# Patient Record
Sex: FEMALE | Race: WHITE | Employment: FULL TIME | URBAN - METROPOLITAN AREA
[De-identification: names, ages, dates, MRNs, and addresses within clinical notes are randomized per-mention and may not be internally consistent; named-entity substitution may affect disease eponyms.]

---

## 2019-01-02 ENCOUNTER — HOSPITAL ENCOUNTER (OUTPATIENT)
Dept: MRI IMAGING | Age: 49
Discharge: HOME | End: 2019-01-04
Payer: COMMERCIAL

## 2019-01-02 DIAGNOSIS — M50.10 CERVICAL DISC DISORDER WITH RADICULOPATHY: ICD-10-CM

## 2019-01-02 PROCEDURE — 72141 MRI NECK SPINE W/O DYE: CPT

## 2021-05-17 ENCOUNTER — HOSPITAL ENCOUNTER (OUTPATIENT)
Age: 51
Discharge: HOME | End: 2021-05-19

## 2021-05-17 PROCEDURE — 88360 TUMOR IMMUNOHISTOCHEM/MANUAL: CPT

## 2021-05-17 PROCEDURE — 88305 TISSUE EXAM BY PATHOLOGIST: CPT

## 2021-05-24 DIAGNOSIS — C50.911 INVASIVE DUCTAL CARCINOMA OF RIGHT BREAST (HCC): Primary | ICD-10-CM

## 2021-05-28 ENCOUNTER — TELEPHONE (OUTPATIENT)
Dept: SURGERY | Age: 51
End: 2021-05-28

## 2021-05-28 ENCOUNTER — TELEPHONE (OUTPATIENT)
Dept: BREAST CENTER | Age: 51
End: 2021-05-28

## 2021-05-28 NOTE — TELEPHONE ENCOUNTER
MA received a call from pt, pt has a conflicting oncology appointment on 6/4/21 and wishes to move appointment with Dr. Dianna Mccabe, pt can be reached at [phone]. MA routing to [employer] for advisement, imaging prior to appt.   Electronically signed by Juan Molina on 5/28/21 at 11:37 AM EDT

## 2021-05-28 NOTE — TELEPHONE ENCOUNTER
Patient called wishing to change the time of her appointment. She is seeing Dr Fuad Navarro at 1000. Patient will arrive here at 0730 for CXR followed by appointment at 0800. Patient states she will bring her genetic testing results (negative) to be scanned into chart. Patient states this is her second bout of breast cancer.

## 2021-06-04 ENCOUNTER — TELEPHONE (OUTPATIENT)
Dept: BREAST CENTER | Age: 51
End: 2021-06-04

## 2021-06-04 ENCOUNTER — HOSPITAL ENCOUNTER (OUTPATIENT)
Dept: GENERAL RADIOLOGY | Age: 51
Discharge: HOME | End: 2021-06-06
Payer: COMMERCIAL

## 2021-06-04 ENCOUNTER — TELEPHONE (OUTPATIENT)
Dept: CASE MANAGEMENT | Age: 51
End: 2021-06-04

## 2021-06-04 ENCOUNTER — OFFICE VISIT (OUTPATIENT)
Dept: BREAST CENTER | Age: 51
End: 2021-06-04
Payer: COMMERCIAL

## 2021-06-04 VITALS
TEMPERATURE: 99.5 F | DIASTOLIC BLOOD PRESSURE: 74 MMHG | HEIGHT: 58 IN | SYSTOLIC BLOOD PRESSURE: 116 MMHG | RESPIRATION RATE: 12 BRPM | HEART RATE: 90 BPM | OXYGEN SATURATION: 99 % | BODY MASS INDEX: 31.49 KG/M2 | WEIGHT: 150 LBS

## 2021-06-04 DIAGNOSIS — C50.911 INVASIVE DUCTAL CARCINOMA OF RIGHT BREAST (HCC): ICD-10-CM

## 2021-06-04 DIAGNOSIS — C50.911 RECURRENT BREAST CANCER, RIGHT (HCC): ICD-10-CM

## 2021-06-04 DIAGNOSIS — C50.111 MALIGNANT NEOPLASM OF CENTRAL PORTION OF RIGHT BREAST IN FEMALE, ESTROGEN RECEPTOR NEGATIVE (HCC): Primary | ICD-10-CM

## 2021-06-04 DIAGNOSIS — Z17.1 MALIGNANT NEOPLASM OF CENTRAL PORTION OF RIGHT BREAST IN FEMALE, ESTROGEN RECEPTOR NEGATIVE (HCC): Primary | ICD-10-CM

## 2021-06-04 LAB
ALBUMIN SERPL-MCNC: 4.8 G/DL (ref 3.5–5.2)
ALP BLD-CCNC: 99 U/L (ref 35–104)
ALT SERPL-CCNC: 35 U/L (ref 0–32)
ANION GAP SERPL CALCULATED.3IONS-SCNC: 15 MMOL/L (ref 7–16)
AST SERPL-CCNC: 32 U/L (ref 0–31)
BILIRUB SERPL-MCNC: <0.2 MG/DL (ref 0–1.2)
BUN BLDV-MCNC: 10 MG/DL (ref 6–20)
CALCIUM SERPL-MCNC: 9.9 MG/DL (ref 8.6–10.2)
CHLORIDE BLD-SCNC: 101 MMOL/L (ref 98–107)
CO2: 23 MMOL/L (ref 22–29)
CREAT SERPL-MCNC: 0.6 MG/DL (ref 0.5–1)
GFR AFRICAN AMERICAN: >60
GFR NON-AFRICAN AMERICAN: >60 ML/MIN/1.73
GLUCOSE BLD-MCNC: 96 MG/DL (ref 74–99)
HCT VFR BLD CALC: 43.5 % (ref 34–48)
HEMOGLOBIN: 15.1 G/DL (ref 11.5–15.5)
MCH RBC QN AUTO: 34.3 PG (ref 26–35)
MCHC RBC AUTO-ENTMCNC: 34.7 % (ref 32–34.5)
MCV RBC AUTO: 98.9 FL (ref 80–99.9)
PDW BLD-RTO: 12 FL (ref 11.5–15)
PLATELET # BLD: 211 E9/L (ref 130–450)
PMV BLD AUTO: 9.1 FL (ref 7–12)
POTASSIUM SERPL-SCNC: 4 MMOL/L (ref 3.5–5)
RBC # BLD: 4.4 E12/L (ref 3.5–5.5)
SODIUM BLD-SCNC: 139 MMOL/L (ref 132–146)
TOTAL PROTEIN: 7.8 G/DL (ref 6.4–8.3)
WBC # BLD: 7.7 E9/L (ref 4.5–11.5)

## 2021-06-04 PROCEDURE — 71046 X-RAY EXAM CHEST 2 VIEWS: CPT

## 2021-06-04 PROCEDURE — 99245 OFF/OP CONSLTJ NEW/EST HI 55: CPT | Performed by: SURGERY

## 2021-06-04 PROCEDURE — 99203 OFFICE O/P NEW LOW 30 MIN: CPT | Performed by: SURGERY

## 2021-06-04 ASSESSMENT — ENCOUNTER SYMPTOMS
GASTROINTESTINAL NEGATIVE: 1
VOMITING: 0
ABDOMINAL PAIN: 0
BLOOD IN STOOL: 0
BACK PAIN: 0
DIARRHEA: 0
ANAL BLEEDING: 0
COUGH: 0
RESPIRATORY NEGATIVE: 1
ALLERGIC/IMMUNOLOGIC NEGATIVE: 1
CONSTIPATION: 0
NAUSEA: 0
EYES NEGATIVE: 1
SHORTNESS OF BREATH: 0
ABDOMINAL DISTENTION: 0

## 2021-06-04 NOTE — TELEPHONE ENCOUNTER
Met with patient regarding her recent breast cancer diagnosis at surgical consultation appointment with Dr. Cierra Cardenas. Instructed on her breast biopsy pathology findings including cancer type and hormone receptor status. Hard copy given. Instructed on next steps including breast surgery options as per Dr. Mikayla Little recommendations. Provided with extensive literature including \"Be A Survivor: Your guide to breast cancer treatment\". ,chapter 4 reviewed, Your Guide to Your Breast Cancer Pathology Report, What does lymphedema feel like, local counseling agencies and local support groups. Today patient received copies of their pathology report as well as a list of local medical oncology providers. Provided patient with my contact information and encouraged to call me with questions or concerns. Patient verbalizes understanding and appreciative of nurse navigator visit.

## 2021-06-04 NOTE — TELEPHONE ENCOUNTER
Upon educating the patient, she meets criteria for testing of BRCA related mutations implicated in breast and ovarian cancer. This is by virtue of her having a diagnosis of breast cancer combined with either one of the following criteria as described by NCCN guidelines:  Personal history of breast cancer at the age of 30. Now has breast cancer age 52 on the same side. Mom had lung cancer age 62          Pre-Test Education and Risk Assessment During the patient's first visit, the patient has completed the \"Family History Questionnaire\" along with personal information pertinent to assessing risk factors. This information is used to complete the genetic assessment. Informed Consent Procedures Education along with the information guide \"Hereditary Breast and Ovarian Cancer Syndrome, A Patient's Guide to risk assessment\" is provided to the patient with additional resources listed with the information guide. Informed consent is obtained for all genetic testing, and the limitations and benefits of testing are discussed. The specific type of test to be completed, the cost of the tests, possible test results, and the implications of these results are reviewed with the individual. Written consent is obtained prior to testing. Testing  Confidentiality Standards Privacy is maintained in accordance with institutional guidelines. No patient files are coded. Information obtained is kept in a secure medical record. Any information regarding genetic testing cannot be released without the written consent of the individual.   Testing Genetic testing is coordinated and sent to in-house and outside institutions that are CAP/CLIA approved. Available Testing  Cancer/Syndrome Gene  Breast & Ovarian Cancer BRCA1, BRCA2       Blood specimen obtained with today's visit. Educational brochure given to patient to take home.     After considering the risks, benefits, and limitations, the patient chose to pursue and provided informed consent for the following testing:   Integrated BRACAnalysis with 5by Hereditary Cancer Update Test.    Genetic assessment and education in collaboration with Renata Cano MD FACS

## 2021-06-04 NOTE — PROGRESS NOTES
[facility]  HISTORY & PHYSICAL  ATTENDING NOTE    Patient's Name/Date of Birth: Marjan Aver 1970    Date: 2021     Chief Complaint   Patient presents with   [facility] Consultation     NEW BREAST CANCER:  Right breast - Invasive ductal carcinoma ER(-) IA(-) Her2(+) -- imaging/biopsy [facility] - Referring Dr Olga Alcantar right sided, AGE 30, BRCA NEGATIVE- WILL BRING- SEEING  [address] [place] [address] AT 1000    Immunizations     Pt has recieved both COVID vaccines        Goshen Meaghan Rae presents for evaluation of a mammographic abnormality. PCP: Benjy Joseph MD. Gynecologist: none. Referred by:  Dr. John Alston    The mammogram was performed at [facility] on May 11, 2021. The patient has noted a change in BSE since presentation. Patient denies nipple discharge. Patient admits to a personal history of breast cancer. Breast cancer risk factors include previous breast CA and gender. Ashkenazi Protestant Ancestry: No.    Felt a dimple about a month ago. Celio Smith has a history of right breast cancer at age 30. She had genetic testing at that time, but it appears they only tested for BRCA and no other genes. She had an ER/IA+ HER2- breast cancer at that time. Her Oncotype Dx was 2. She was treated with lumpectomy with SLNBx. Actually had an excisional biopsy first by Dr. Rosy Pelaez at [facility] which removed the tumor, then went to [facility] and Dr. Marcellina Goldmann did a reexcision with margins and scar removal and SLNBx (x2) and left breast biopsy. She was then on tamoxifen x 5 years. She had radiation. OBSTETRIC RELATED HISTORY:  Age of menarche was 17. Age of menopause was 42. Patient denies hormonal therapy. Patient is . Age of first live birth was 23. Patient did breast feed. Is patient interested in fertility information about fertility preservation?  No    CANCER SURVEILLANCE HISTORY:  Mammograms: Yes been a few years since last  Breast MRI's: Yes at time of breast cancer 2008  Breast Biopsies: Yes   Colonoscopy: No   GI Polyps: Not Applicable   EGD: Yes   Pelvic Exam: Yes at time of oophorectomy  Pap Smear: No   Dermatology: No   Lung screening: no  H/O DVT:  no  H/O Radiation:  yes - 2008-- 8 weeks, 40 visit        Estimated body mass index is 31.35 kg/m² as calculated from the following:    Height as of this encounter: 4' 10\" (1.473 m). Weight as of this encounter: 150 lb (68 kg). Bra Size: 36B    Because violence is so common, we ask all our patients: are you in a relationship or do you live with a person who threatens, hurts, or controls you:  no    Patient drinks little caffeinated beverages. Patient does smoke cigarettes. Patient does not use recreational drugs. Past Medical History:   Diagnosis Date    Cancer [facility]) 2008    right sided        Past Surgical History:   Procedure Laterality Date    BREAST LUMPECTOMY Right     CARPAL TUNNEL RELEASE Right     PARTIAL HYSTERECTOMY      TONSILLECTOMY AND ADENOIDECTOMY         No current outpatient medications on file. No current facility-administered medications for this visit.        Family History   Problem Relation Age of Onset   [place] Rowland Cancer Mother         lung     Ashkenazi Mosque Ancestry: No    Allergies   Allergen Reactions    Meperidine     Morphine     Sulfa Antibiotics Nausea And Vomiting and Other (See Comments)     fever       Social History     Socioeconomic History    Marital status:      Spouse name: Not on file    Number of children: Not on file    Years of education: Not on file    Highest education level: Not on file   Occupational History    Not on file   Tobacco Use    Smoking status: Current Every Day Smoker    Smokeless tobacco: Never Used   Vaping Use    Vaping Use: Never used   Substance and Sexual Activity    Alcohol use: Yes     Comment: occasionaly    Drug use: Never    Sexual activity: Not on file   Other Topics Concern    Not on file   Social History Narrative    Not on file     Social Determinants of Health     Financial Resource Strain:     Difficulty of Paying Living Expenses:    Food Insecurity:     Worried About Running Out of Food in the Last Year:     [address] in the Last Year:    Transportation Needs:     Lack of Transportation (Medical):  Lack of Transportation (Non-Medical):    Physical Activity:     Days of Exercise per Week:     Minutes of Exercise per Session:    Stress:     Feeling of Stress :    Social Connections:     Frequency of Communication with Friends and Family:     Frequency of Social Gatherings with Friends and Family:     Attends Lutheran Services:     Active Member of Clubs or Organizations:     Attends Club or Organization Meetings:     Marital Status:    Intimate Partner Violence:     Fear of Current or Ex-Partner:     Emotionally Abused:     Physically Abused:     Sexually Abused:        Occupation:     Review of Systems   Constitutional: Negative. Negative for activity change, appetite change and unexpected weight change. HENT: Negative. Eyes: Negative. Respiratory: Negative. Negative for cough and shortness of breath. Cardiovascular: Negative. Negative for chest pain and leg swelling. Gastrointestinal: Negative. Negative for abdominal distention, abdominal pain, anal bleeding, blood in stool, constipation, diarrhea, nausea and vomiting. Endocrine: Negative. Genitourinary: Negative. Musculoskeletal: Negative. Negative for arthralgias, back pain, gait problem, joint swelling and myalgias. Skin: Negative. Allergic/Immunologic: Negative. Neurological: Negative. Negative for dizziness, weakness and headaches. Hematological: Negative. Psychiatric/Behavioral: Negative. Negative for confusion, decreased concentration and sleep disturbance. ECOG PS:  0  Covid vaccination? Yes Pfizer  Flu Vaccination?  No    /74 (Site: Left Upper Arm, Position: Sitting, Cuff Size: Medium Adult)   Pulse 90   Temp 99.5 °F (37.5 °C) (Temporal)   Resp 12   Ht 4' 10\" (1.473 m)   Wt 150 lb (68 kg)   SpO2 99%   BMI 31.35 kg/m²   Physical Exam  Constitutional:       Appearance: Normal appearance. HENT:      Head: Normocephalic and atraumatic. Nose: Nose normal.      Mouth/Throat:      Mouth: Mucous membranes are moist.      Pharynx: Oropharynx is clear. Eyes:      Extraocular Movements: Extraocular movements intact. Pupils: Pupils are equal, round, and reactive to light. Cardiovascular:      Rate and Rhythm: Normal rate and regular rhythm. Pulses: Normal pulses. Heart sounds: Normal heart sounds. Pulmonary:      Effort: Pulmonary effort is normal.      Breath sounds: Normal breath sounds. Chest:      Breasts:         Right: No swelling, bleeding, inverted nipple, mass, nipple discharge, skin change or tenderness. Abdominal:      General: There is no distension. Palpations: Abdomen is soft. Tenderness: There is no abdominal tenderness. Musculoskeletal:         General: No tenderness or signs of injury. Cervical back: Normal range of motion and neck supple. Lymphadenopathy:      Upper Body:      Right upper body: No supraclavicular or axillary adenopathy. Left upper body: No supraclavicular or axillary adenopathy. Skin:     General: Skin is warm and dry. Neurological:      General: No focal deficit present. Mental Status: She is alert and oriented to person, place, and time. Psychiatric:         Mood and Affect: Mood normal.         Behavior: Behavior normal.         Thought Content: Thought content normal.         Judgment: Judgment normal.         MAMMOGRAM:        ULTRASOUND:      PATHOLOGY:    Diagnosis:   Right breast, \"retro-\", core biopsy: Invasive carcinoma, no special type   (ductal), see comment.      Comment:   Procedure- core biopsy   Specimen Laterality-right   Tumor Site-retro-   Histologic Type-invasive carcinoma, no special type (ductal)   Histologic Grade ([place] Histologic Score):    Glandular differentiation- score 3    Nuclear pleomorphism- score 3    Mitotic Rate- score 1                      Score-7    Overall Grade-2   DCIS-not identified   Lymphovascular invasion-not identified   Additional Note: The previous ipsilateral invasive and intraductal   carcinoma is noted ([id number]).  Intradepartmental consultation is   obtained. Breast Cancer Marker Studies:     Estrogen Receptors (ER):   -Negative (less than 1%): Internal control cells present and stain as expected: yes     Progesterone Receptors (CO):   -Negative (less than 1%): Internal control cells present and stain as expected: yes     Hormone receptor studies are performed by immunohistochemistry on   formalin-fixed, paraffin-embedded tissue (Roche Benchmark Immunostainer,   [place] anti-ER clone SP1, anti-CO clone [id number], polymer-based detection   chemistry). ER and CO are evaluated based on the percentage of cells   showing nuclear staining with >1% considered positive for each. Her-2/faustina (c-erb B-2) protein expression: Positive, 3+     ASSESSMENT/PLAN:  Right breast cancer--clinical prognostic Stage IA, ER/CO - HER2+  --MRI breast due to breast density and HER2+ cancer needing to assess size for NAC vs. Adjuvant therapy. --CT C/A/P, bone scan for recurrent breast cancer  --CBC, CMP, genetic testing  --Patient would like to have a bilateral mastectomy with SLNBx. I went over the r/b/a to procedure. I discussed bilateral pec blocks for the procedure. We discussed use of drains.   We discussed possibility of ALND given the prior SLNBx, however will definitely try SLNBx first.      I spent 85 minutes personally with the patient/family/staff/resident(s) in examination, chart and imaging review, discussing natural history and prognosis, differential diagnosis, risks and benefits of treatment and instructions of which more than 50% of the time was spent for counseling and coordinating care.       Guerline Urban MD, MSc, FACS  6/4/2021  8:22 AM

## 2021-06-10 ENCOUNTER — TELEPHONE (OUTPATIENT)
Dept: BREAST CENTER | Age: 51
End: 2021-06-10

## 2021-06-10 NOTE — TELEPHONE ENCOUNTER
LEANN Alaniz called and spoke to Sheree Cowart and scheduled PT for NM Whole Body and CT C/A/P on 06/23/2021 @ [facility]. NM injection @ 8am CT C/A/P @ 9am & 9:30am, and NM scan @ 11am PT verbalized she understood prep instructions, and NPO after midnight. PT verbalized that she understood appointment date/time, as well as to arrive 15 minutes prior to procedure. PT advised on where to park and enter the hospital at for procedure. MA instructed PT to call the office at [phone] with any questions, comments, or concerns about the procedure. MA faxed bilateral Breast MRI order to [facility] for them to contact patient and schedule. MA advised once they contact her for pt to call office to see if im able to have Dr Cierra Cardenas come to [facility] after MRI and perform skin bx on spot on back. Pt verbalized understanding.        Electronically signed by Cherry Cooper MA on 6/10/21 at 9:37 AM EDT

## 2021-06-10 NOTE — TELEPHONE ENCOUNTER
LEANN Donald contacted [facility] for prior authorization of outpatient imaging. No prior authorization needed for  CT C/A/P, Breast MRI, NM whole body [address]  in [place]. MA used automated system to check if authorization was required. Reference number [id number]. MA scanned authorization form in media tab.     Electronically signed by Rehana Kruse MA on 6/10/21 at 9:25 AM EDT

## 2021-06-15 ENCOUNTER — HOSPITAL ENCOUNTER (OUTPATIENT)
Dept: MRI IMAGING | Age: 51
Discharge: HOME | End: 2021-06-17
Payer: COMMERCIAL

## 2021-06-15 DIAGNOSIS — Z17.1 MALIGNANT NEOPLASM OF CENTRAL PORTION OF RIGHT BREAST IN FEMALE, ESTROGEN RECEPTOR NEGATIVE (HCC): ICD-10-CM

## 2021-06-15 DIAGNOSIS — C50.111 MALIGNANT NEOPLASM OF CENTRAL PORTION OF RIGHT BREAST IN FEMALE, ESTROGEN RECEPTOR NEGATIVE (HCC): ICD-10-CM

## 2021-06-15 DIAGNOSIS — C50.911 RECURRENT BREAST CANCER, RIGHT (HCC): ICD-10-CM

## 2021-06-15 PROCEDURE — 6360000004 HC RX CONTRAST MEDICATION: Performed by: RADIOLOGY

## 2021-06-15 PROCEDURE — A9585 GADOBUTROL INJECTION: HCPCS | Performed by: RADIOLOGY

## 2021-06-15 PROCEDURE — 77049 MRI BREAST C-+ W/CAD BI: CPT

## 2021-06-15 RX ADMIN — GADOBUTROL 7 ML: 604.72 INJECTION INTRAVENOUS at 08:52

## 2021-06-21 ENCOUNTER — TELEPHONE (OUTPATIENT)
Dept: BREAST CENTER | Age: 51
End: 2021-06-21

## 2021-06-21 DIAGNOSIS — Z17.1 MALIGNANT NEOPLASM OF CENTRAL PORTION OF RIGHT BREAST IN FEMALE, ESTROGEN RECEPTOR NEGATIVE (HCC): Primary | ICD-10-CM

## 2021-06-21 DIAGNOSIS — C50.911 RECURRENT BREAST CANCER, RIGHT (HCC): ICD-10-CM

## 2021-06-21 DIAGNOSIS — C50.111 MALIGNANT NEOPLASM OF CENTRAL PORTION OF RIGHT BREAST IN FEMALE, ESTROGEN RECEPTOR NEGATIVE (HCC): Primary | ICD-10-CM

## 2021-06-21 NOTE — TELEPHONE ENCOUNTER
Referral placed. Will call patient after her CT scans on 6/23.   I will call her later this week with all her results

## 2021-06-21 NOTE — TELEPHONE ENCOUNTER
Patient notified that referral has been placed to plastic surgery. She states she also has an appointment with Dr. Natasha Joe, medical oncology, 6/25/21 (this Friday).

## 2021-06-21 NOTE — TELEPHONE ENCOUNTER
Patient asking to be referred to Dr. Ankush Varner to discuss reconstruction options before making a final decision for surgery. Will check with Dr. Banda Courser on placing a referral.    I spoke with Jonna Page in reference to her [facility] genetic analysis. The results are negative. No clinically significant mutation identified. I explained the meaning of the results and informed her that I will notify Dr. Jonathan Acuña and a copy will go to Medical and Radiation Oncologist when and if appropriate. I will mail her copy along with the [facility] Understanding your results booklet.

## 2021-06-23 ENCOUNTER — HOSPITAL ENCOUNTER (OUTPATIENT)
Dept: CT IMAGING | Age: 51
Discharge: HOME | End: 2021-06-25
Payer: COMMERCIAL

## 2021-06-23 ENCOUNTER — HOSPITAL ENCOUNTER (OUTPATIENT)
Dept: NUCLEAR MEDICINE | Age: 51
Discharge: HOME | End: 2021-06-25
Payer: COMMERCIAL

## 2021-06-23 ENCOUNTER — TELEPHONE (OUTPATIENT)
Dept: SURGERY | Age: 51
End: 2021-06-23

## 2021-06-23 DIAGNOSIS — Z17.1 MALIGNANT NEOPLASM OF CENTRAL PORTION OF RIGHT BREAST IN FEMALE, ESTROGEN RECEPTOR NEGATIVE (HCC): ICD-10-CM

## 2021-06-23 DIAGNOSIS — C50.911 RECURRENT BREAST CANCER, RIGHT (HCC): ICD-10-CM

## 2021-06-23 DIAGNOSIS — C50.111 MALIGNANT NEOPLASM OF CENTRAL PORTION OF RIGHT BREAST IN FEMALE, ESTROGEN RECEPTOR NEGATIVE (HCC): ICD-10-CM

## 2021-06-23 PROCEDURE — A9503 TC99M MEDRONATE: HCPCS | Performed by: RADIOLOGY

## 2021-06-23 PROCEDURE — 78306 BONE IMAGING WHOLE BODY: CPT | Performed by: RADIOLOGY

## 2021-06-23 PROCEDURE — 74177 CT ABD & PELVIS W/CONTRAST: CPT

## 2021-06-23 PROCEDURE — 71260 CT THORAX DX C+: CPT

## 2021-06-23 PROCEDURE — 78306 BONE IMAGING WHOLE BODY: CPT

## 2021-06-23 PROCEDURE — 3430000000 HC RX DIAGNOSTIC RADIOPHARMACEUTICAL: Performed by: RADIOLOGY

## 2021-06-23 PROCEDURE — 6360000004 HC RX CONTRAST MEDICATION: Performed by: RADIOLOGY

## 2021-06-23 RX ORDER — SODIUM CHLORIDE 0.9 % (FLUSH) 0.9 %
10 SYRINGE (ML) INJECTION
Status: ACTIVE | OUTPATIENT
Start: 2021-06-23 | End: 2021-06-23

## 2021-06-23 RX ORDER — TC 99M MEDRONATE 20 MG/10ML
25 INJECTION, POWDER, LYOPHILIZED, FOR SOLUTION INTRAVENOUS
Status: COMPLETED | OUTPATIENT
Start: 2021-06-23 | End: 2021-06-23

## 2021-06-23 RX ADMIN — TC 99M MEDRONATE 26.2 MILLICURIE: 20 INJECTION, POWDER, LYOPHILIZED, FOR SOLUTION INTRAVENOUS at 08:23

## 2021-06-23 RX ADMIN — IOHEXOL 50 ML: 240 INJECTION, SOLUTION INTRATHECAL; INTRAVASCULAR; INTRAVENOUS; ORAL at 09:25

## 2021-06-23 NOTE — TELEPHONE ENCOUNTER
I called patient and went over her MRI, CT scans and bone scan. She has an appointment with Dr. Mckenna Gutierrez next week. She thinks she still wants to go for lab which wants to get all the information possible I explained her that is perfectly fine she should get all the information she can and she is going to several online groups to discuss what is the best option for her. I reviewed my note and saw that she is a current everyday smoker she says she smokes a little bit. I informed her that she needs to quit smoking now she will have to be smoke-free and tobacco free for at least 3 weeks prior to any surgery or even possibly scheduling surgery and she will be tested to make sure she is smoke-free. She says she will quit now she is quit before and she can do this. I advised her to call my office after she has made her final decision on reconstruction or not.

## 2021-06-28 NOTE — TELEPHONE ENCOUNTER
MA checked patient chart to see when was scheduled with  and patient canceled consult appt.      Electronically signed by Sincere Cedeno MA on 6/28/21 at 8:03 AM EDT

## 2021-07-01 ENCOUNTER — PREP FOR PROCEDURE (OUTPATIENT)
Dept: SURGERY | Age: 51
End: 2021-07-01

## 2021-07-01 ENCOUNTER — TELEPHONE (OUTPATIENT)
Dept: BREAST CENTER | Age: 51
End: 2021-07-01

## 2021-07-01 ENCOUNTER — TELEPHONE (OUTPATIENT)
Dept: SURGERY | Age: 51
End: 2021-07-01

## 2021-07-01 DIAGNOSIS — C50.911 INVASIVE DUCTAL CARCINOMA OF RIGHT BREAST (HCC): Primary | ICD-10-CM

## 2021-07-01 DIAGNOSIS — C50.911 RECURRENT BREAST CANCER, RIGHT (HCC): Primary | ICD-10-CM

## 2021-07-01 RX ORDER — SODIUM CHLORIDE 9 MG/ML
INJECTION, SOLUTION INTRAVENOUS CONTINUOUS
Status: CANCELLED | OUTPATIENT
Start: 2021-07-01

## 2021-07-01 RX ORDER — SODIUM CHLORIDE 0.9 % (FLUSH) 0.9 %
10 SYRINGE (ML) INJECTION EVERY 12 HOURS SCHEDULED
Status: CANCELLED | OUTPATIENT
Start: 2021-07-01

## 2021-07-01 RX ORDER — SODIUM CHLORIDE 9 MG/ML
25 INJECTION, SOLUTION INTRAVENOUS PRN
Status: CANCELLED | OUTPATIENT
Start: 2021-07-01

## 2021-07-01 RX ORDER — SODIUM CHLORIDE 0.9 % (FLUSH) 0.9 %
10 SYRINGE (ML) INJECTION PRN
Status: CANCELLED | OUTPATIENT
Start: 2021-07-01

## 2021-07-01 NOTE — TELEPHONE ENCOUNTER
MA Hoadn Mon called and spoke to [facility] and scheduled PT for right simple mastectomy and left prohylactic mastectomy, left side medi-port placement and back lesion bx on 07/6/2021 @ 1pm with Dr. Alexandra Motley. PT is not taking ASA/blood thinner products. PT has received both COVID 19 vaccines. PT verbalized she understood prep instructions, and NPO after midnight. PT verbalized that she understood appointment date/time, as well as to arrive 1.5 hours prior to procedure. PT advised PAT will call to go over all medication and advise on where to park and enter the hospital at for procedure. PT has been told to make sure they have a ride to and from procedure as they are not allowed to drive after procedure. MA instructed PT to call the office at [phone] with any questions, comments, or concerns about the procedure. MA scheduled post op appointment for 07/30/2021 @ 9am in [facility] with .        Electronically signed by Ridge Alcocer MA on 7/1/21 at 2:44 PM EDT

## 2021-07-01 NOTE — H&P (VIEW-ONLY)
[facility]  HISTORY & PHYSICAL  ATTENDING NOTE     Patient's Name/Date of Birth: Peggy Alcantara / 1970     Date: 2021           Chief Complaint   Patient presents with    Consultation       NEW BREAST CANCER:  Right breast - Invasive ductal carcinoma ER(-) CO(-) Her2(+) -- imaging/biopsy [facility] - Referring Dr Ivy Farooq right sided, AGE 30, BRCA NEGATIVE- WILL BRING- SEEING DR LINDQUIST AT 1000    Immunizations       Pt has recieved both COVID vaccines         Isaiah Roberto Carlos Rae presents for evaluation of a mammographic abnormality.     PCP: Xochilt Cazares MD. Gynecologist: none.     Referred by:  Dr. Kleber Ryan     The mammogram was performed at [facility] on May 11, 2021. The patient has noted a change in BSE since presentation. Patient denies nipple discharge. Patient admits to a personal history of breast cancer. Breast cancer risk factors include previous breast CA and gender. Ashkenazi Moravian Ancestry: No.     Felt a dimple about a month ago. Solange Urrutia has a history of right breast cancer at age 30. She had genetic testing at that time, but it appears they only tested for BRCA and no other genes. She had an ER/CO+ HER2- breast cancer at that time. Her Oncotype Dx was 2. She was treated with lumpectomy with SLNBx. Actually had an excisional biopsy first by Dr. Cristiane Bansal at [facility] which removed the tumor, then went to [facility] and Dr. Yulissa Mcdonald did a reexcision with margins and scar removal and SLNBx (x2) and left breast biopsy. She was then on tamoxifen x 5 years. She had radiation.       OBSTETRIC RELATED HISTORY:  Age of menarche was 17. Age of menopause was 42. Patient denies hormonal therapy. Patient is . Age of first live birth was 23. Patient did breast feed. Is patient interested in fertility information about fertility preservation?  No     CANCER SURVEILLANCE HISTORY:  Mammograms: Yes been a few years since last  Breast MRI's: Yes at time of breast cancer 2008  Breast Biopsies: Yes   Colonoscopy: No   GI Polyps: Not Applicable   EGD: Yes   Pelvic Exam: Yes at time of oophorectomy  Pap Smear: No   Dermatology: No   Lung screening: no  H/O DVT:  no  H/O Radiation:  yes - 2008-- 8 weeks, 40 visit           Estimated body mass index is 31.35 kg/m² as calculated from the following:    Height as of this encounter: 4' 10\" (1.473 m). Weight as of this encounter: 150 lb (68 kg). Bra Size: 36B     Because violence is so common, we ask all our patients: are you in a relationship or do you live with a person who threatens, hurts, or controls you:  no     Patient drinks little caffeinated beverages. Patient does smoke cigarettes.  Patient does not use recreational drugs.        Past Medical History        Past Medical History:   Diagnosis Date    Cancer ([facility] [address] 2008     right sided             Past Surgical History         Past Surgical History:   Procedure Laterality Date    BREAST LUMPECTOMY Right      CARPAL TUNNEL RELEASE Right      PARTIAL HYSTERECTOMY        TONSILLECTOMY AND ADENOIDECTOMY                Current Facility-Administered Medications   No current outpatient medications on file.      No current facility-administered medications for this visit.            Family History         Family History   Problem Relation Age of Onset    Cancer Mother           lung         Ashkenazi Taoist Ancestry: No           Allergies   Allergen Reactions    Meperidine      Morphine      Sulfa Antibiotics Nausea And Vomiting and Other (See Comments)       fever         Social History               Socioeconomic History    Marital status:        Spouse name: Not on file    Number of children: Not on file    Years of education: Not on file    Highest education level: Not on file   Occupational History    Not on file   Tobacco Use    Smoking status: Current Every Day Smoker    Smokeless tobacco: Never Used   Vaping Use    Vaping Use: Never used   Substance and Sexual Activity    Alcohol use: Yes       Comment: occasionaly    Drug use: Never    Sexual activity: Not on file   Other Topics Concern    Not on file   Social History Narrative    Not on file      Social Determinants of Health          Financial Resource Strain:     Difficulty of Paying Living Expenses:    Food Insecurity:     Worried About Running Out of Food in the Last Year:     [address] in the Last Year:    Transportation Needs:     Lack of Transportation (Medical):  Lack of Transportation (Non-Medical):    Physical Activity:     Days of Exercise per Week:     Minutes of Exercise per Session:    Stress:     Feeling of Stress :    Social Connections:     Frequency of Communication with Friends and Family:     Frequency of Social Gatherings with Friends and Family:     Attends Hoahaoism Services:     Active Member of Clubs or Organizations:     Attends Club or Organization Meetings:     Marital Status:    Intimate Partner Violence:     Fear of Current or Ex-Partner:     Emotionally Abused:     Physically Abused:     Sexually Abused:             Occupation:      Review of Systems   Constitutional: Negative. Negative for activity change, appetite change and unexpected weight change. HENT: Negative. Eyes: Negative. Respiratory: Negative. Negative for cough and shortness of breath. Cardiovascular: Negative. Negative for chest pain and leg swelling. Gastrointestinal: Negative. Negative for abdominal distention, abdominal pain, anal bleeding, blood in stool, constipation, diarrhea, nausea and vomiting. Endocrine: Negative. Genitourinary: Negative. Musculoskeletal: Negative. Negative for arthralgias, back pain, gait problem, joint swelling and myalgias. Skin: Negative. Allergic/Immunologic: Negative. Neurological: Negative. Negative for dizziness, weakness and headaches. Hematological: Negative. Psychiatric/Behavioral: Negative. Negative for confusion, decreased concentration and sleep disturbance.         ECOG PS:  0  Covid vaccination? Yes Pfizer  Flu Vaccination? No     /74 (Site: Left Upper Arm, Position: Sitting, Cuff Size: Medium Adult)   Pulse 90   Temp 99.5 °F (37.5 °C) (Temporal)   Resp 12   Ht 4' 10\" (1.473 m)   Wt 150 lb (68 kg)   SpO2 99%   BMI 31.35 kg/m²   Physical Exam  Constitutional:       Appearance: Normal appearance. HENT:      Head: Normocephalic and atraumatic. Nose: Nose normal.      Mouth/Throat:      Mouth: Mucous membranes are moist.      Pharynx: Oropharynx is clear. Eyes:      Extraocular Movements: Extraocular movements intact. Pupils: Pupils are equal, round, and reactive to light. Cardiovascular:      Rate and Rhythm: Normal rate and regular rhythm. Pulses: Normal pulses. Heart sounds: Normal heart sounds. Pulmonary:      Effort: Pulmonary effort is normal.      Breath sounds: Normal breath sounds. Chest:      Breasts:         Right: No swelling, bleeding, inverted nipple, mass, nipple discharge, skin change or tenderness. Abdominal:      General: There is no distension. Palpations: Abdomen is soft. Tenderness: There is no abdominal tenderness. Musculoskeletal:         General: No tenderness or signs of injury. Cervical back: Normal range of motion and neck supple. Lymphadenopathy:      Upper Body:      Right upper body: No supraclavicular or axillary adenopathy. Left upper body: No supraclavicular or axillary adenopathy. Skin:     General: Skin is warm and dry. Neurological:      General: No focal deficit present. Mental Status: She is alert and oriented to person, place, and time. Psychiatric:         Mood and Affect: Mood normal.         Behavior: Behavior normal.         Thought Content:  Thought content normal.         Judgment: Judgment normal.          MAMMOGRAM:          ULTRASOUND:       PATHOLOGY:    Diagnosis:   Right breast, \"retro-\", core biopsy: Invasive carcinoma, no special type   (ductal), see comment. Comment:   Procedure- core biopsy   Specimen Laterality-right   Tumor Site-retro-   Histologic Type-invasive carcinoma, no special type (ductal)   Histologic Grade (Beatriz Histologic Score):    Glandular differentiation- score 3    Nuclear pleomorphism- score 3    Mitotic Rate- score 1                      Score-7    Overall Grade-2   DCIS-not identified   Lymphovascular invasion-not identified   Additional Note: The previous ipsilateral invasive and intraductal   carcinoma is noted ([id number]).  Intradepartmental consultation is   obtained. Breast Cancer Marker Studies:     Estrogen Receptors (ER):   -Negative (less than 1%): Internal control cells present and stain as expected: yes     Progesterone Receptors (VA):   -Negative (less than 1%): Internal control cells present and stain as expected: yes     Hormone receptor studies are performed by immunohistochemistry on   formalin-fixed, paraffin-embedded tissue (Roche Benchmark Immunostainer,   [place] anti-ER clone SP1, anti-VA clone [id number], polymer-based detection   chemistry). ER and VA are evaluated based on the percentage of cells   showing nuclear staining with >1% considered positive for each. Her-2/faustina (c-erb B-2) protein expression: Positive, 3+      ASSESSMENT/PLAN:  Right breast cancer--clinical prognostic Stage IA, ER/VA - HER2+  --MRI breast due to breast density and HER2+ cancer needing to assess size for NAC vs. Adjuvant therapy. --reviewed, size of lesion is 1.6cm  --CT C/A/P, bone scan for recurrent breast cancer--negative  --CBC, CMP, genetic testing--reviewed  --Patient would like to have a bilateral mastectomy with SLNBx. I went over the r/b/a to procedure. I discussed bilateral pec blocks for the procedure. We discussed use of drains.   We discussed possibility of ALND given the prior SLNBx, however will definitely try SLNBx first.       I spent 85 minutes personally with the patient/family/staff/resident(s) in examination, chart and imaging review, discussing natural history and prognosis, differential diagnosis, risks and benefits of treatment and instructions of which more than 50% of the time was spent for counseling and coordinating care.  Ellie Peoples MD, MSc, FACS  6/4/2021  8:22 AM

## 2021-07-01 NOTE — H&P
[facility]  HISTORY & PHYSICAL  ATTENDING NOTE     Patient's Name/Date of Birth: Nitesh Wolf / 1970     Date: 2021           Chief Complaint   Patient presents with    Consultation       NEW BREAST CANCER:  Right breast - Invasive ductal carcinoma ER(-) MA(-) Her2(+) -- imaging/biopsy [facility] - Referring Dr Chapin Rudolph right sided, AGE 30, BRCA NEGATIVE- WILL BRING- SEEING DR LINDQUIST AT 1000    Immunizations       Pt has recieved both COVID vaccines         Ignacio Rae presents for evaluation of a mammographic abnormality.     PCP: Miesha Campbell MD. Gynecologist: none.     Referred by:  Dr. Slick Barr     The mammogram was performed at [facility] on May 11, 2021. The patient has noted a change in BSE since presentation. Patient denies nipple discharge. Patient admits to a personal history of breast cancer. Breast cancer risk factors include previous breast CA and gender. Ashkenazi Methodist Ancestry: No.     Felt a dimple about a month ago. Yusef Garcia has a history of right breast cancer at age 30. She had genetic testing at that time, but it appears they only tested for BRCA and no other genes. She had an ER/MA+ HER2- breast cancer at that time. Her Oncotype Dx was 2. She was treated with lumpectomy with SLNBx. Actually had an excisional biopsy first by Dr. Motley Chin[place] at [facility] which removed the tumor, then went to [facility] and Dr. Sharad Pham did a reexcision with margins and scar removal and SLNBx (x2) and left breast biopsy. She was then on tamoxifen x 5 years. She had radiation.       OBSTETRIC RELATED HISTORY:  Age of menarche was 17. Age of menopause was 42. Patient denies hormonal therapy. Patient is . Age of first live birth was 23. Patient did breast feed. Is patient interested in fertility information about fertility preservation?  No     CANCER SURVEILLANCE HISTORY:  Mammograms: Yes been a few years since last  Breast MRI's: Yes at time of breast cancer 2008  Breast Biopsies: Yes   Colonoscopy: No   GI Polyps: Not Applicable   EGD: Yes   Pelvic Exam: Yes at time of oophorectomy  Pap Smear: No   Dermatology: No   Lung screening: no  H/O DVT:  no  H/O Radiation:  yes - 2008-- 8 weeks, 40 visit           Estimated body mass index is 31.35 kg/m² as calculated from the following:    Height as of this encounter: 4' 10\" (1.473 m). Weight as of this encounter: 150 lb (68 kg). Bra Size: 36B     Because violence is so common, we ask all our patients: are you in a relationship or do you live with a person who threatens, hurts, or controls you:  no     Patient drinks little caffeinated beverages. Patient does smoke cigarettes.  Patient does not use recreational drugs.        Past Medical History        Past Medical History:   Diagnosis Date    Cancer ([facility] [address] 2008     right sided             Past Surgical History         Past Surgical History:   Procedure Laterality Date    BREAST LUMPECTOMY Right      CARPAL TUNNEL RELEASE Right      PARTIAL HYSTERECTOMY        TONSILLECTOMY AND ADENOIDECTOMY                Current Facility-Administered Medications   No current outpatient medications on file.      No current facility-administered medications for this visit.            Family History         Family History   Problem Relation Age of Onset    Cancer Mother           lung         Ashkenazi Mandaen Ancestry: No           Allergies   Allergen Reactions    Meperidine      Morphine      Sulfa Antibiotics Nausea And Vomiting and Other (See Comments)       fever         Social History               Socioeconomic History    Marital status:        Spouse name: Not on file    Number of children: Not on file    Years of education: Not on file    Highest education level: Not on file   Occupational History    Not on file   Tobacco Use    Smoking status: Current Every Day Smoker    Smokeless tobacco: Never Used   Vaping Use    Vaping Use: Never used   Substance and Sexual Activity    Alcohol use: Yes       Comment: occasionaly    Drug use: Never    Sexual activity: Not on file   Other Topics Concern    Not on file   Social History Narrative    Not on file      Social Determinants of Health          Financial Resource Strain:     Difficulty of Paying Living Expenses:    Food Insecurity:     Worried About Running Out of Food in the Last Year:     [address] in the Last Year:    Transportation Needs:     Lack of Transportation (Medical):  Lack of Transportation (Non-Medical):    Physical Activity:     Days of Exercise per Week:     Minutes of Exercise per Session:    Stress:     Feeling of Stress :    Social Connections:     Frequency of Communication with Friends and Family:     Frequency of Social Gatherings with Friends and Family:     Attends Restoration Services:     Active Member of Clubs or Organizations:     Attends Club or Organization Meetings:     Marital Status:    Intimate Partner Violence:     Fear of Current or Ex-Partner:     Emotionally Abused:     Physically Abused:     Sexually Abused:             Occupation:      Review of Systems   Constitutional: Negative. Negative for activity change, appetite change and unexpected weight change. HENT: Negative. Eyes: Negative. Respiratory: Negative. Negative for cough and shortness of breath. Cardiovascular: Negative. Negative for chest pain and leg swelling. Gastrointestinal: Negative. Negative for abdominal distention, abdominal pain, anal bleeding, blood in stool, constipation, diarrhea, nausea and vomiting. Endocrine: Negative. Genitourinary: Negative. Musculoskeletal: Negative. Negative for arthralgias, back pain, gait problem, joint swelling and myalgias. Skin: Negative. Allergic/Immunologic: Negative. Neurological: Negative. Negative for dizziness, weakness and headaches. Hematological: Negative.          MAMMOGRAM:          ULTRASOUND:       PATHOLOGY:    Diagnosis:   Right breast, \"retro-\", core biopsy: Invasive carcinoma, no special type   (ductal), see comment. Comment:   Procedure- core biopsy   Specimen Laterality-right   Tumor Site-retro-   Histologic Type-invasive carcinoma, no special type (ductal)   Histologic Grade (Beatriz Histologic Score):    Glandular differentiation- score 3    Nuclear pleomorphism- score 3    Mitotic Rate- score 1                      Score-7    Overall Grade-2   DCIS-not identified   Lymphovascular invasion-not identified   Additional Note: The previous ipsilateral invasive and intraductal   carcinoma is noted ([id number]).  Intradepartmental consultation is   obtained. Breast Cancer Marker Studies:     Estrogen Receptors (ER):   -Negative (less than 1%): Internal control cells present and stain as expected: yes     Progesterone Receptors (NE):   -Negative (less than 1%): Internal control cells present and stain as expected: yes     Hormone receptor studies are performed by immunohistochemistry on   formalin-fixed, paraffin-embedded tissue (Roche Benchmark Immunostainer,   [place] anti-ER clone SP1, anti-NE clone 1E2, polymer-based detection   chemistry). ER and NE are evaluated based on the percentage of cells   showing nuclear staining with >1% considered positive for each. Her-2/faustina (c-erb B-2) protein expression: Positive, 3+      ASSESSMENT/PLAN:  Right breast cancer--clinical prognostic Stage IA, ER/NE - HER2+  --MRI breast due to breast density and HER2+ cancer needing to assess size for NAC vs. Adjuvant therapy. --reviewed, size of lesion is 1.6cm  --CT C/A/P, bone scan for recurrent breast cancer--negative  --CBC, CMP, genetic testing--reviewed  --Patient would like to have a bilateral mastectomy with SLNBx. I went over the r/b/a to procedure. I discussed bilateral pec blocks for the procedure. We discussed use of drains.   We discussed possibility of ALND given the prior SLNBx, however will definitely try SLNBx first.       I spent 85 minutes personally with the patient/family/staff/resident(s) in examination, chart and imaging review, discussing natural history and prognosis, differential diagnosis, risks and benefits of treatment and instructions of which more than 50% of the time was spent for counseling and coordinating care.  Rossana Kenyon MD, MSc, FACS  6/4/2021  8:22 AM

## 2021-07-01 NOTE — TELEPHONE ENCOUNTER
[address] [facility],[address] contacted [facility] for prior authorization of outpatient procedure. No prior authorization needed for CPT codes 31139 or 26367 with Dr. Ashanti Davies at [address] [place] [address] in [place]. MA used automated system to verify, authorization Specialist. Reference number [id number]. MA scanned authorization form in media tab.     Electronically signed by Gaurav Casey MA on 7/1/21 at 3:20 PM EDT

## 2021-07-02 NOTE — PROGRESS NOTES
[facility] PRE-ADMISSION TESTING GENERAL INSTRUCTIONS- [facility]-phone number:[phone]    GENERAL INSTRUCTIONS  [x] Antibacterial Soap shower Night before and/or AM of Surgery  [] Harley wipe instruction sheet and wipes given. [x] Nothing by mouth after midnight, including gum, candy, mints, or water. [x] You may brush your teeth, gargle, but do NOT swallow water. []Hibiclens shower  the night before and the morning of surgery. Do not use             Hibiclens on your face or head. [x]No smoking, chewing tobacco, illegal drugs, or alcohol within 24 hours of your surgery. [x] Jewelry, valuables or body piercing's should not be brought to the hospital. All body and/or tongue piercing's must be removed prior to arriving to hospital.  ALL hair pins must be removed. [x] Do not wear makeup, lotions, powders, deodorant. Nail polish as directed by the nurse. [x] Arrange transportation with a responsible adult  to and from the hospital. If you do not have a responsible adult  to transport you, you will need to make arrangements with a medical transportation company (i.e. [employer]. A Uber/taxi/bus is not appropriate unless you are accompanied by a responsible adult ). Arrange for someone to be with you for the remainder of the day and for 24 hours after your procedure due to having had anesthesia. Who will be your  for transportation?______husband__________   Who will be staying with you for 24 hrs after your procedure?__________husband_______  [x] Bring insurance card and photo ID.  [] Transfusion Bracelet: Please bring with you to hospital, day of surgery  [] Bring urine specimen day of surgery. Any small container is acceptable. [] Use inhalers the morning of surgery and bring with you to hospital.  [] Bring copy of living will or healthcare power of  papers to be placed in your electronic record.   [] CPAP/BI-PAP: Please bring your machine if you are to spend the night in the hospital.     PARKING INSTRUCTIONS:   [x] Arrival Time:____1030_________  · [x] Parking lot '\"I\"  is located on [facility] (the corner of [facility] and [facility]). To enter, press the button and the gate will lift. A free token will be provided to exit the lot. One car per patient is allowed to park in this lot. All other cars are to park on [address] [place] [address] Street either in the parking garage or the handicap lot. [] To reach the [facility] lobby from [address] [place] [address], upon entering the hospital, take elevator B to the 3rd floor. EDUCATION INSTRUCTIONS:      [] Knee or hip replacement booklet & exercise pamphlets given. [] Vineet[address] placed in chart. [] Pre-admission Testing educational folder given  [] Incentive Spirometry,coughing & deep breathing exercises reviewed. []Medication information sheet(s)   []Fluoroscopy-Xray used in surgery reviewed with patient. Educational pamphlet placed in chart. []Pain: Post-op pain is normal and to be expected. You will be asked to rate your pain from 0-10(a zero is not acceptable-education is needed). Your post-op pain goal is:  [] Ask your nurse for your pain medication. [] Joint camp offered. [] Joint replacement booklets given. [] Other:___________________________    MEDICATION INSTRUCTIONS:   []Bring a complete list of your medications, please write the last time you took the medicine, give this list to the nurse.   [] Take the following medications the morning of surgery with 1-2 ounces of water:   [] Stop herbal supplements and vitamins 5 days before your surgery. [] DO NOT take any diabetic medicine the morning of surgery. Follow instructions for insulin the day before surgery. [] If you are diabetic and your blood sugar is low or you feel symptomatic, you may drink 1-2 ounces of apple juice or take a glucose tablet.   The morning of your procedure, you may call the pre-op area if you have concerns about your blood sugar [phone]. [] Use your inhalers the morning of surgery. Bring your emergency inhaler with you day of surgery. [] Follow physician instructions regarding any blood thinners you may be taking. WHAT TO EXPECT:  [x] The day of surgery you will be greeted and checked in by the [employer].  In addition, you will be registered in the [facility] by a Patient Access Representative. Please bring your photo ID and insurance card. A nurse will greet you in accordance to the time you are needed in the pre-op area to prepare you for surgery. Please do not be discouraged if you are not greeted in the order you arrive as there are many variables that are involved in patient preparation. Your patience is greatly appreciated as you wait for your nurse. Please bring in items such as: books, magazines, newspapers, electronics, or any other items  to occupy your time in the waiting area. []  Delays may occur with surgery and staff will make a sincere effort to keep you informed of delays. If any delays occur with your procedure, we apologize ahead of time for your inconvenience as we recognize the value of your time.

## 2021-07-02 NOTE — PROGRESS NOTES
Spoke to patient states Dr Ayesha Amaya office told her to arrive for surgery at [phone]. States she is having a blue dye test. Spoke with both Dr Ayesha wilkins and JESS toro to notify then blue dye tet was not scheduled as yet.

## 2021-07-06 ENCOUNTER — APPOINTMENT (OUTPATIENT)
Dept: GENERAL RADIOLOGY | Age: 51
End: 2021-07-06
Attending: SURGERY
Payer: COMMERCIAL

## 2021-07-06 ENCOUNTER — HOSPITAL ENCOUNTER (OUTPATIENT)
Age: 51
Setting detail: OBSERVATION
Discharge: HOME HEALTH | End: 2021-07-07
Attending: SURGERY | Admitting: SURGERY
Payer: COMMERCIAL

## 2021-07-06 ENCOUNTER — HOSPITAL ENCOUNTER (OUTPATIENT)
Dept: NUCLEAR MEDICINE | Age: 51
Discharge: HOME | End: 2021-07-08
Payer: COMMERCIAL

## 2021-07-06 ENCOUNTER — ANESTHESIA (OUTPATIENT)
Dept: OPERATING ROOM | Age: 51
End: 2021-07-06
Payer: COMMERCIAL

## 2021-07-06 ENCOUNTER — ANESTHESIA EVENT (OUTPATIENT)
Dept: OPERATING ROOM | Age: 51
End: 2021-07-06
Payer: COMMERCIAL

## 2021-07-06 VITALS — OXYGEN SATURATION: 84 % | TEMPERATURE: 95.4 F | DIASTOLIC BLOOD PRESSURE: 89 MMHG | SYSTOLIC BLOOD PRESSURE: 126 MMHG

## 2021-07-06 DIAGNOSIS — Z01.818 PRE-OP TESTING: Primary | ICD-10-CM

## 2021-07-06 DIAGNOSIS — Z90.13 S/P MASTECTOMY, BILATERAL: ICD-10-CM

## 2021-07-06 DIAGNOSIS — C50.911 INVASIVE DUCTAL CARCINOMA OF RIGHT BREAST (HCC): ICD-10-CM

## 2021-07-06 LAB
HCT VFR BLD CALC: 45 % (ref 34–48)
HEMOGLOBIN: 15.4 G/DL (ref 11.5–15.5)
MCH RBC QN AUTO: 34.1 PG (ref 26–35)
MCHC RBC AUTO-ENTMCNC: 34.2 % (ref 32–34.5)
MCV RBC AUTO: 99.8 FL (ref 80–99.9)
PDW BLD-RTO: 12.1 FL (ref 11.5–15)
PLATELET # BLD: 228 E9/L (ref 130–450)
PMV BLD AUTO: 9.1 FL (ref 7–12)
RBC # BLD: 4.51 E12/L (ref 3.5–5.5)
WBC # BLD: 8.8 E9/L (ref 4.5–11.5)

## 2021-07-06 PROCEDURE — 36415 COLL VENOUS BLD VENIPUNCTURE: CPT

## 2021-07-06 PROCEDURE — 6360000002 HC RX W HCPCS: Performed by: SURGERY

## 2021-07-06 PROCEDURE — 2500000003 HC RX 250 WO HCPCS: Performed by: ANESTHESIOLOGY

## 2021-07-06 PROCEDURE — 96372 THER/PROPH/DIAG INJ SC/IM: CPT

## 2021-07-06 PROCEDURE — 19307 MAST MOD RAD: CPT | Performed by: SURGERY

## 2021-07-06 PROCEDURE — 2720000010 HC SURG SUPPLY STERILE: Performed by: SURGERY

## 2021-07-06 PROCEDURE — 3700000000 HC ANESTHESIA ATTENDED CARE: Performed by: SURGERY

## 2021-07-06 PROCEDURE — 2700000000 HC OXYGEN THERAPY PER DAY

## 2021-07-06 PROCEDURE — 2709999900 HC NON-CHARGEABLE SUPPLY: Performed by: SURGERY

## 2021-07-06 PROCEDURE — 38792 RA TRACER ID OF SENTINL NODE: CPT

## 2021-07-06 PROCEDURE — 76942 ECHO GUIDE FOR BIOPSY: CPT | Performed by: ANESTHESIOLOGY

## 2021-07-06 PROCEDURE — C1788 PORT, INDWELLING, IMP: HCPCS | Performed by: SURGERY

## 2021-07-06 PROCEDURE — 38525 BIOPSY/REMOVAL LYMPH NODES: CPT | Performed by: SURGERY

## 2021-07-06 PROCEDURE — 38900 IO MAP OF SENT LYMPH NODE: CPT | Performed by: SURGERY

## 2021-07-06 PROCEDURE — 2580000003 HC RX 258: Performed by: SURGERY

## 2021-07-06 PROCEDURE — C1729 CATH, DRAINAGE: HCPCS | Performed by: SURGERY

## 2021-07-06 PROCEDURE — 77001 FLUOROGUIDE FOR VEIN DEVICE: CPT | Performed by: SURGERY

## 2021-07-06 PROCEDURE — 71045 X-RAY EXAM CHEST 1 VIEW: CPT

## 2021-07-06 PROCEDURE — 7100000001 HC PACU RECOVERY - ADDTL 15 MIN: Performed by: SURGERY

## 2021-07-06 PROCEDURE — 85027 COMPLETE CBC AUTOMATED: CPT

## 2021-07-06 PROCEDURE — 2500000003 HC RX 250 WO HCPCS: Performed by: SURGERY

## 2021-07-06 PROCEDURE — 3600000003 HC SURGERY LEVEL 3 BASE: Performed by: SURGERY

## 2021-07-06 PROCEDURE — A9520 TC99 TILMANOCEPT DIAG 0.5MCI: HCPCS | Performed by: RADIOLOGY

## 2021-07-06 PROCEDURE — 88307 TISSUE EXAM BY PATHOLOGIST: CPT

## 2021-07-06 PROCEDURE — 6370000000 HC RX 637 (ALT 250 FOR IP): Performed by: STUDENT IN AN ORGANIZED HEALTH CARE EDUCATION/TRAINING PROGRAM

## 2021-07-06 PROCEDURE — 3700000001 HC ADD 15 MINUTES (ANESTHESIA): Performed by: SURGERY

## 2021-07-06 PROCEDURE — 6370000000 HC RX 637 (ALT 250 FOR IP)

## 2021-07-06 PROCEDURE — 88305 TISSUE EXAM BY PATHOLOGIST: CPT

## 2021-07-06 PROCEDURE — 2580000003 HC RX 258: Performed by: STUDENT IN AN ORGANIZED HEALTH CARE EDUCATION/TRAINING PROGRAM

## 2021-07-06 PROCEDURE — 36561 INSERT TUNNELED CV CATH: CPT | Performed by: SURGERY

## 2021-07-06 PROCEDURE — 3600000013 HC SURGERY LEVEL 3 ADDTL 15MIN: Performed by: SURGERY

## 2021-07-06 PROCEDURE — 2780000010 HC IMPLANT OTHER: Performed by: SURGERY

## 2021-07-06 PROCEDURE — 6360000002 HC RX W HCPCS: Performed by: NURSE ANESTHETIST, CERTIFIED REGISTERED

## 2021-07-06 PROCEDURE — 3430000000 HC RX DIAGNOSTIC RADIOPHARMACEUTICAL: Performed by: RADIOLOGY

## 2021-07-06 PROCEDURE — 6360000002 HC RX W HCPCS: Performed by: ANESTHESIOLOGY

## 2021-07-06 PROCEDURE — 2500000003 HC RX 250 WO HCPCS

## 2021-07-06 PROCEDURE — 19303 MAST SIMPLE COMPLETE: CPT | Performed by: SURGERY

## 2021-07-06 PROCEDURE — 6360000002 HC RX W HCPCS

## 2021-07-06 PROCEDURE — 7100000000 HC PACU RECOVERY - FIRST 15 MIN: Performed by: SURGERY

## 2021-07-06 PROCEDURE — 2580000003 HC RX 258: Performed by: NURSE ANESTHETIST, CERTIFIED REGISTERED

## 2021-07-06 PROCEDURE — G0378 HOSPITAL OBSERVATION PER HR: HCPCS

## 2021-07-06 PROCEDURE — 3209999900 FLUORO FOR SURGICAL PROCEDURES

## 2021-07-06 DEVICE — PORT INFUS 8FR PWR INJ CT FOR VASC ACCS CATH: Type: IMPLANTABLE DEVICE | Site: CHEST  WALL | Status: FUNCTIONAL

## 2021-07-06 RX ORDER — ONDANSETRON 2 MG/ML
4 INJECTION INTRAMUSCULAR; INTRAVENOUS EVERY 6 HOURS PRN
Status: DISCONTINUED | OUTPATIENT
Start: 2021-07-06 | End: 2021-07-07 | Stop reason: HOSPADM

## 2021-07-06 RX ORDER — ACETAMINOPHEN 500 MG
TABLET ORAL
Status: DISCONTINUED
Start: 2021-07-06 | End: 2021-07-06

## 2021-07-06 RX ORDER — SODIUM CHLORIDE 0.9 % (FLUSH) 0.9 %
10 SYRINGE (ML) INJECTION EVERY 12 HOURS SCHEDULED
Status: DISCONTINUED | OUTPATIENT
Start: 2021-07-06 | End: 2021-07-07 | Stop reason: HOSPADM

## 2021-07-06 RX ORDER — HEPARIN SODIUM (PORCINE) LOCK FLUSH IV SOLN 100 UNIT/ML 100 UNIT/ML
SOLUTION INTRAVENOUS PRN
Status: DISCONTINUED | OUTPATIENT
Start: 2021-07-06 | End: 2021-07-06 | Stop reason: HOSPADM

## 2021-07-06 RX ORDER — SODIUM CHLORIDE 0.9 % (FLUSH) 0.9 %
10 SYRINGE (ML) INJECTION EVERY 12 HOURS SCHEDULED
Status: DISCONTINUED | OUTPATIENT
Start: 2021-07-06 | End: 2021-07-06 | Stop reason: HOSPADM

## 2021-07-06 RX ORDER — SODIUM CHLORIDE, SODIUM LACTATE, POTASSIUM CHLORIDE, CALCIUM CHLORIDE 600; 310; 30; 20 MG/100ML; MG/100ML; MG/100ML; MG/100ML
INJECTION, SOLUTION INTRAVENOUS CONTINUOUS PRN
Status: DISCONTINUED | OUTPATIENT
Start: 2021-07-06 | End: 2021-07-06 | Stop reason: SDUPTHER

## 2021-07-06 RX ORDER — DEXAMETHASONE SODIUM PHOSPHATE 10 MG/ML
INJECTION INTRAMUSCULAR; INTRAVENOUS PRN
Status: DISCONTINUED | OUTPATIENT
Start: 2021-07-06 | End: 2021-07-06 | Stop reason: SDUPTHER

## 2021-07-06 RX ORDER — METHYLENE BLUE 10 MG/ML
INJECTION INTRAVENOUS PRN
Status: DISCONTINUED | OUTPATIENT
Start: 2021-07-06 | End: 2021-07-06 | Stop reason: HOSPADM

## 2021-07-06 RX ORDER — LIDOCAINE HYDROCHLORIDE 20 MG/ML
INJECTION, SOLUTION INTRAVENOUS PRN
Status: DISCONTINUED | OUTPATIENT
Start: 2021-07-06 | End: 2021-07-06 | Stop reason: SDUPTHER

## 2021-07-06 RX ORDER — PROPOFOL 10 MG/ML
INJECTION, EMULSION INTRAVENOUS PRN
Status: DISCONTINUED | OUTPATIENT
Start: 2021-07-06 | End: 2021-07-06 | Stop reason: SDUPTHER

## 2021-07-06 RX ORDER — MIDAZOLAM HYDROCHLORIDE 1 MG/ML
INJECTION INTRAMUSCULAR; INTRAVENOUS PRN
Status: DISCONTINUED | OUTPATIENT
Start: 2021-07-06 | End: 2021-07-06 | Stop reason: SDUPTHER

## 2021-07-06 RX ORDER — HEPARIN SODIUM 1000 [USP'U]/ML
INJECTION, SOLUTION INTRAVENOUS; SUBCUTANEOUS PRN
Status: DISCONTINUED | OUTPATIENT
Start: 2021-07-06 | End: 2021-07-06 | Stop reason: HOSPADM

## 2021-07-06 RX ORDER — ONDANSETRON 2 MG/ML
INJECTION INTRAMUSCULAR; INTRAVENOUS PRN
Status: DISCONTINUED | OUTPATIENT
Start: 2021-07-06 | End: 2021-07-06 | Stop reason: SDUPTHER

## 2021-07-06 RX ORDER — BUPIVACAINE HYDROCHLORIDE AND EPINEPHRINE 2.5; 5 MG/ML; UG/ML
INJECTION, SOLUTION EPIDURAL; INFILTRATION; INTRACAUDAL; PERINEURAL PRN
Status: DISCONTINUED | OUTPATIENT
Start: 2021-07-06 | End: 2021-07-06 | Stop reason: HOSPADM

## 2021-07-06 RX ORDER — OXYCODONE HYDROCHLORIDE 10 MG/1
10 TABLET ORAL EVERY 4 HOURS PRN
Status: DISCONTINUED | OUTPATIENT
Start: 2021-07-06 | End: 2021-07-07 | Stop reason: HOSPADM

## 2021-07-06 RX ORDER — SODIUM CHLORIDE 0.9 % (FLUSH) 0.9 %
10 SYRINGE (ML) INJECTION PRN
Status: DISCONTINUED | OUTPATIENT
Start: 2021-07-06 | End: 2021-07-07 | Stop reason: HOSPADM

## 2021-07-06 RX ORDER — ACETAMINOPHEN 500 MG
1000 TABLET ORAL ONCE
Status: COMPLETED | OUTPATIENT
Start: 2021-07-06 | End: 2021-07-06

## 2021-07-06 RX ORDER — SODIUM CHLORIDE 9 MG/ML
25 INJECTION, SOLUTION INTRAVENOUS PRN
Status: DISCONTINUED | OUTPATIENT
Start: 2021-07-06 | End: 2021-07-07 | Stop reason: HOSPADM

## 2021-07-06 RX ORDER — LABETALOL HYDROCHLORIDE 5 MG/ML
INJECTION, SOLUTION INTRAVENOUS PRN
Status: DISCONTINUED | OUTPATIENT
Start: 2021-07-06 | End: 2021-07-06 | Stop reason: SDUPTHER

## 2021-07-06 RX ORDER — FENTANYL CITRATE 50 UG/ML
INJECTION, SOLUTION INTRAMUSCULAR; INTRAVENOUS PRN
Status: DISCONTINUED | OUTPATIENT
Start: 2021-07-06 | End: 2021-07-06 | Stop reason: SDUPTHER

## 2021-07-06 RX ORDER — ROCURONIUM BROMIDE 10 MG/ML
INJECTION, SOLUTION INTRAVENOUS PRN
Status: DISCONTINUED | OUTPATIENT
Start: 2021-07-06 | End: 2021-07-06 | Stop reason: SDUPTHER

## 2021-07-06 RX ORDER — SODIUM CHLORIDE 9 MG/ML
25 INJECTION, SOLUTION INTRAVENOUS PRN
Status: DISCONTINUED | OUTPATIENT
Start: 2021-07-06 | End: 2021-07-06 | Stop reason: HOSPADM

## 2021-07-06 RX ORDER — ACETAMINOPHEN 500 MG
TABLET ORAL
Status: COMPLETED
Start: 2021-07-06 | End: 2021-07-06

## 2021-07-06 RX ORDER — ROPIVACAINE HYDROCHLORIDE 5 MG/ML
INJECTION, SOLUTION EPIDURAL; INFILTRATION; PERINEURAL
Status: COMPLETED | OUTPATIENT
Start: 2021-07-06 | End: 2021-07-06

## 2021-07-06 RX ORDER — ACETAMINOPHEN 500 MG
1000 TABLET ORAL EVERY 6 HOURS
Status: DISCONTINUED | OUTPATIENT
Start: 2021-07-07 | End: 2021-07-07 | Stop reason: HOSPADM

## 2021-07-06 RX ORDER — ESMOLOL HYDROCHLORIDE 10 MG/ML
INJECTION INTRAVENOUS PRN
Status: DISCONTINUED | OUTPATIENT
Start: 2021-07-06 | End: 2021-07-06 | Stop reason: SDUPTHER

## 2021-07-06 RX ORDER — OXYCODONE HYDROCHLORIDE 5 MG/1
5 TABLET ORAL EVERY 4 HOURS PRN
Status: DISCONTINUED | OUTPATIENT
Start: 2021-07-06 | End: 2021-07-07 | Stop reason: HOSPADM

## 2021-07-06 RX ORDER — SODIUM CHLORIDE 0.9 % (FLUSH) 0.9 %
10 SYRINGE (ML) INJECTION PRN
Status: DISCONTINUED | OUTPATIENT
Start: 2021-07-06 | End: 2021-07-06 | Stop reason: HOSPADM

## 2021-07-06 RX ORDER — OXYCODONE HYDROCHLORIDE 5 MG/1
5 TABLET ORAL EVERY 6 HOURS PRN
Qty: 28 TABLET | Refills: 0 | Status: SHIPPED | OUTPATIENT
Start: 2021-07-06 | End: 2021-07-07 | Stop reason: HOSPADM

## 2021-07-06 RX ORDER — SODIUM CHLORIDE 9 MG/ML
INJECTION, SOLUTION INTRAVENOUS CONTINUOUS
Status: DISCONTINUED | OUTPATIENT
Start: 2021-07-06 | End: 2021-07-06

## 2021-07-06 RX ADMIN — ROPIVACAINE HYDROCHLORIDE 30 ML: 5 INJECTION EPIDURAL; INFILTRATION; PERINEURAL at 14:27

## 2021-07-06 RX ADMIN — FENTANYL CITRATE 50 MCG: 50 INJECTION, SOLUTION INTRAMUSCULAR; INTRAVENOUS at 14:42

## 2021-07-06 RX ADMIN — SODIUM CHLORIDE: 9 INJECTION, SOLUTION INTRAVENOUS at 11:12

## 2021-07-06 RX ADMIN — ONDANSETRON HYDROCHLORIDE 4 MG: 2 INJECTION, SOLUTION INTRAMUSCULAR; INTRAVENOUS at 17:54

## 2021-07-06 RX ADMIN — ROCURONIUM BROMIDE 50 MG: 10 INJECTION, SOLUTION INTRAVENOUS at 13:54

## 2021-07-06 RX ADMIN — SODIUM CHLORIDE, POTASSIUM CHLORIDE, SODIUM LACTATE AND CALCIUM CHLORIDE: 600; 310; 30; 20 INJECTION, SOLUTION INTRAVENOUS at 16:16

## 2021-07-06 RX ADMIN — ROCURONIUM BROMIDE 20 MG: 10 INJECTION, SOLUTION INTRAVENOUS at 14:53

## 2021-07-06 RX ADMIN — Medication 0.6 MG: at 18:25

## 2021-07-06 RX ADMIN — Medication 3 MG: at 18:25

## 2021-07-06 RX ADMIN — MIDAZOLAM 2 MG: 1 INJECTION INTRAMUSCULAR; INTRAVENOUS at 13:47

## 2021-07-06 RX ADMIN — SODIUM CHLORIDE: 9 INJECTION, SOLUTION INTRAVENOUS at 14:20

## 2021-07-06 RX ADMIN — Medication 2000 MG: at 14:05

## 2021-07-06 RX ADMIN — ESMOLOL HYDROCHLORIDE 30 MG: 10 INJECTION, SOLUTION INTRAVENOUS at 14:32

## 2021-07-06 RX ADMIN — FENTANYL CITRATE 50 MCG: 50 INJECTION, SOLUTION INTRAMUSCULAR; INTRAVENOUS at 17:21

## 2021-07-06 RX ADMIN — ENOXAPARIN SODIUM 40 MG: 40 INJECTION SUBCUTANEOUS at 11:13

## 2021-07-06 RX ADMIN — Medication 1000 MG: at 12:39

## 2021-07-06 RX ADMIN — ROCURONIUM BROMIDE 10 MG: 10 INJECTION, SOLUTION INTRAVENOUS at 16:32

## 2021-07-06 RX ADMIN — OXYCODONE 5 MG: 5 TABLET ORAL at 22:36

## 2021-07-06 RX ADMIN — Medication 2000 MG: at 17:54

## 2021-07-06 RX ADMIN — MIDAZOLAM 1 MG: 1 INJECTION INTRAMUSCULAR; INTRAVENOUS at 13:52

## 2021-07-06 RX ADMIN — ROCURONIUM BROMIDE 20 MG: 10 INJECTION, SOLUTION INTRAVENOUS at 15:46

## 2021-07-06 RX ADMIN — TILMANOCEPT 0.5 MILLICURIE: KIT at 11:21

## 2021-07-06 RX ADMIN — FENTANYL CITRATE 100 MCG: 50 INJECTION, SOLUTION INTRAMUSCULAR; INTRAVENOUS at 13:54

## 2021-07-06 RX ADMIN — LIDOCAINE HYDROCHLORIDE 100 MG: 20 INJECTION, SOLUTION INTRAVENOUS at 13:54

## 2021-07-06 RX ADMIN — FENTANYL CITRATE 50 MCG: 50 INJECTION, SOLUTION INTRAMUSCULAR; INTRAVENOUS at 16:32

## 2021-07-06 RX ADMIN — LABETALOL HYDROCHLORIDE 5 MG: 5 INJECTION INTRAVENOUS at 15:30

## 2021-07-06 RX ADMIN — DEXAMETHASONE SODIUM PHOSPHATE 10 MG: 10 INJECTION INTRAMUSCULAR; INTRAVENOUS at 14:05

## 2021-07-06 RX ADMIN — SODIUM CHLORIDE, PRESERVATIVE FREE 10 ML: 5 INJECTION INTRAVENOUS at 22:29

## 2021-07-06 RX ADMIN — FENTANYL CITRATE 100 MCG: 50 INJECTION, SOLUTION INTRAMUSCULAR; INTRAVENOUS at 18:36

## 2021-07-06 RX ADMIN — PROPOFOL 160 MG: 10 INJECTION, EMULSION INTRAVENOUS at 13:54

## 2021-07-06 RX ADMIN — ACETAMINOPHEN 1000 MG: 500 TABLET ORAL at 12:39

## 2021-07-06 ASSESSMENT — PULMONARY FUNCTION TESTS
PIF_VALUE: 2
PIF_VALUE: 27
PIF_VALUE: 23
PIF_VALUE: 24
PIF_VALUE: 23
PIF_VALUE: 23
PIF_VALUE: 25
PIF_VALUE: 24
PIF_VALUE: 23
PIF_VALUE: 24
PIF_VALUE: 22
PIF_VALUE: 23
PIF_VALUE: 0
PIF_VALUE: 24
PIF_VALUE: 25
PIF_VALUE: 24
PIF_VALUE: 23
PIF_VALUE: 27
PIF_VALUE: 17
PIF_VALUE: 23
PIF_VALUE: 25
PIF_VALUE: 23
PIF_VALUE: 25
PIF_VALUE: 23
PIF_VALUE: 25
PIF_VALUE: 23
PIF_VALUE: 23
PIF_VALUE: 25
PIF_VALUE: 23
PIF_VALUE: 26
PIF_VALUE: 23
PIF_VALUE: 22
PIF_VALUE: 23
PIF_VALUE: 23
PIF_VALUE: 25
PIF_VALUE: 23
PIF_VALUE: 25
PIF_VALUE: 27
PIF_VALUE: 24
PIF_VALUE: 24
PIF_VALUE: 22
PIF_VALUE: 23
PIF_VALUE: 24
PIF_VALUE: 25
PIF_VALUE: 24
PIF_VALUE: 2
PIF_VALUE: 23
PIF_VALUE: 23
PIF_VALUE: 2
PIF_VALUE: 24
PIF_VALUE: 25
PIF_VALUE: 21
PIF_VALUE: 23
PIF_VALUE: 25
PIF_VALUE: 22
PIF_VALUE: 23
PIF_VALUE: 22
PIF_VALUE: 0
PIF_VALUE: 22
PIF_VALUE: 23
PIF_VALUE: 25
PIF_VALUE: 0
PIF_VALUE: 25
PIF_VALUE: 23
PIF_VALUE: 24
PIF_VALUE: 25
PIF_VALUE: 26
PIF_VALUE: 23
PIF_VALUE: 26
PIF_VALUE: 23
PIF_VALUE: 25
PIF_VALUE: 25
PIF_VALUE: 23
PIF_VALUE: 25
PIF_VALUE: 23
PIF_VALUE: 26
PIF_VALUE: 23
PIF_VALUE: 23
PIF_VALUE: 29
PIF_VALUE: 1
PIF_VALUE: 25
PIF_VALUE: 23
PIF_VALUE: 22
PIF_VALUE: 26
PIF_VALUE: 25
PIF_VALUE: 25
PIF_VALUE: 23
PIF_VALUE: 23
PIF_VALUE: 24
PIF_VALUE: 25
PIF_VALUE: 25
PIF_VALUE: 23
PIF_VALUE: 23
PIF_VALUE: 24
PIF_VALUE: 22
PIF_VALUE: 25
PIF_VALUE: 1
PIF_VALUE: 19
PIF_VALUE: 24
PIF_VALUE: 23
PIF_VALUE: 23
PIF_VALUE: 24
PIF_VALUE: 25
PIF_VALUE: 26
PIF_VALUE: 23
PIF_VALUE: 23
PIF_VALUE: 25
PIF_VALUE: 25
PIF_VALUE: 23
PIF_VALUE: 30
PIF_VALUE: 25
PIF_VALUE: 25
PIF_VALUE: 31
PIF_VALUE: 26
PIF_VALUE: 23
PIF_VALUE: 25
PIF_VALUE: 23
PIF_VALUE: 23
PIF_VALUE: 27
PIF_VALUE: 26
PIF_VALUE: 23
PIF_VALUE: 2
PIF_VALUE: 24
PIF_VALUE: 23
PIF_VALUE: 33
PIF_VALUE: 23
PIF_VALUE: 25
PIF_VALUE: 25
PIF_VALUE: 22
PIF_VALUE: 25
PIF_VALUE: 23
PIF_VALUE: 22
PIF_VALUE: 25
PIF_VALUE: 24
PIF_VALUE: 16
PIF_VALUE: 23
PIF_VALUE: 22
PIF_VALUE: 22
PIF_VALUE: 24
PIF_VALUE: 25
PIF_VALUE: 25
PIF_VALUE: 22
PIF_VALUE: 30
PIF_VALUE: 23
PIF_VALUE: 24
PIF_VALUE: 25
PIF_VALUE: 23
PIF_VALUE: 24
PIF_VALUE: 23
PIF_VALUE: 25
PIF_VALUE: 22
PIF_VALUE: 25
PIF_VALUE: 25
PIF_VALUE: 23
PIF_VALUE: 22
PIF_VALUE: 25
PIF_VALUE: 23
PIF_VALUE: 23
PIF_VALUE: 26
PIF_VALUE: 23
PIF_VALUE: 26
PIF_VALUE: 23
PIF_VALUE: 24
PIF_VALUE: 23
PIF_VALUE: 25
PIF_VALUE: 23
PIF_VALUE: 26
PIF_VALUE: 23
PIF_VALUE: 22
PIF_VALUE: 23
PIF_VALUE: 26
PIF_VALUE: 23
PIF_VALUE: 24
PIF_VALUE: 24
PIF_VALUE: 23
PIF_VALUE: 23
PIF_VALUE: 24
PIF_VALUE: 23
PIF_VALUE: 23
PIF_VALUE: 24
PIF_VALUE: 23
PIF_VALUE: 13
PIF_VALUE: 18
PIF_VALUE: 23
PIF_VALUE: 26
PIF_VALUE: 24
PIF_VALUE: 23
PIF_VALUE: 26
PIF_VALUE: 24
PIF_VALUE: 23
PIF_VALUE: 24
PIF_VALUE: 1
PIF_VALUE: 23
PIF_VALUE: 26
PIF_VALUE: 23
PIF_VALUE: 25
PIF_VALUE: 16
PIF_VALUE: 23
PIF_VALUE: 2
PIF_VALUE: 26
PIF_VALUE: 22
PIF_VALUE: 23
PIF_VALUE: 24
PIF_VALUE: 23
PIF_VALUE: 23
PIF_VALUE: 22
PIF_VALUE: 23
PIF_VALUE: 25
PIF_VALUE: 23
PIF_VALUE: 24
PIF_VALUE: 3
PIF_VALUE: 23
PIF_VALUE: 23
PIF_VALUE: 24
PIF_VALUE: 28
PIF_VALUE: 1
PIF_VALUE: 24
PIF_VALUE: 23
PIF_VALUE: 23
PIF_VALUE: 1
PIF_VALUE: 25
PIF_VALUE: 23
PIF_VALUE: 25
PIF_VALUE: 22
PIF_VALUE: 22
PIF_VALUE: 26
PIF_VALUE: 25
PIF_VALUE: 23
PIF_VALUE: 22
PIF_VALUE: 23
PIF_VALUE: 21
PIF_VALUE: 26
PIF_VALUE: 20
PIF_VALUE: 23
PIF_VALUE: 26
PIF_VALUE: 24
PIF_VALUE: 24
PIF_VALUE: 8
PIF_VALUE: 23
PIF_VALUE: 23
PIF_VALUE: 25
PIF_VALUE: 26
PIF_VALUE: 25
PIF_VALUE: 23
PIF_VALUE: 23
PIF_VALUE: 2
PIF_VALUE: 24
PIF_VALUE: 22
PIF_VALUE: 23
PIF_VALUE: 22
PIF_VALUE: 26
PIF_VALUE: 21
PIF_VALUE: 25
PIF_VALUE: 1
PIF_VALUE: 23
PIF_VALUE: 25
PIF_VALUE: 23
PIF_VALUE: 22
PIF_VALUE: 26
PIF_VALUE: 23
PIF_VALUE: 26
PIF_VALUE: 24
PIF_VALUE: 23
PIF_VALUE: 0
PIF_VALUE: 26
PIF_VALUE: 25
PIF_VALUE: 24
PIF_VALUE: 25

## 2021-07-06 ASSESSMENT — PAIN DESCRIPTION - LOCATION
LOCATION: BREAST

## 2021-07-06 ASSESSMENT — PAIN DESCRIPTION - PAIN TYPE
TYPE: SURGICAL PAIN

## 2021-07-06 ASSESSMENT — PAIN DESCRIPTION - DESCRIPTORS
DESCRIPTORS: SHARP
DESCRIPTORS: SHARP;STABBING;DISCOMFORT

## 2021-07-06 ASSESSMENT — PAIN DESCRIPTION - ORIENTATION
ORIENTATION: RIGHT
ORIENTATION: RIGHT

## 2021-07-06 ASSESSMENT — LIFESTYLE VARIABLES: SMOKING_STATUS: 1

## 2021-07-06 ASSESSMENT — PAIN SCALES - GENERAL
PAINLEVEL_OUTOF10: 0
PAINLEVEL_OUTOF10: 0
PAINLEVEL_OUTOF10: 4
PAINLEVEL_OUTOF10: 0
PAINLEVEL_OUTOF10: 3
PAINLEVEL_OUTOF10: 5
PAINLEVEL_OUTOF10: 0

## 2021-07-06 ASSESSMENT — PAIN - FUNCTIONAL ASSESSMENT: PAIN_FUNCTIONAL_ASSESSMENT: 0-10

## 2021-07-06 NOTE — INTERVAL H&P NOTE
Update History & Physical    The patient's History and Physical of June 4, 2021 was reviewed with the patient and I examined the patient. There was no change. The surgical site was confirmed by the patient and me. Plan: The risks, benefits, expected outcome, and alternative to the recommended procedure have been discussed with the patient. Patient understands and wants to proceed with the procedure.      Electronically signed by Yolanda Hartmann MD on 7/6/2021 at 1:00 PM

## 2021-07-06 NOTE — ANESTHESIA PROCEDURE NOTES
Peripheral Block    Patient location during procedure: OR  Staffing  Performed: anesthesiologist and other anesthesia staff   Anesthesiologist: Brenda Sierra MD  Other anesthesia staff: Guy Milligan RN  Preanesthetic Checklist  Completed: patient identified, IV checked, site marked, risks and benefits discussed, surgical consent, monitors and equipment checked, pre-op evaluation, timeout performed, anesthesia consent given, oxygen available and patient being monitored  Peripheral Block  Patient position: supine  Prep: DuraPrep  Patient monitoring: cardiac monitor, continuous pulse ox, continuous capnometry, frequent blood pressure checks and IV access  Block type: PECS I and PECS II  Laterality: bilateral  Injection technique: single-shot  Guidance: ultrasound guided  Local infiltration: ropivacaine  Provider prep: mask and sterile gloves  Local infiltration: ropivacaine  Needle  Needle type: short-bevel   Needle gauge: 22 G  Needle length: 8 cm  Needle localization: ultrasound guidance  Assessment  Injection assessment: negative aspiration for heme  Slow fractionated injection: yes  Hemodynamics: stable  Additional Notes  Mixture of Ropivacaine 0,5% 15 mL with 10 mL PF saline and Decadron 4 mg injected in each side, 10mL mixture in in PEC I and 15 mL mixture in PEC II.  Medications Administered  Ropivacaine (NAROPIN) injection 0.5%, 30 mL  Reason for block: post-op pain management and at surgeon's request

## 2021-07-06 NOTE — PROGRESS NOTES
Patient admitted to PACU.  All appropriate monitors applied, siderails up, bed locked, and in low position

## 2021-07-06 NOTE — OP NOTE
[facility]  OPERATIVE REPORT    Mell Rae  1970      DATE OF PROCEDURE: 7/6/2021     SURGEON: Rhonda Aguilar MD, MSc, FACS     ASSISTANT: STEPHANIE Trejo, PhD, MD, PGY-IV; I Yola Wheatley, M3     PREOPERATIVE DIAGNOSIS:  Recurrent right breast cancer; ER/OK-, HER2+; Stage IA. POSTOPERATIVE DIAGNOSIS: Same    OPERATION: right modified radical mastectomy; left prophylactic mastectomy; injection methylene blue dye; insertion of left subclavian mediport 8F Bard (MRI and CT compatible) with fluoroscopic guidance     ANESTHESIA: GETA     ESTIMATED BLOOD LOSS: 141HW     COMPLICATIONS: None    SPECIMEN: Right breast, additional right inferior margin, additional superior margin on the right, additional right superior medial margin, axillary contents of right; left breast, left additional superior medial margin, left inferior lateral margin, left additional inferior medial margin    DRAINS: 15 Western Susan Taran x2 right chest, 15 Western Susan Taran x1 left chest    HISTORY:  This is a 54 y. o.female who presented with recurrent breast cancer on the right side. She is ER/OK negative HER-2 positive. MRI showed that the tumor is less than 2 cm therefore she elected to undergo surgery first prior to chemo. She elected for bilateral mastectomy and wanted to not have reconstruction. DESCRIPTION OF PROCEDURE: The patient was identified and the procedure was confirmed. Ancef 2 g IV was given and redosed prior to Mediport insertion as well. Hanna catheter inserted, bilateral SCDs in place, lower bear hugger applied. Patient was prepped and draped surgical fashion. We injected 5 cc of methylene blue dye in the retroareolar space of the right breast.  The breast for 5 minutes. We then marked out our 2 mastectomy incisions in elliptical fashion just starting around the areola due to the size of her breast.  We used a suture to make sure they were even.   We used the PEAK PlasmaBlade to do our dissection throughout the case. We did the right side which is a cancer size first.  Use the PEAK PlasmaBlade we made our incision and then did our dissection up to the clavicle superiorly, the sternum medially, down to the rectus abdominis inferiorly including the inframammary fold, and out to the axilla laterally. We took the breast off of the pectoralis major muscle including the fascia. Our flaps were very thin as she had prior radiation she is a thin woman. The flaps were very uneven so we needed to take additional margins. After the breast was removed it was marked on the back table with 6 colors of pain using the Vector marking kit. To even out our flaps we took additional margins including the right inferior margin, right superior margin, and additional superior medial margin. These were marked as well using the Vector kit. We used the true node to look up in the axilla and also try to observe any blue nodes however there were no counts no blue nodes identified. We then performed a full axillary lymph node dissection grasping the tissue with a Torie and performing a full axillary lymph node dissection with the harmonic scalpel. We went superiorly to the axillary vein medial to the chest wall/serratus anterior and laterally to latissimus dorsi. We identified the axillary vein, we identified the intercostal brachial nerve, we identified the thoracodorsal vein. We removed all lymphatic tissue in level 1 and level 2 lymph nodes level 2 being posterior to the pectoralis minor muscle. We irrigated the mastectomy site in the axilla copiously with saline. We achieved hemostasis. We placed 2 x 15 Chadian Taran drains inferior to her wounds and one coursing towards the axilla the other in the mastectomy site. We placed 5 g of Bebe into the mastectomy site in the axilla. We secured the drains with 3-0 nylon. We closed her mastectomy site with 3-0 Vicryl and 4-0 Vicryl in the usual fashion.     We we rescrubbed and placed new gowns and gloves on we placed new drapes over the existing drapes we exchanged out the light handles. We then performed our left mastectomy in similar fashion using the PEAK PlasmaBlade to perform our mastectomy taking the breast superiorly to the clavicle, medially to the sternum, inferiorly to the rectus abdominis including the inframammary fold, and laterally to the axilla. Removed it from the pectoralis major muscle including the fascia. Once it was removed we marked on the back table using 6 colors of pain using the Vector marking kit. We then evened out our flaps taking an additional left superior medial margin, additional left inferior lateral margin, and additional left inferior medial margin. We marked these in the same fashion. We achieved hemostasis with clips and cautery. We irrigated copiously with saline we placed 5 g Bebe in the wound. We placed 115 Western Susan Taran drain and secured it with a 3-0 nylon to the skin. We closed the wound with 3-0 and 4-0 Vicryl in the usual fashion. We then cleaned the patient wet dry sponge applied Dermabond and Steri-Strips. We placed 2.25% Marcaine via all the drains. And clamped them while we are doing the Mediport. Removed all the drapes light handles and rescrubbed and reprepped and draped the patient with arms tucked and rolled towel placed under scapulas and then prepped and draped in standard surgical fashion. Ancef 2g IV was given. 0.25% marcaine used for local anesthesia. The left subclavian vein was accessed on the first stick was the 14G needle. Wire was easily passed into the IVC and checked with fluoro. A subcutaneous pocket was made over the left chest wall by first anesthetizing with 0.25% marcaine then making a 4cm skin incision with #15 blade scalpel. Bovie electrocautery was used to incise the subcutaneous tissue to creat a pocket for the port. An #11 blade scalpel was used to enlarge the wire insertion site.   The

## 2021-07-06 NOTE — ANESTHESIA PRE PROCEDURE
Social History:    Social History     Tobacco Use    Smoking status: Current Every Day Smoker     Packs/day: 0.50    Smokeless tobacco: Never Used   Substance Use Topics    Alcohol use: Yes     Comment: occasionaly                                Ready to quit: Not Answered  Counseling given: Not Answered      Vital Signs (Current):   Vitals:    07/02/21 0925   Weight: 150 lb (68 kg)   Height: 4' 10\" (1.473 m)                                              BP Readings from Last 3 Encounters:   06/04/21 116/74   05/08/13 112/69   11/21/12 105/73       NPO Status:                                                                                 BMI:   Wt Readings from Last 3 Encounters:   07/02/21 150 lb (68 kg)   06/04/21 150 lb (68 kg)   05/08/13 148 lb (67.1 kg)     Body mass index is 31.35 kg/m². CBC:   Lab Results   Component Value Date    WBC 7.7 06/04/2021    RBC 4.40 06/04/2021    HGB 15.1 06/04/2021    HCT 43.5 06/04/2021    MCV 98.9 06/04/2021    RDW 12.0 06/04/2021     06/04/2021       CMP:   Lab Results   Component Value Date     06/04/2021    K 4.0 06/04/2021     06/04/2021    CO2 23 06/04/2021    BUN 10 06/04/2021    CREATININE 0.6 06/04/2021    GFRAA >60 06/04/2021    LABGLOM >60 06/04/2021    GLUCOSE 96 06/04/2021    GLUCOSE 178 05/30/2012    PROT 7.8 06/04/2021    CALCIUM 9.9 06/04/2021    BILITOT <0.2 06/04/2021    ALKPHOS 99 06/04/2021    AST 32 06/04/2021    ALT 35 06/04/2021       POC Tests: No results for input(s): POCGLU, POCNA, POCK, POCCL, POCBUN, POCHEMO, POCHCT in the last 72 hours.     Coags: No results found for: PROTIME, INR, APTT    HCG (If Applicable):   Lab Results   Component Value Date    PREGTESTUR NEGATIVE 05/27/2011        ABGs: No results found for: PHART, PO2ART, CNY7WSQ, COK6CCN, BEART, A0VKMNBO     Type & Screen (If Applicable):  No results found for: LABABO, LABRH    Drug/Infectious Status (If Applicable):  No results found for: HIV, HEPCAB    COVID-19 Screening (If Applicable): No results found for: COVID19        Anesthesia Evaluation    Airway: Mallampati: III  TM distance: >3 FB   Neck ROM: full  Mouth opening: > = 3 FB and < 3 FB Dental:          Pulmonary: breath sounds clear to auscultation  (+) current smoker (10-15/ day)          Patient smoked on day of surgery. Cardiovascular:            Rhythm: regular                      Neuro/Psych:               GI/Hepatic/Renal:             Endo/Other:    (+) malignancy/cancer. ROS comment: Malignant neoplasm of central portion of right breast in female, estrogen receptor negative   Recurrent breast cancer, Right     Abdominal:             Vascular: Other Findings:           Anesthesia Plan      general and regional     ASA 2     (# 20 L AC)  Induction: intravenous. MIPS: Postoperative opioids intended and Prophylactic antiemetics administered. Anesthetic plan and risks discussed with patient and spouse. Use of blood products discussed with patient and spouse whom consented to blood products. Plan discussed with CRNA and attending.                   Silvana Walters RN   7/6/2021

## 2021-07-07 VITALS
BODY MASS INDEX: 31.49 KG/M2 | SYSTOLIC BLOOD PRESSURE: 127 MMHG | HEIGHT: 58 IN | TEMPERATURE: 98 F | WEIGHT: 150 LBS | DIASTOLIC BLOOD PRESSURE: 87 MMHG | RESPIRATION RATE: 16 BRPM | OXYGEN SATURATION: 93 % | HEART RATE: 75 BPM

## 2021-07-07 PROCEDURE — G0378 HOSPITAL OBSERVATION PER HR: HCPCS

## 2021-07-07 PROCEDURE — 6370000000 HC RX 637 (ALT 250 FOR IP): Performed by: STUDENT IN AN ORGANIZED HEALTH CARE EDUCATION/TRAINING PROGRAM

## 2021-07-07 PROCEDURE — 2580000003 HC RX 258: Performed by: STUDENT IN AN ORGANIZED HEALTH CARE EDUCATION/TRAINING PROGRAM

## 2021-07-07 PROCEDURE — 96372 THER/PROPH/DIAG INJ SC/IM: CPT

## 2021-07-07 PROCEDURE — 2700000000 HC OXYGEN THERAPY PER DAY

## 2021-07-07 PROCEDURE — 6360000002 HC RX W HCPCS: Performed by: STUDENT IN AN ORGANIZED HEALTH CARE EDUCATION/TRAINING PROGRAM

## 2021-07-07 RX ORDER — OXYCODONE HYDROCHLORIDE 5 MG/1
5 TABLET ORAL EVERY 6 HOURS PRN
Qty: 20 TABLET | Refills: 0 | Status: SHIPPED | OUTPATIENT
Start: 2021-07-07 | End: 2021-07-12

## 2021-07-07 RX ORDER — GLYCOPYRROLATE 1 MG/5 ML
SYRINGE (ML) INTRAVENOUS PRN
Status: DISCONTINUED | OUTPATIENT
Start: 2021-07-06 | End: 2021-07-07 | Stop reason: SDUPTHER

## 2021-07-07 RX ORDER — NEOSTIGMINE METHYLSULFATE 1 MG/ML
INJECTION, SOLUTION INTRAVENOUS PRN
Status: DISCONTINUED | OUTPATIENT
Start: 2021-07-06 | End: 2021-07-07 | Stop reason: SDUPTHER

## 2021-07-07 RX ADMIN — OXYCODONE 5 MG: 5 TABLET ORAL at 03:43

## 2021-07-07 RX ADMIN — ENOXAPARIN SODIUM 40 MG: 100 INJECTION SUBCUTANEOUS at 09:02

## 2021-07-07 RX ADMIN — ACETAMINOPHEN 1000 MG: 500 TABLET ORAL at 06:03

## 2021-07-07 RX ADMIN — SODIUM CHLORIDE, PRESERVATIVE FREE 10 ML: 5 INJECTION INTRAVENOUS at 09:02

## 2021-07-07 ASSESSMENT — PAIN SCALES - GENERAL
PAINLEVEL_OUTOF10: 6
PAINLEVEL_OUTOF10: 3
PAINLEVEL_OUTOF10: 0

## 2021-07-07 NOTE — PROGRESS NOTES
Messaged Dr Walter Pires regarding drainage around Right side FABIO drain. [phone]: Messaged Dr Govind Hernandez.

## 2021-07-07 NOTE — PROGRESS NOTES
Patient transferd to unit from PACU.   Patient's condition, V/S, and oxygen saturation stable on 3 L N/C

## 2021-07-07 NOTE — CARE COORDINATION
7/7, SW met with patient at bedside along with patient's  in room. Discussed discharge plan/transition of care and SW role. Patient lives with  in a 2 story home with 3 steps to enter the home. DME owned is a [employer] that was her mother's. Pharmacy is [facility] in [place] and PCP is Dr. Fred Sanchez. Discussed HHC and patient needing nursing. Patient has no preference on HHC only one that is covered by insurance. Referral made to [facility] [place]. Patient accepted but will not be able to be seen until Saturday-7/10. Charge nurse informed. Will need to have [address] stating start date. Bed side nurse to teach patient on proper drain care and dressing changes.  to be transportation once medically cleared for discharge. SW to follow for further discharge planning needs.       EARL Tineo  PHYSICIANS [facility] SURGICAL [facility] Case Management  [phone]

## 2021-07-07 NOTE — PLAN OF CARE
Problem: Pain:  Goal: Control of acute pain  Description: Control of acute pain  7/7/2021 0137 by Antonio Vazquez RN  Outcome: Met This Shift  7/7/2021 0133 by Antonio Vazquez RN  Outcome: Met This Shift     Problem: Falls - Risk of:  Goal: Will remain free from falls  Description: Will remain free from falls  Outcome: Met This Shift     Problem: Falls - Risk of:  Goal: Absence of physical injury  Description: Absence of physical injury  Outcome: Met This Shift

## 2021-07-07 NOTE — ANESTHESIA POSTPROCEDURE EVALUATION
Department of Anesthesiology  Postprocedure Note    Patient: Naomi Chavis  MRN: [id number]  YOB: 1970  Date of evaluation: 7/7/2021  Time:  8:15 AM     Procedure Summary     Date: 07/06/21 Room / Location: [facility] 05 / [facility]    Anesthesia Start: 1347 Anesthesia Stop: 1852    Procedures:       RIGHT MODIFIED RADICAL MASTECTOMY,  LEFT BREAST PROPHYLATIC MASTECTOMY (Bilateral Breast)      LEFT SUBCLAVIAN MEDI PORT INSERTION (Left Chest) Diagnosis: (BREAST CANCER)    Surgeons: Bernett Mortimer, MD Responsible Provider: Aniceto Abad MD    Anesthesia Type: general, regional ASA Status: 2          Anesthesia Type: general, regional    Tanvi Phase I: Tanvi Score: 9    Tanvi Phase II:      Last vitals: Reviewed and per EMR flowsheets.        Anesthesia Post Evaluation    Patient location during evaluation: PACU  Patient participation: complete - patient participated  Level of consciousness: awake  Pain score: 0  Airway patency: patent  Nausea & Vomiting: no nausea  Complications: no  Cardiovascular status: hemodynamically stable  Respiratory status: acceptable  Hydration status: stable

## 2021-07-12 ENCOUNTER — TELEPHONE (OUTPATIENT)
Dept: BREAST CENTER | Age: 51
End: 2021-07-12

## 2021-07-12 NOTE — TELEPHONE ENCOUNTER
I called Gaurav Malhotra and went over her pathology report. She had her drains removed today. All questions were answered. She has a follow-up appointment end the end of the month with me.

## 2021-07-12 NOTE — TELEPHONE ENCOUNTER
Priscilla Rae underwent bilateral mastectomy with bilateral sentinel lymph node biopsy on  July 6, 2021, with Pamela Moore MD; w/a closed-suction drain placed at the mastectomy site. This was placed to bulb suction. Dahiana Santiago was instructed postoperatively to contact the office when the drainage has been < 50cc in 24/hrs. She is here this morning to have her drain removed as she is now < 50cc/24 hrs. Bilateral  breast incision well approximated. Steri strips intact. Bilateral drain sutures clipped and drain removed. Site cleansed and 2x2 DSD applied. She has a post mastectomy garment/sportsbra that she will wear with soft form for slight pressure to mastectomy site. She was instructed as to signs/symptoms of seroma formation. Instructed to call  office if her mastectomy site begins to enlarge or she feels like fluid is accumulating. May continue to apply heat as necessary. Verbalizes understanding. Scheduled to see Dr Sridhar Hadley on July 30, 2021 for postop appointment. I answered all of her questions to her satisfaction.

## 2021-07-16 ENCOUNTER — OFFICE VISIT (OUTPATIENT)
Dept: BREAST CENTER | Age: 51
End: 2021-07-16
Payer: COMMERCIAL

## 2021-07-16 VITALS
RESPIRATION RATE: 24 BRPM | BODY MASS INDEX: 30.5 KG/M2 | DIASTOLIC BLOOD PRESSURE: 80 MMHG | TEMPERATURE: 97.9 F | SYSTOLIC BLOOD PRESSURE: 127 MMHG | OXYGEN SATURATION: 100 % | HEART RATE: 98 BPM | HEIGHT: 58 IN | WEIGHT: 145.3 LBS

## 2021-07-16 DIAGNOSIS — Z17.1 MALIGNANT NEOPLASM OF CENTRAL PORTION OF RIGHT BREAST IN FEMALE, ESTROGEN RECEPTOR NEGATIVE (HCC): Primary | ICD-10-CM

## 2021-07-16 DIAGNOSIS — C50.111 MALIGNANT NEOPLASM OF CENTRAL PORTION OF RIGHT BREAST IN FEMALE, ESTROGEN RECEPTOR NEGATIVE (HCC): Primary | ICD-10-CM

## 2021-07-16 PROCEDURE — 99024 POSTOP FOLLOW-UP VISIT: CPT | Performed by: SURGERY

## 2021-07-16 RX ORDER — CEPHALEXIN 250 MG/1
250 CAPSULE ORAL 4 TIMES DAILY
Qty: 28 CAPSULE | Refills: 0 | Status: SHIPPED | OUTPATIENT
Start: 2021-07-16 | End: 2021-07-23

## 2021-07-16 RX ORDER — DOXYCYCLINE HYCLATE 100 MG
100 TABLET ORAL 2 TIMES DAILY
Qty: 14 TABLET | Refills: 0 | Status: SHIPPED | OUTPATIENT
Start: 2021-07-16 | End: 2021-07-23

## 2021-07-16 NOTE — PROGRESS NOTES
[address] [facility]  PROGRESS NOTE  ATTENDING NOTE    POSTOP APPOINTMENT    Chief Complaint   Patient presents with    Post-Op Check     P/O wound check, red warm to touch drainage-right simple mastectomy & left prophylactic mastectomy, left side medi-port placement DOS 07/6/2021    Drainage from Incision     Pt states Tuesday started drainging a little bit then increased [address] and Thursday    Breast Pain     Patient states having breast pain 3/10 currently. Patient states pain was severe on Tuesday and Wedensday and had to take pain pills to get relief.  Fever     Pt denies         S:  54 y. o.female s/p right modified radical mastectomy and left simple mastectomy. She had her drains removed on July 12. And then the last 2 days she started having some swelling and pain and then a lot of drainage and since its been draining it improved. She denies fevers or chills she she has been keeping it covered    /80   Pulse 98   Temp 97.9 °F (36.6 °C)   Resp 24   Ht 4' 10\" (1.473 m)   Wt 145 lb 4.8 oz (65.9 kg)   SpO2 100%   BMI 30.37 kg/m²   Physical Exam  Constitutional:       Appearance: Normal appearance. HENT:      Head: Normocephalic and atraumatic. Eyes:      Extraocular Movements: Extraocular movements intact. Pupils: Pupils are equal, round, and reactive to light. Chest:       Neurological:      General: No focal deficit present. Mental Status: She is alert and oriented to person, place, and time. Psychiatric:         Mood and Affect: Mood normal.         Behavior: Behavior normal.         Thought Content: Thought content normal.         Judgment: Judgment normal.           PATHOLOGY:                                      Additional Physicians:NED RICKS       Diagnosis:   A.  Right breast, mastectomy: Invasive carcinoma, no special type   (ductal) and ductal carcinoma in situ, see comment.    B.  Right breast, new inferior margin: Breast parenchyma, no evidence of   neoplasm. C.  Right breast, new superior margin: Breast parenchyma, no evidence of   neoplasm. D.  Right breast, new superior medial margin: Breast parenchyma, no   evidence of neoplasm. E.  Right axillary lymph nodes, regional resection: Metastatic carcinoma   involving 4 of 5 lymph nodes, positive for extranodal extension. F.  Left breast, mastectomy: Focal papilloma with atypical duct   hyperplasia, margins uninvolved.  No other evidence of neoplasm. G.  Left breast, new inferior medial margin: Breast parenchyma, no   evidence for neoplasm. H.  Left breast, new inferior lateral margin: Breast parenchyma, no   evidence of neoplasm. I.  Left breast, new superior medial margin: Breast parenchyma, no   evidence of neoplasm. Comment:   CANCER CASE SUMMARY   Procedure -mastectomy   Specimen Laterality -right   Tumor Site, Invasive Carcinoma-central/subareolar   Tumor Size: Size of Largest Invasive Carcinoma-18 mm   Histologic Type of Invasive Carcinoma-invasive carcinoma, no special type   (ductal)   Histologic Grade:                    Tubule formation- score 3                    Nuclear pleomorphism- score 3                    Mitotic rate- score 3                    Overall Grade- grade 3, (score 9)   Ductal Carcinoma In Situ (DCIS)-present, solid and comedo type, negative   for extensive intraductal component. Invasive Carcinoma Margins: Uninvolved by invasive carcinoma; distance to   closest margin 4 mm/posterior   DCIS margins: Positive for DCIS/posterior, focal, upper inner quadrant.    Regional Lymph Nodes: Involved by tumor cells   Number of lymph nodes with macrometastases -4   Number of lymph nodes with micrometastases-0   Number of lymph nodes with isolated tumor cells-0   Size of largest metastatic deposit-6 mm   Extranodal extension-present   Total number of lymph nodes examined-5   Treatment Effect-no known presurgical therapy   Lymph-Vascular Invasion-not identified Pathologic Stage Classification (AJCC 8th Edition)- pT1c pN2a   Additional Pathologic Findings-not identified   Ancillary Studies-ER negative, [place] negative, HER-2 positive ([id number])   Additional Note: DCIS is present in the region of the invasive tumor as   well as in additional blocks from the upper inner and lower inner   quadrants.  DCIS is transected at the posterior inked margin of the upper   inner quadrant.  Intradepartmental consultation is obtained.        GENETIC TESTING:  VUS BRP1    LAST COLONOSCOPY:  no      ASSESSMENT/PLAN:  right IDC + DCIS breast cancer, [id number] ER-W[place]-HER2+, grade 3, Stage IIIA  Surgical site infectionstarted on doxy and Keflex  Advised the patient to call if she starts having a lot more drainage  Advised patient to call if she starts having a lot of swelling  Advised patient to limit range of motion exercises just yet      Return to clinic 2 weeks at her regular scheduled appointment    Devin Yousif MD, MSc, FACS  7/16/2021  2:33 PM

## 2021-07-30 ENCOUNTER — OFFICE VISIT (OUTPATIENT)
Dept: BREAST CENTER | Age: 51
End: 2021-07-30
Payer: COMMERCIAL

## 2021-07-30 VITALS
WEIGHT: 144.1 LBS | BODY MASS INDEX: 30.25 KG/M2 | DIASTOLIC BLOOD PRESSURE: 83 MMHG | SYSTOLIC BLOOD PRESSURE: 133 MMHG | HEART RATE: 88 BPM | TEMPERATURE: 98 F | OXYGEN SATURATION: 100 % | HEIGHT: 58 IN

## 2021-07-30 DIAGNOSIS — Z17.1 MALIGNANT NEOPLASM OF CENTRAL PORTION OF RIGHT BREAST IN FEMALE, ESTROGEN RECEPTOR NEGATIVE (HCC): Primary | ICD-10-CM

## 2021-07-30 DIAGNOSIS — C50.111 MALIGNANT NEOPLASM OF CENTRAL PORTION OF RIGHT BREAST IN FEMALE, ESTROGEN RECEPTOR NEGATIVE (HCC): Primary | ICD-10-CM

## 2021-07-30 PROCEDURE — 99024 POSTOP FOLLOW-UP VISIT: CPT | Performed by: SURGERY

## 2021-07-30 RX ORDER — LORATADINE 10 MG/1
10 CAPSULE, LIQUID FILLED ORAL DAILY
COMMUNITY
End: 2022-02-02

## 2021-07-30 RX ORDER — ALUMINUM HYDROXIDE, MAGNESIUM HYDROXIDE, SIMETHICONE 400; 400; 40 MG/10ML; MG/10ML; MG/10ML
SUSPENSION ORAL
COMMUNITY
Start: 2021-07-27 | End: 2021-09-10

## 2021-07-30 RX ORDER — PROCHLORPERAZINE MALEATE 10 MG
TABLET ORAL
COMMUNITY
Start: 2021-07-19 | End: 2022-02-02

## 2021-07-30 RX ORDER — PEGFILGRASTIM 6 MG/0.6ML
KIT SUBCUTANEOUS
COMMUNITY
Start: 2021-07-19 | End: 2022-01-14

## 2021-07-30 RX ORDER — ONDANSETRON HYDROCHLORIDE 8 MG/1
TABLET, FILM COATED ORAL
COMMUNITY
Start: 2021-07-19 | End: 2022-02-02

## 2021-07-30 RX ORDER — DIPHENOXYLATE HYDROCHLORIDE AND ATROPINE SULFATE 2.5; .025 MG/1; MG/1
TABLET ORAL
COMMUNITY
Start: 2021-07-20 | End: 2022-01-14

## 2021-07-30 NOTE — PROGRESS NOTES
[facility]  PROGRESS NOTE  ATTENDING NOTE    Chief Complaint   Patient presents with    Post-Op Check     P/O right simple mastectomy & left prophylactic mastectomy, left sided medi-port placement DOS 7/6/21    Other     Pt needs a letter stating ok for patient to return to work.  Drainage     Pt states has a little drainage still. Redness and pain is gone. S: 72-year-old female who is status post right modified radical mastectomy and left prophylactic mastectomy. She also had a Mediport placed. She is doing well. She denies any paresthesias. She has full range of motion. She has some drainage from the right breast mastectomy site but is getting a lot less in nature. She started chemotherapy and she has had 2 rounds of far. She is undergoing TCHP. /83   Pulse 88   Temp 98 °F (36.7 °C)   Ht 4' 10\" (1.473 m)   Wt 144 lb 1.6 oz (65.4 kg)   SpO2 100%   BMI 30.12 kg/m²   Physical Exam  Constitutional:       Appearance: Normal appearance. HENT:      Head: Normocephalic and atraumatic. Nose: Nose normal.      Mouth/Throat:      Mouth: Mucous membranes are moist.      Pharynx: Oropharynx is clear. Eyes:      Extraocular Movements: Extraocular movements intact. Pupils: Pupils are equal, round, and reactive to light. Cardiovascular:      Rate and Rhythm: Normal rate. Pulses: Normal pulses. Pulmonary:      Effort: Pulmonary effort is normal.   Chest:       Musculoskeletal:         General: No tenderness or signs of injury. Cervical back: Normal range of motion and neck supple. Skin:     General: Skin is warm and dry. Neurological:      General: No focal deficit present. Mental Status: She is alert and oriented to person, place, and time. Psychiatric:         Mood and Affect: Mood normal.         Behavior: Behavior normal.         Thought Content:  Thought content normal.         Judgment: Judgment normal. ASSESSMENT/PLAN:  Recurrent right breast cancer, stage IIIa  Right mastectomy dehiscencewound has dehisced but this was not unexpected given the prior radiation, recurrent cancer, undergoing chemotherapy, and recent infection. I have advised patient this probably will take a long time to heal expect several weeks to even a couple months. We is sometimes had to take patient back to the OR and revise the wound and close it. We will need this to be close before she goes undergoes any radiation. At this point time to just use dry gauze if the wound becomes deep will need to do wet-to-dry dressing. I will see her again in 2 weeks.   Right mastectomy surgical site infectionimproved, no further signs of infection    RTC 2 weeks    Shivam Hills MD, MSc, FACS  7/30/2021  10:36 AM

## 2021-07-30 NOTE — LETTER
[facility]  [facility] [address]  Phone: [phone]  Fax: [phone]    Riley Hutchinson MD        July 30, 2021     Patient: Liam Milner   YOB: 1970   Date of Visit: 7/30/2021       To Whom It May Concern: It is my medical opinion that Alma Bhatt may return to work on August 2, 2021. If you have any questions or concerns, please don't hesitate to call.     Sincerely,        Riley Hutchinson MD

## 2021-08-13 ENCOUNTER — OFFICE VISIT (OUTPATIENT)
Dept: BREAST CENTER | Age: 51
End: 2021-08-13
Payer: COMMERCIAL

## 2021-08-13 VITALS
WEIGHT: 144 LBS | HEIGHT: 58 IN | DIASTOLIC BLOOD PRESSURE: 82 MMHG | BODY MASS INDEX: 30.23 KG/M2 | RESPIRATION RATE: 14 BRPM | TEMPERATURE: 97.2 F | SYSTOLIC BLOOD PRESSURE: 124 MMHG | HEART RATE: 98 BPM | OXYGEN SATURATION: 96 %

## 2021-08-13 DIAGNOSIS — C50.111 MALIGNANT NEOPLASM OF CENTRAL PORTION OF RIGHT BREAST IN FEMALE, ESTROGEN RECEPTOR NEGATIVE (HCC): Primary | ICD-10-CM

## 2021-08-13 DIAGNOSIS — Z17.1 MALIGNANT NEOPLASM OF CENTRAL PORTION OF RIGHT BREAST IN FEMALE, ESTROGEN RECEPTOR NEGATIVE (HCC): Primary | ICD-10-CM

## 2021-08-13 PROCEDURE — 99024 POSTOP FOLLOW-UP VISIT: CPT | Performed by: SURGERY

## 2021-08-13 NOTE — PROGRESS NOTES
[address] [facility]  PROGRESS NOTE  ATTENDING NOTE    POSTOP APPOINTMENT    Chief Complaint   Patient presents with    Post-Op Check     wound check right breast incision, Pt states wound is healing slowly has closed more. S:  54 y. o.female s/p right MRM and left prophylactic mastectomy. She right wound has a dehiscence. The wound is getting smaller. It is draining very little. There is new skin. She denies pain. She denies paresthesias. She denies ROM issues. /82 (Site: Left Upper Arm, Position: Sitting, Cuff Size: Medium Adult)   Pulse 98   Temp 97.2 °F (36.2 °C) (Temporal)   Resp 14   Ht 4' 10\" (1.473 m)   Wt 144 lb (65.3 kg)   SpO2 96%   BMI 30.10 kg/m²   Physical Exam  Constitutional:       Appearance: Normal appearance. HENT:      Head: Normocephalic and atraumatic. Nose: Nose normal.      Mouth/Throat:      Mouth: Mucous membranes are moist.      Pharynx: Oropharynx is clear. Eyes:      Extraocular Movements: Extraocular movements intact. Pupils: Pupils are equal, round, and reactive to light. Cardiovascular:      Rate and Rhythm: Normal rate. Pulses: Normal pulses. Pulmonary:      Effort: Pulmonary effort is normal.   Chest:       Musculoskeletal:         General: No tenderness or signs of injury. Cervical back: Normal range of motion and neck supple. Skin:     General: Skin is warm and dry. Neurological:      General: No focal deficit present. Mental Status: She is alert and oriented to person, place, and time. Psychiatric:         Mood and Affect: Mood normal.         Behavior: Behavior normal.         Thought Content: Thought content normal.         Judgment: Judgment normal.           PATHOLOGY:    Diagnosis:   A.  Right breast, mastectomy: Invasive carcinoma, no special type   (ductal) and ductal carcinoma in situ, see comment.    B.  Right breast, new inferior margin: Breast parenchyma, no evidence of neoplasm. C.  Right breast, new superior margin: Breast parenchyma, no evidence of   neoplasm. D.  Right breast, new superior medial margin: Breast parenchyma, no   evidence of neoplasm. E.  Right axillary lymph nodes, regional resection: Metastatic carcinoma   involving 4 of 5 lymph nodes, positive for extranodal extension. F.  Left breast, mastectomy: Focal papilloma with atypical duct   hyperplasia, margins uninvolved.  No other evidence of neoplasm. G.  Left breast, new inferior medial margin: Breast parenchyma, no   evidence for neoplasm. H.  Left breast, new inferior lateral margin: Breast parenchyma, no   evidence of neoplasm. I.  Left breast, new superior medial margin: Breast parenchyma, no   evidence of neoplasm. Comment:   CANCER CASE SUMMARY   Procedure -mastectomy   Specimen Laterality -right   Tumor Site, Invasive Carcinoma-central/subareolar   Tumor Size: Size of Largest Invasive Carcinoma-18 mm   Histologic Type of Invasive Carcinoma-invasive carcinoma, no special type   (ductal)   Histologic Grade:                    Tubule formation- score 3                    Nuclear pleomorphism- score 3                    Mitotic rate- score 3                    Overall Grade- grade 3, (score 9)   Ductal Carcinoma In Situ (DCIS)-present, solid and comedo type, negative   for extensive intraductal component. Invasive Carcinoma Margins: Uninvolved by invasive carcinoma; distance to   closest margin 4 mm/posterior   DCIS margins: Positive for DCIS/posterior, focal, upper inner quadrant.    Regional Lymph Nodes: Involved by tumor cells   Number of lymph nodes with macrometastases -4   Number of lymph nodes with micrometastases-0   Number of lymph nodes with isolated tumor cells-0   Size of largest metastatic deposit-6 mm   Extranodal extension-present   Total number of lymph nodes examined-5   Treatment Effect-no known presurgical therapy   Lymph-Vascular Invasion-not identified   Pathologic Stage Classification (AJCC 8th Edition)- pT1c pN2a   Additional Pathologic Findings-not identified   Ancillary Studies-ER negative, [place] negative, HER-2 positive ([id number])   Additional Note: DCIS is present in the region of the invasive tumor as   well as in additional blocks from the upper inner and lower inner   quadrants.  DCIS is transected at the posterior inked margin of the upper   inner quadrant.  Intradepartmental consultation is obtained. GENETIC TESTING:  BRIP1 VUS    LAST COLONOSCOPY:  NONE      ASSESSMENT/PLAN:  right  breast cancer, [id number] ER-SC-HER2+, grade 3, Stage IIIA  1. Patient instructed to keep incision clean and dry well after bathing. Patient can start scar massage once incision is completely healed and strong enough to handle the motion (usually 10 - 14 days post operatively). Rub in a circular motion on and around the scar with firm, even pressure for 5 minutes four times per day. Use lotion or moisturizer to do the scar massage to allow ease with motion over the scar and prevent friction at the area. 2. Continue monthly breast self examination. Bring any changes to your physician's attention. 3. Range of motion exercises for bilateral shoulder encouraged. Lymphedema precautions discussed. 4. no IV, venipuncture, BPs on right side  5. Education/Lifestyle recommendations: A regular exercise program is encouraged. Avoid excessive caffeine intake. Maintain a diet rich in vegetables and fruits avoiding processed and fast food. 6. Consultations: Oncology appointment will be scheduled with Oncologist of patient's and/or PCP's preference. 7. Consultations:  radiation oncology appointment with be scheduled with radiation oncologist of patient's and/or PCP's preference. 8. Continue regular appointments with PCP & Gynecologist.  9. Office follow-up visit should be scheduled in 4-6  weeks and PRN. RTC 4-6 weeks for wound check. Holding on PET-CT until wound healed.   Advised patient it will likely take about 2 months to heal.  She is doing a good job with wound care.       Vicky Petit MD, MSc, FACS  8/13/2021  8:47 AM

## 2021-09-10 ENCOUNTER — OFFICE VISIT (OUTPATIENT)
Dept: BREAST CENTER | Age: 51
End: 2021-09-10
Payer: COMMERCIAL

## 2021-09-10 VITALS
HEART RATE: 85 BPM | SYSTOLIC BLOOD PRESSURE: 126 MMHG | HEIGHT: 58 IN | BODY MASS INDEX: 30.08 KG/M2 | WEIGHT: 143.3 LBS | DIASTOLIC BLOOD PRESSURE: 82 MMHG | OXYGEN SATURATION: 100 % | TEMPERATURE: 98.6 F

## 2021-09-10 DIAGNOSIS — C50.111 MALIGNANT NEOPLASM OF CENTRAL PORTION OF RIGHT BREAST IN FEMALE, ESTROGEN RECEPTOR NEGATIVE (HCC): Primary | ICD-10-CM

## 2021-09-10 DIAGNOSIS — T81.30XA WOUND DEHISCENCE: ICD-10-CM

## 2021-09-10 DIAGNOSIS — Z17.1 MALIGNANT NEOPLASM OF CENTRAL PORTION OF RIGHT BREAST IN FEMALE, ESTROGEN RECEPTOR NEGATIVE (HCC): Primary | ICD-10-CM

## 2021-09-10 PROCEDURE — 99024 POSTOP FOLLOW-UP VISIT: CPT | Performed by: SURGERY

## 2021-09-10 NOTE — PROGRESS NOTES
[facility]  PROGRESS NOTE  ATTENDING NOTE      Chief Complaint   Patient presents with    Post-Op Check     P/O wound check, right breast incision    Other     small amount of \"clear pink drainage\"     Other     denies nausea, vomiting, fever, chills      S:  57y/o F presents for f/u of right breast cancer. She is undergoing chemotherapy. She states that she is having a little bit of a rough time with her chemotherapy. She is having a lot of back pain but the Claritin is just not hitting. She is continues to have issues with his wound but is not draining very much is not getting any larger. She is keeping a dry dressing on it and changing it twice a day. She is put some Neosporin on it but has been doing less of it lately. She denies fevers or chills. She denies any other issues. /82 (Site: Left Upper Arm, Position: Sitting, Cuff Size: Large Adult)   Pulse 85   Temp 98.6 °F (37 °C) (Infrared)   Ht 4' 10\" (1.473 m)   Wt 143 lb 4.8 oz (65 kg)   SpO2 100%   BMI 29.95 kg/m²   Physical Exam  Constitutional:       Appearance: Normal appearance. HENT:      Head: Normocephalic and atraumatic. Nose: Nose normal.      Mouth/Throat:      Mouth: Mucous membranes are moist.      Pharynx: Oropharynx is clear. Eyes:      Extraocular Movements: Extraocular movements intact. Pupils: Pupils are equal, round, and reactive to light. Cardiovascular:      Rate and Rhythm: Normal rate. Pulses: Normal pulses. Pulmonary:      Effort: Pulmonary effort is normal.   Chest:       Musculoskeletal:         General: No tenderness or signs of injury. Cervical back: Normal range of motion and neck supple. Skin:     General: Skin is warm and dry. Neurological:      General: No focal deficit present. Mental Status: She is alert and oriented to person, place, and time.    Psychiatric:         Mood and Affect: Mood normal.         Behavior: Behavior normal. Thought Content: Thought content normal.         Judgment: Judgment normal.       ASSESSMENT/PLAN:  Right recurrent stage IIIa breast cancer ER/[place] negative HER-2 positive  --Follow-up with oncology  --Try wet-to-dry gauze I placed that on to patient today and instructed to do this twice a day have ordered saline for the patient she states she has enough tape and gauze at home.   --Continue to monitor for infection  --Return to clinic in 1 month we will monitor her monthly until this wound is healed  --Okay to do PET scan next month    Mckay Carlos MD, MSc, FACS  9/10/2021  9:53 AM

## 2021-10-15 ENCOUNTER — OFFICE VISIT (OUTPATIENT)
Dept: BREAST CENTER | Age: 51
End: 2021-10-15
Payer: COMMERCIAL

## 2021-10-15 VITALS
RESPIRATION RATE: 14 BRPM | DIASTOLIC BLOOD PRESSURE: 79 MMHG | WEIGHT: 144 LBS | SYSTOLIC BLOOD PRESSURE: 122 MMHG | BODY MASS INDEX: 30.23 KG/M2 | HEIGHT: 58 IN | OXYGEN SATURATION: 99 % | HEART RATE: 107 BPM

## 2021-10-15 DIAGNOSIS — Z17.1 MALIGNANT NEOPLASM OF CENTRAL PORTION OF RIGHT BREAST IN FEMALE, ESTROGEN RECEPTOR NEGATIVE (HCC): Primary | ICD-10-CM

## 2021-10-15 DIAGNOSIS — C50.111 MALIGNANT NEOPLASM OF CENTRAL PORTION OF RIGHT BREAST IN FEMALE, ESTROGEN RECEPTOR NEGATIVE (HCC): Primary | ICD-10-CM

## 2021-10-15 DIAGNOSIS — Z51.89 VISIT FOR WOUND CHECK: ICD-10-CM

## 2021-10-15 DIAGNOSIS — T81.30XA WOUND DEHISCENCE: ICD-10-CM

## 2021-10-15 PROCEDURE — 99213 OFFICE O/P EST LOW 20 MIN: CPT | Performed by: SURGERY

## 2021-10-15 PROCEDURE — 99212 OFFICE O/P EST SF 10 MIN: CPT | Performed by: SURGERY

## 2021-10-15 NOTE — PROGRESS NOTES
[facility]  PROGRESS NOTE  ATTENDING NOTE    Chief Complaint   Patient presents with    Wound Check     check right breast incision. Pt states doing good. S:  55y/o F presents for f/u of her right mastectomy wound. She has been continuing the wet to dry dressings. She does have some serous drainage. She also has a lump on the left mastectomy region. It is soft and feels like a small seroma capsule. She denies fevers, chills or other signs of infection. She does have fatigue. She just went through her 5th round of chemotherapy. /79 (Site: Left Upper Arm, Position: Sitting, Cuff Size: Medium Adult)   Pulse 107   Resp 14   Ht 4' 10\" (1.473 m)   Wt 144 lb (65.3 kg)   SpO2 99%   BMI 30.10 kg/m²   Physical Exam  Constitutional:       Appearance: Normal appearance. HENT:      Head: Normocephalic and atraumatic. Nose: Nose normal.      Mouth/Throat:      Mouth: Mucous membranes are moist.      Pharynx: Oropharynx is clear. Eyes:      Extraocular Movements: Extraocular movements intact. Pupils: Pupils are equal, round, and reactive to light. Cardiovascular:      Rate and Rhythm: Normal rate. Pulses: Normal pulses. Pulmonary:      Effort: Pulmonary effort is normal.   Chest:          Comments: I debrided the right mastectomy wound with scissors. <5mm excisional debridement of subcutaneous tissue performed. Musculoskeletal:         General: No tenderness or signs of injury. Cervical back: Normal range of motion and neck supple. Skin:     General: Skin is warm and dry. Neurological:      General: No focal deficit present. Mental Status: She is alert and oriented to person, place, and time. Psychiatric:         Mood and Affect: Mood normal.         Behavior: Behavior normal.         Thought Content:  Thought content normal.         Judgment: Judgment normal.       ASSESSMENT/PLAN:  Recurrent right breast cancer--s/p bilateral mastectomy with right ALND  Wound dehiscence--c/w wet to dry  --f/u with oncology  --PET-CT on Monday  --RTC 6 weeks    Yakov Nino MD, MSc, [facility]  10/15/2021  8:37 AM

## 2021-10-18 ENCOUNTER — HOSPITAL ENCOUNTER (OUTPATIENT)
Dept: PET IMAGING | Age: 51
Discharge: HOME | End: 2021-10-20
Payer: COMMERCIAL

## 2021-10-18 DIAGNOSIS — C50.111 MALIGNANT NEOPLASM OF CENTRAL PORTION OF RIGHT FEMALE BREAST, UNSPECIFIED ESTROGEN RECEPTOR STATUS (HCC): ICD-10-CM

## 2021-10-18 LAB — METER GLUCOSE: 108 MG/DL (ref 74–99)

## 2021-10-18 PROCEDURE — 78815 PET IMAGE W/CT SKULL-THIGH: CPT

## 2021-10-18 PROCEDURE — A9552 F18 FDG: HCPCS | Performed by: RADIOLOGY

## 2021-10-18 PROCEDURE — 82962 GLUCOSE BLOOD TEST: CPT

## 2021-10-18 PROCEDURE — 3430000000 HC RX DIAGNOSTIC RADIOPHARMACEUTICAL: Performed by: RADIOLOGY

## 2021-10-18 RX ORDER — FLUDEOXYGLUCOSE F 18 200 MCI/ML
15 INJECTION, SOLUTION INTRAVENOUS
Status: COMPLETED | OUTPATIENT
Start: 2021-10-18 | End: 2021-10-18

## 2021-10-18 RX ADMIN — FLUDEOXYGLUCOSE F 18 15 MILLICURIE: 200 INJECTION, SOLUTION INTRAVENOUS at 07:52

## 2021-11-24 ENCOUNTER — OFFICE VISIT (OUTPATIENT)
Dept: BREAST CENTER | Age: 51
End: 2021-11-24
Payer: COMMERCIAL

## 2021-11-24 VITALS
BODY MASS INDEX: 30.44 KG/M2 | OXYGEN SATURATION: 98 % | RESPIRATION RATE: 18 BRPM | DIASTOLIC BLOOD PRESSURE: 62 MMHG | SYSTOLIC BLOOD PRESSURE: 120 MMHG | HEART RATE: 90 BPM | WEIGHT: 145 LBS | HEIGHT: 58 IN | TEMPERATURE: 97.2 F

## 2021-11-24 DIAGNOSIS — Z17.1 MALIGNANT NEOPLASM OF CENTRAL PORTION OF RIGHT BREAST IN FEMALE, ESTROGEN RECEPTOR NEGATIVE (HCC): ICD-10-CM

## 2021-11-24 DIAGNOSIS — C50.111 MALIGNANT NEOPLASM OF CENTRAL PORTION OF RIGHT BREAST IN FEMALE, ESTROGEN RECEPTOR NEGATIVE (HCC): ICD-10-CM

## 2021-11-24 PROCEDURE — 99212 OFFICE O/P EST SF 10 MIN: CPT | Performed by: SURGERY

## 2021-11-24 PROCEDURE — 99213 OFFICE O/P EST LOW 20 MIN: CPT | Performed by: SURGERY

## 2021-11-24 NOTE — PROGRESS NOTES
[facility]  PROGRESS NOTE  ATTENDING NOTE    Chief Complaint   Patient presents with    Wound Check     wound check - Right breast     S:  55y/o F with recurrent right breast cancer. She underwent right MRM and left prophylactic mastectomy. She has had a wound dehiscence that has still not healed. She has completed chemo and is now on Herceptin. She continues to do wet to dry dressing. She thought she had a wound infection recently but she was put on antibiotics for a tooth infection and the breast infection improved as well. /62 (Site: Left Upper Arm)   Pulse 90   Temp 97.2 °F (36.2 °C)   Resp 18   Ht 4' 10\" (1.473 m)   Wt 145 lb (65.8 kg)   SpO2 98%   BMI 30.31 kg/m²   Gen:  NAD  Right breast:  Nonhealing wound, ~3.5cm in size. Sharp debridement of some subcutaneous tissue. No evidence of infection. Limited mobility in tissue for closure. Evidence of new skin forming, but slowly    ASSESSMENT/PLAN:  Stage IIIA recurrent right breast cancer--s/p right MRM and left prophylactic mastectomy. --f/u with oncology  --continue wet to dry  --discussed referral to wound care--will hold at this time  --RTC in January, hope to do an in office closure at that point now that she is off chemo.       Jeane Langston MD, MSc, FACS  11/24/2021  3:16 PM

## 2022-01-14 ENCOUNTER — OFFICE VISIT (OUTPATIENT)
Dept: BREAST CENTER | Age: 52
End: 2022-01-14
Payer: COMMERCIAL

## 2022-01-14 VITALS
SYSTOLIC BLOOD PRESSURE: 132 MMHG | TEMPERATURE: 97.3 F | OXYGEN SATURATION: 98 % | DIASTOLIC BLOOD PRESSURE: 68 MMHG | BODY MASS INDEX: 30.54 KG/M2 | HEIGHT: 58 IN | HEART RATE: 98 BPM | WEIGHT: 145.5 LBS

## 2022-01-14 DIAGNOSIS — Z51.89 VISIT FOR WOUND CHECK: ICD-10-CM

## 2022-01-14 DIAGNOSIS — Z17.1 MALIGNANT NEOPLASM OF CENTRAL PORTION OF RIGHT BREAST IN FEMALE, ESTROGEN RECEPTOR NEGATIVE (HCC): ICD-10-CM

## 2022-01-14 DIAGNOSIS — C50.111 MALIGNANT NEOPLASM OF CENTRAL PORTION OF RIGHT BREAST IN FEMALE, ESTROGEN RECEPTOR NEGATIVE (HCC): ICD-10-CM

## 2022-01-14 DIAGNOSIS — T81.30XA WOUND DEHISCENCE: Primary | ICD-10-CM

## 2022-01-14 PROCEDURE — 4004F PT TOBACCO SCREEN RCVD TLK: CPT | Performed by: SURGERY

## 2022-01-14 PROCEDURE — G8417 CALC BMI ABV UP PARAM F/U: HCPCS | Performed by: SURGERY

## 2022-01-14 PROCEDURE — 99214 OFFICE O/P EST MOD 30 MIN: CPT | Performed by: SURGERY

## 2022-01-14 PROCEDURE — 99213 OFFICE O/P EST LOW 20 MIN: CPT | Performed by: SURGERY

## 2022-01-14 PROCEDURE — G8427 DOCREV CUR MEDS BY ELIG CLIN: HCPCS | Performed by: SURGERY

## 2022-01-14 PROCEDURE — G8484 FLU IMMUNIZE NO ADMIN: HCPCS | Performed by: SURGERY

## 2022-01-14 ASSESSMENT — ENCOUNTER SYMPTOMS
EYES NEGATIVE: 1
ANAL BLEEDING: 0
NAUSEA: 0
RESPIRATORY NEGATIVE: 1
COUGH: 0
DIARRHEA: 0
SHORTNESS OF BREATH: 0
BLOOD IN STOOL: 0
GASTROINTESTINAL NEGATIVE: 1
VOMITING: 0
ALLERGIC/IMMUNOLOGIC NEGATIVE: 1
CONSTIPATION: 0
ABDOMINAL PAIN: 0
BACK PAIN: 0
ABDOMINAL DISTENTION: 0

## 2022-01-14 NOTE — PROGRESS NOTES
Pharynx: Oropharynx is clear. Eyes:      Extraocular Movements: Extraocular movements intact. Pupils: Pupils are equal, round, and reactive to light. Cardiovascular:      Rate and Rhythm: Normal rate and regular rhythm. Pulses: Normal pulses. Heart sounds: Normal heart sounds. Pulmonary:      Effort: Pulmonary effort is normal.      Breath sounds: Normal breath sounds. Chest:       Musculoskeletal:         General: No tenderness or signs of injury. Cervical back: Normal range of motion and neck supple. Skin:     General: Skin is warm and dry. Neurological:      General: No focal deficit present. Mental Status: She is alert and oriented to person, place, and time. Psychiatric:         Mood and Affect: Mood normal.         Behavior: Behavior normal.         Thought Content: Thought content normal.         Judgment: Judgment normal.         ASSESSMENT/PLAN:  Stage IIIa recurrent right breast cancer, grade 3, ER/[place] negative HER2 positive  Right mastectomy wound dehiscence  -- Plan for operative intervention with wound revision and closure with possible wound VAC. I went over the risk benefits possible alternatives to procedure. I discussed seeing wound care versus plastic surgery as well. I explained to her that sometimes even this surgery fails but we will need to leave her sutures in as long as possible to get wound healing and closure. She understands procedure and wishes to proceed. We will time the procedure in between her chemotherapy treatments in February.     Heather Hernandez MD, MSc, FACS  1/14/2022  4:13 PM

## 2022-01-19 ENCOUNTER — TELEPHONE (OUTPATIENT)
Dept: BREAST CENTER | Age: 52
End: 2022-01-19

## 2022-01-19 NOTE — TELEPHONE ENCOUNTER
LEANN Guerin Ala called and spoke to [facility] and scheduled PT for right chest wall closure with tissue rearrangement and possible wound vac placement on 02/08/2022 @ 9:30am with Dr. Rosalba Moore. PT is not taking ASA/blood thinner products. PT has received both COVID 19 vaccines. PT verbalized she understood prep instructions, and NPO after midnight. PT verbalized that she understood appointment date/time, as well as to arrive 2 hours prior to procedure. PT advised PAT will call to go over all medication and advise on where to park and enter the hospital at for procedure. PT has been told to make sure they have a ride to and from procedure as they are not allowed to drive after procedure. PT procedure letter was mailed to them with all instructions also on it. MA instructed PT to call the office at [phone] with any questions, comments, or concerns about the procedure. MA scheduled patient post-op appointment for 02/25/2022@ 8:45am in [facility] with .          Electronically signed by Jimmy Cheney MA on 1/19/22 at 9:06 AM EST

## 2022-02-02 NOTE — PROGRESS NOTES
[facility] PRE-ADMISSION TESTING GENERAL INSTRUCTIONS- [facility]-phone number:[phone]    GENERAL INSTRUCTIONS  [x] Antibacterial Soap shower Night before and/or AM of Surgery    [x] Nothing by mouth after midnight, including gum, candy, mints, or water. [x] You may brush your teeth, gargle, but do NOT swallow water. [x]No smoking, chewing tobacco, illegal drugs, marijuana or alcohol within 24 hours of your surgery. [x] Jewelry, valuables or body piercing's should not be brought to the hospital. All body and/or tongue piercing's must be removed prior to arriving to hospital.  ALL hair pins must be removed. [x] Do not wear makeup, lotions, powders, deodorant. Nail polish as directed by the nurse. [x] Arrange transportation with a responsible adult  to and from the hospital. If you do not have a responsible adult  to transport you, you will need to make arrangements with a medical transportation company (i.e. [employer]. A Uber/taxi/bus is not appropriate unless you are accompanied by a responsible adult ). Arrange for someone to be with you for the remainder of the day and for 24 hours after your procedure due to having had anesthesia. Who will be your  for transportation? Ramesh,spouse  Who will be staying with you for 24 hrs after your procedure? Ramesh,spouse  [x] [employer] card and photo ID. PARKING INSTRUCTIONS:   [x] Arrival Time: 7:30 am,    One person may accompany you. You and your  will need to wear a mask. · [x] Parking lot '\"I\"  is located on [facility] (the corner of [facility]). To enter, press the button and the gate will lift. A free token will be provided to exit the lot. One car per patient is allowed to park in this lot. All other cars are to park on [address] [place] [address] either in the parking garage or the handicap lot.       Walk up the front walk to the [facility], the door will be locked an employee will greet you and let you in. EDUCATION INSTRUCTIONS:         .    [x]Pain: Post-op pain is normal and to be expected. You will be asked to rate your pain from 0-10 (a zero is not acceptable-education is needed). Your post-op pain goal is:   [x] Ask your nurse for your pain medication. MEDICATION INSTRUCTIONS:  [x]Bring a complete list of your medications, please write the last time you took the medicine, give this list to the nurse.  [] Take the following medications the morning of surgery with 1-2 ounces of water:  None    [x] Stop herbal supplements, ibuprofen (Nsaids)  and vitamins 5 days before your surgery. [x] Follow physician instructions regarding any blood thinners you may be taking. WHAT TO EXPECT:  [x] The day of surgery you will be greeted and checked in by the [employer]. Please bring your photo ID and insurance card. A nurse will greet you in accordance to the time you are needed in the pre-op area to prepare you for surgery. Please do not be discouraged if you are not greeted in the order you arrive as there are many variables that are involved in patient preparation. Your patience is greatly appreciated as you wait for your nurse. Please bring in items such as: books, magazines, newspapers, electronics, or any other items  to occupy your time in the waiting area. [x]  Delays may occur with surgery and staff will make a sincere effort to keep you informed of delays. If any delays occur with your procedure, we apologize ahead of time for your inconvenience as we recognize the value of your time.

## 2022-02-07 ENCOUNTER — ANESTHESIA EVENT (OUTPATIENT)
Dept: OPERATING ROOM | Age: 52
End: 2022-02-07
Payer: COMMERCIAL

## 2022-02-07 ENCOUNTER — PREP FOR PROCEDURE (OUTPATIENT)
Dept: SURGERY | Age: 52
End: 2022-02-07

## 2022-02-07 RX ORDER — SODIUM CHLORIDE 9 MG/ML
INJECTION, SOLUTION INTRAVENOUS CONTINUOUS
Status: CANCELLED | OUTPATIENT
Start: 2022-02-07

## 2022-02-07 RX ORDER — SODIUM CHLORIDE 9 MG/ML
25 INJECTION, SOLUTION INTRAVENOUS PRN
Status: CANCELLED | OUTPATIENT
Start: 2022-02-07

## 2022-02-07 RX ORDER — SODIUM CHLORIDE 0.9 % (FLUSH) 0.9 %
10 SYRINGE (ML) INJECTION PRN
Status: CANCELLED | OUTPATIENT
Start: 2022-02-07

## 2022-02-07 RX ORDER — SODIUM CHLORIDE 0.9 % (FLUSH) 0.9 %
10 SYRINGE (ML) INJECTION EVERY 12 HOURS SCHEDULED
Status: CANCELLED | OUTPATIENT
Start: 2022-02-07

## 2022-02-07 NOTE — H&P (VIEW-ONLY)
[facility]  HISTORY & PHYSICAL  ATTENDING NOTE            Chief Complaint   Patient presents with    Breast wound       Wound check - states it looks the same - states a little drainage, denies color or odor, denies increased pain, redness or irritation    Other       denies nausea, vomiting, fever, chills       HPI: 47-year-old female status post bilateral mastectomy with right axillary lymph node dissection for recurrent right breast cancer. Patient is currently undergoing chemo. She is tolerating well. She continues to have this open wound to the right breast.  She is doing wet-to-dry dressing.     Past Medical History:   Diagnosis Date    Cancer [facility]) 2008    right breast     Past Surgical History:   Procedure Laterality Date    BREAST BIOPSY Bilateral 7/6/2021    RIGHT MODIFIED RADICAL MASTECTOMY,  LEFT BREAST PROPHYLATIC MASTECTOMY performed by Dre Loya MD at . [address] LUMPECTOMY Right     CARPAL TUNNEL RELEASE Right     PARTIAL HYSTERECTOMY      PORT SURGERY Left 7/6/2021    LEFT SUBCLAVIAN MEDI PORT INSERTION performed by Dre L[facility] MD at [facility]       Family History   Problem Relation Age of Onset    Cancer Mother         lung     Social History     Tobacco Use    Smoking status: Current Every Day Smoker     Packs/day: 0.50    Smokeless tobacco: Never Used    Tobacco comment: 1/2 pack or less    Vaping Use    Vaping Use: Never used   Substance Use Topics    Alcohol use: Yes     Comment: occasionaly    Drug use: Never     Allergies   Allergen Reactions    Meperidine     Morphine     Sulfa Antibiotics Nausea And Vomiting and Other (See Comments)     fever     Current Outpatient Medications   Medication Sig Dispense Refill    NONFORMULARY Inject into the muscle every 21 days Chemo cocktail:  Pertuzumab, Trastuzumab, Hyaluronidase  Goes to [facility] for the injection       No current facility-administered medications for this visit.          Review of Systems   Constitutional: Negative. Negative for activity change, appetite change and unexpected weight change. HENT: Negative. Eyes: Negative. Respiratory: Negative. Negative for cough and shortness of breath. Cardiovascular: Negative. Negative for chest pain and leg swelling. Gastrointestinal: Negative. Negative for abdominal distention, abdominal pain, anal bleeding, blood in stool, constipation, diarrhea, nausea and vomiting. Endocrine: Negative. Genitourinary: Negative. Musculoskeletal: Negative. Negative for arthralgias, back pain, gait problem, joint swelling and myalgias. Skin: Negative. Allergic/Immunologic: Negative. Neurological: Negative. Negative for dizziness, weakness and headaches. Hematological: Negative. Psychiatric/Behavioral: Negative. Negative for confusion, decreased concentration and sleep disturbance.      /68 (Site: Left Upper Arm, Position: Sitting, Cuff Size: Large Adult)   Pulse 98   Temp 97.3 °F (36.3 °C) (Infrared)   Ht 4' 10\" (1.473 m)   Wt 145 lb 8 oz (66 kg)   SpO2 98%   BMI 30.41 kg/m²   Physical Exam  Constitutional:       Appearance: Normal appearance. She is normal weight. HENT:      Head: Normocephalic and atraumatic. Nose: Nose normal.      Mouth/Throat:      Mouth: Mucous membranes are moist.      Pharynx: Oropharynx is clear. Eyes:      Extraocular Movements: Extraocular movements intact. Pupils: Pupils are equal, round, and reactive to light. Cardiovascular:      Rate and Rhythm: Normal rate and regular rhythm. Pulses: Normal pulses. Heart sounds: Normal heart sounds. Pulmonary:      Effort: Pulmonary effort is normal.      Breath sounds: Normal breath sounds. Chest:       Musculoskeletal:         General: No tenderness or signs of injury. Cervical back: Normal range of motion and neck supple.    Skin:     General: Skin is warm and dry. Neurological:      General: No focal deficit present. Mental Status: She is alert and oriented to person, place, and time. Psychiatric:         Mood and Affect: Mood normal.         Behavior: Behavior normal.         Thought Content: Thought content normal.         Judgment: Judgment normal.            ASSESSMENT/PLAN:  Stage IIIa recurrent right breast cancer, grade 3, ER/HI negative HER2 positive  Right mastectomy wound dehiscence  -- Plan for operative intervention with wound revision and closure with possible wound VAC. I went over the risk benefits possible alternatives to procedure. I discussed seeing wound care versus plastic surgery as well. I explained to her that sometimes even this surgery fails but we will need to leave her sutures in as long as possible to get wound healing and closure. She understands procedure and wishes to proceed.   We will time the procedure in between her chemotherapy treatments in February.  Cora Dinero MD, MSc, [facility]  1/14/2022  4:13 PM

## 2022-02-07 NOTE — H&P
[facility]  HISTORY & PHYSICAL  ATTENDING NOTE            Chief Complaint   Patient presents with    Breast wound       Wound check - states it looks the same - states a little drainage, denies color or odor, denies increased pain, redness or irritation    Other       denies nausea, vomiting, fever, chills       HPI: 80-year-old female status post bilateral mastectomy with right axillary lymph node dissection for recurrent right breast cancer. Patient is currently undergoing chemo. She is tolerating well. She continues to have this open wound to the right breast.  She is doing wet-to-dry dressing.     Past Medical History:   Diagnosis Date    Cancer [facility]) 2008    right breast     Past Surgical History:   Procedure Laterality Date    BREAST BIOPSY Bilateral 7/6/2021    RIGHT MODIFIED RADICAL MASTECTOMY,  LEFT BREAST PROPHYLATIC MASTECTOMY performed by Milagros Packer MD at . [facility] LUMPECTOMY Right     CARPAL TUNNEL RELEASE Right     PARTIAL HYSTERECTOMY      PORT SURGERY Left 7/6/2021    LEFT SUBCLAVIAN MEDI PORT INSERTION performed by Milagros Packer MD at [facility]       Family History   Problem Relation Age of Onset    Cancer Mother         lung     Social History     Tobacco Use    Smoking status: Current Every Day Smoker     Packs/day: 0.50    Smokeless tobacco: Never Used    Tobacco comment: 1/2 pack or less    Vaping Use    Vaping Use: Never used   Substance Use Topics    Alcohol use: Yes     Comment: occasionaly    Drug use: Never     Allergies   Allergen Reactions    Meperidine     Morphine     Sulfa Antibiotics Nausea And Vomiting and Other (See Comments)     fever     Current Outpatient Medications   Medication Sig Dispense Refill    NONFORMULARY Inject into the muscle every 21 days Chemo cocktail:  Pertuzumab, Trastuzumab, Hyaluronidase  Goes to [facility] for the injection       No current facility-administered medications for this visit.          Review of Systems   Constitutional: Negative. Negative for activity change, appetite change and unexpected weight change. HENT: Negative. Eyes: Negative. Respiratory: Negative. Negative for cough and shortness of breath. Cardiovascular: Negative. Negative for chest pain and leg swelling. Gastrointestinal: Negative. Negative for abdominal distention, abdominal pain, anal bleeding, blood in stool, constipation, diarrhea, nausea and vomiting. Endocrine: Negative. Genitourinary: Negative. Musculoskeletal: Negative. Negative for arthralgias, back pain, gait problem, joint swelling and myalgias. Skin: Negative. Allergic/Immunologic: Negative. Neurological: Negative. Negative for dizziness, weakness and headaches. Hematological: Negative. Psychiatric/Behavioral: Negative. Negative for confusion, decreased concentration and sleep disturbance.      /68 (Site: Left Upper Arm, Position: Sitting, Cuff Size: Large Adult)   Pulse 98   Temp 97.3 °F (36.3 °C) (Infrared)   Ht 4' 10\" (1.473 m)   Wt 145 lb 8 oz (66 kg)   SpO2 98%   BMI 30.41 kg/m²   Physical Exam  Constitutional:       Appearance: Normal appearance. She is normal weight. HENT:      Head: Normocephalic and atraumatic. Nose: Nose normal.      Mouth/Throat:      Mouth: Mucous membranes are moist.      Pharynx: Oropharynx is clear. Eyes:      Extraocular Movements: Extraocular movements intact. Pupils: Pupils are equal, round, and reactive to light. Cardiovascular:      Rate and Rhythm: Normal rate and regular rhythm. Pulses: Normal pulses. Heart sounds: Normal heart sounds. Pulmonary:      Effort: Pulmonary effort is normal.      Breath sounds: Normal breath sounds. Chest:       Musculoskeletal:         General: No tenderness or signs of injury. Cervical back: Normal range of motion and neck supple.    Skin:     General: Skin is warm and dry. Neurological:      General: No focal deficit present. Mental Status: She is alert and oriented to person, place, and time. Psychiatric:         Mood and Affect: Mood normal.         Behavior: Behavior normal.         Thought Content: Thought content normal.         Judgment: Judgment normal.            ASSESSMENT/PLAN:  Stage IIIa recurrent right breast cancer, grade 3, ER/TX negative HER2 positive  Right mastectomy wound dehiscence  -- Plan for operative intervention with wound revision and closure with possible wound VAC. I went over the risk benefits possible alternatives to procedure. I discussed seeing wound care versus plastic surgery as well. I explained to her that sometimes even this surgery fails but we will need to leave her sutures in as long as possible to get wound healing and closure. She understands procedure and wishes to proceed.   We will time the procedure in between her chemotherapy treatments in February.  Adria Osei MD, MSc, [facility]  1/14/2022  4:13 PM

## 2022-02-08 ENCOUNTER — HOSPITAL ENCOUNTER (OUTPATIENT)
Age: 52
Setting detail: OUTPATIENT SURGERY
Discharge: HOME | End: 2022-02-08
Attending: SURGERY | Admitting: SURGERY
Payer: COMMERCIAL

## 2022-02-08 ENCOUNTER — ANESTHESIA (OUTPATIENT)
Dept: OPERATING ROOM | Age: 52
End: 2022-02-08
Payer: COMMERCIAL

## 2022-02-08 VITALS — DIASTOLIC BLOOD PRESSURE: 71 MMHG | OXYGEN SATURATION: 89 % | SYSTOLIC BLOOD PRESSURE: 99 MMHG

## 2022-02-08 VITALS
WEIGHT: 145 LBS | DIASTOLIC BLOOD PRESSURE: 90 MMHG | TEMPERATURE: 97.5 F | OXYGEN SATURATION: 97 % | SYSTOLIC BLOOD PRESSURE: 142 MMHG | HEIGHT: 58 IN | BODY MASS INDEX: 30.44 KG/M2 | RESPIRATION RATE: 16 BRPM | HEART RATE: 78 BPM

## 2022-02-08 PROCEDURE — 97605 NEG PRS WND THER DME<=50SQCM: CPT | Performed by: SURGERY

## 2022-02-08 PROCEDURE — 2709999900 HC NON-CHARGEABLE SUPPLY: Performed by: SURGERY

## 2022-02-08 PROCEDURE — 3700000001 HC ADD 15 MINUTES (ANESTHESIA): Performed by: SURGERY

## 2022-02-08 PROCEDURE — 2580000003 HC RX 258

## 2022-02-08 PROCEDURE — 3700000000 HC ANESTHESIA ATTENDED CARE: Performed by: SURGERY

## 2022-02-08 PROCEDURE — 6360000002 HC RX W HCPCS: Performed by: SURGERY

## 2022-02-08 PROCEDURE — 2500000003 HC RX 250 WO HCPCS: Performed by: SURGERY

## 2022-02-08 PROCEDURE — 7100000011 HC PHASE II RECOVERY - ADDTL 15 MIN: Performed by: SURGERY

## 2022-02-08 PROCEDURE — 6360000002 HC RX W HCPCS

## 2022-02-08 PROCEDURE — 3600000012 HC SURGERY LEVEL 2 ADDTL 15MIN: Performed by: SURGERY

## 2022-02-08 PROCEDURE — 2720000010 HC SURG SUPPLY STERILE: Performed by: SURGERY

## 2022-02-08 PROCEDURE — 6370000000 HC RX 637 (ALT 250 FOR IP): Performed by: ANESTHESIOLOGY

## 2022-02-08 PROCEDURE — 2580000003 HC RX 258: Performed by: SURGERY

## 2022-02-08 PROCEDURE — 7100000010 HC PHASE II RECOVERY - FIRST 15 MIN: Performed by: SURGERY

## 2022-02-08 PROCEDURE — 3600000002 HC SURGERY LEVEL 2 BASE: Performed by: SURGERY

## 2022-02-08 PROCEDURE — 13101 CMPLX RPR TRUNK 2.6-7.5 CM: CPT | Performed by: SURGERY

## 2022-02-08 RX ORDER — ACETAMINOPHEN 500 MG
1000 TABLET ORAL
Status: COMPLETED | OUTPATIENT
Start: 2022-02-08 | End: 2022-02-08

## 2022-02-08 RX ORDER — SODIUM CHLORIDE 9 MG/ML
INJECTION, SOLUTION INTRAVENOUS CONTINUOUS PRN
Status: DISCONTINUED | OUTPATIENT
Start: 2022-02-08 | End: 2022-02-08 | Stop reason: SDUPTHER

## 2022-02-08 RX ORDER — FIBRINOGEN HUMAN AND THROMBIN HUMAN 1 ML
KIT TOPICAL PRN
Status: DISCONTINUED | OUTPATIENT
Start: 2022-02-08 | End: 2022-02-08 | Stop reason: ALTCHOICE

## 2022-02-08 RX ORDER — BUPIVACAINE HYDROCHLORIDE AND EPINEPHRINE 2.5; 5 MG/ML; UG/ML
INJECTION, SOLUTION EPIDURAL; INFILTRATION; INTRACAUDAL; PERINEURAL PRN
Status: DISCONTINUED | OUTPATIENT
Start: 2022-02-08 | End: 2022-02-08 | Stop reason: ALTCHOICE

## 2022-02-08 RX ORDER — PROMETHAZINE HYDROCHLORIDE 25 MG/ML
25 INJECTION, SOLUTION INTRAMUSCULAR; INTRAVENOUS PRN
Status: DISCONTINUED | OUTPATIENT
Start: 2022-02-08 | End: 2022-02-08 | Stop reason: HOSPADM

## 2022-02-08 RX ORDER — FENTANYL CITRATE 50 UG/ML
25 INJECTION, SOLUTION INTRAMUSCULAR; INTRAVENOUS EVERY 5 MIN PRN
Status: DISCONTINUED | OUTPATIENT
Start: 2022-02-08 | End: 2022-02-08 | Stop reason: HOSPADM

## 2022-02-08 RX ORDER — MEPERIDINE HYDROCHLORIDE 25 MG/ML
12.5 INJECTION INTRAMUSCULAR; INTRAVENOUS; SUBCUTANEOUS EVERY 5 MIN PRN
Status: DISCONTINUED | OUTPATIENT
Start: 2022-02-08 | End: 2022-02-08 | Stop reason: HOSPADM

## 2022-02-08 RX ORDER — SODIUM CHLORIDE 0.9 % (FLUSH) 0.9 %
10 SYRINGE (ML) INJECTION EVERY 12 HOURS SCHEDULED
Status: DISCONTINUED | OUTPATIENT
Start: 2022-02-08 | End: 2022-02-08 | Stop reason: HOSPADM

## 2022-02-08 RX ORDER — PROPOFOL 10 MG/ML
INJECTION, EMULSION INTRAVENOUS PRN
Status: DISCONTINUED | OUTPATIENT
Start: 2022-02-08 | End: 2022-02-08 | Stop reason: SDUPTHER

## 2022-02-08 RX ORDER — SODIUM CHLORIDE 9 MG/ML
INJECTION, SOLUTION INTRAVENOUS CONTINUOUS
Status: DISCONTINUED | OUTPATIENT
Start: 2022-02-08 | End: 2022-02-08 | Stop reason: HOSPADM

## 2022-02-08 RX ORDER — FENTANYL CITRATE 50 UG/ML
50 INJECTION, SOLUTION INTRAMUSCULAR; INTRAVENOUS EVERY 5 MIN PRN
Status: DISCONTINUED | OUTPATIENT
Start: 2022-02-08 | End: 2022-02-08 | Stop reason: HOSPADM

## 2022-02-08 RX ORDER — SODIUM CHLORIDE 0.9 % (FLUSH) 0.9 %
10 SYRINGE (ML) INJECTION PRN
Status: DISCONTINUED | OUTPATIENT
Start: 2022-02-08 | End: 2022-02-08 | Stop reason: HOSPADM

## 2022-02-08 RX ORDER — FENTANYL CITRATE 50 UG/ML
INJECTION, SOLUTION INTRAMUSCULAR; INTRAVENOUS PRN
Status: DISCONTINUED | OUTPATIENT
Start: 2022-02-08 | End: 2022-02-08 | Stop reason: SDUPTHER

## 2022-02-08 RX ORDER — SODIUM CHLORIDE 9 MG/ML
25 INJECTION, SOLUTION INTRAVENOUS PRN
Status: DISCONTINUED | OUTPATIENT
Start: 2022-02-08 | End: 2022-02-08 | Stop reason: HOSPADM

## 2022-02-08 RX ORDER — LABETALOL HYDROCHLORIDE 5 MG/ML
5 INJECTION, SOLUTION INTRAVENOUS EVERY 10 MIN PRN
Status: DISCONTINUED | OUTPATIENT
Start: 2022-02-08 | End: 2022-02-08 | Stop reason: HOSPADM

## 2022-02-08 RX ORDER — MIDAZOLAM HYDROCHLORIDE 1 MG/ML
INJECTION INTRAMUSCULAR; INTRAVENOUS PRN
Status: DISCONTINUED | OUTPATIENT
Start: 2022-02-08 | End: 2022-02-08 | Stop reason: SDUPTHER

## 2022-02-08 RX ADMIN — MIDAZOLAM 2 MG: 1 INJECTION INTRAMUSCULAR; INTRAVENOUS at 12:28

## 2022-02-08 RX ADMIN — FENTANYL CITRATE 50 MCG: 50 INJECTION, SOLUTION INTRAMUSCULAR; INTRAVENOUS at 12:28

## 2022-02-08 RX ADMIN — Medication 2000 MG: at 11:56

## 2022-02-08 RX ADMIN — SODIUM CHLORIDE: 9 INJECTION, SOLUTION INTRAVENOUS at 11:41

## 2022-02-08 RX ADMIN — PROPOFOL 125 MCG/KG/MIN: 10 INJECTION, EMULSION INTRAVENOUS at 11:58

## 2022-02-08 RX ADMIN — FENTANYL CITRATE 50 MCG: 50 INJECTION, SOLUTION INTRAMUSCULAR; INTRAVENOUS at 12:20

## 2022-02-08 RX ADMIN — FENTANYL CITRATE 50 MCG: 50 INJECTION, SOLUTION INTRAMUSCULAR; INTRAVENOUS at 11:57

## 2022-02-08 RX ADMIN — SODIUM CHLORIDE: 9 INJECTION, SOLUTION INTRAVENOUS at 07:51

## 2022-02-08 RX ADMIN — FENTANYL CITRATE 50 MCG: 50 INJECTION, SOLUTION INTRAMUSCULAR; INTRAVENOUS at 12:02

## 2022-02-08 RX ADMIN — MIDAZOLAM 2 MG: 1 INJECTION INTRAMUSCULAR; INTRAVENOUS at 11:49

## 2022-02-08 RX ADMIN — PROPOFOL 100 MG: 10 INJECTION, EMULSION INTRAVENOUS at 11:57

## 2022-02-08 RX ADMIN — ACETAMINOPHEN 1000 MG: 500 TABLET ORAL at 13:37

## 2022-02-08 RX ADMIN — FENTANYL CITRATE 50 MCG: 50 INJECTION, SOLUTION INTRAMUSCULAR; INTRAVENOUS at 12:10

## 2022-02-08 ASSESSMENT — PULMONARY FUNCTION TESTS
PIF_VALUE: 1
PIF_VALUE: 1
PIF_VALUE: 0
PIF_VALUE: 1
PIF_VALUE: 1
PIF_VALUE: 0
PIF_VALUE: 1
PIF_VALUE: 1
PIF_VALUE: 0
PIF_VALUE: 1
PIF_VALUE: 0
PIF_VALUE: 1
PIF_VALUE: 0
PIF_VALUE: 1
PIF_VALUE: 1
PIF_VALUE: 0
PIF_VALUE: 1
PIF_VALUE: 1
PIF_VALUE: 0
PIF_VALUE: 1
PIF_VALUE: 0
PIF_VALUE: 1
PIF_VALUE: 0
PIF_VALUE: 1
PIF_VALUE: 0
PIF_VALUE: 1
PIF_VALUE: 0
PIF_VALUE: 0
PIF_VALUE: 1
PIF_VALUE: 1
PIF_VALUE: 0
PIF_VALUE: 1
PIF_VALUE: 1

## 2022-02-08 ASSESSMENT — PAIN DESCRIPTION - ORIENTATION
ORIENTATION: RIGHT
ORIENTATION: RIGHT

## 2022-02-08 ASSESSMENT — PAIN SCALES - GENERAL
PAINLEVEL_OUTOF10: 4
PAINLEVEL_OUTOF10: 4
PAINLEVEL_OUTOF10: 3

## 2022-02-08 ASSESSMENT — PAIN - FUNCTIONAL ASSESSMENT: PAIN_FUNCTIONAL_ASSESSMENT: 0-10

## 2022-02-08 ASSESSMENT — PAIN DESCRIPTION - LOCATION
LOCATION: CHEST
LOCATION: CHEST

## 2022-02-08 ASSESSMENT — PAIN DESCRIPTION - DESCRIPTORS
DESCRIPTORS: DISCOMFORT
DESCRIPTORS: DISCOMFORT;SORE

## 2022-02-08 ASSESSMENT — PAIN DESCRIPTION - FREQUENCY
FREQUENCY: INTERMITTENT
FREQUENCY: INTERMITTENT

## 2022-02-08 ASSESSMENT — LIFESTYLE VARIABLES: SMOKING_STATUS: 1

## 2022-02-08 NOTE — ANESTHESIA POSTPROCEDURE EVALUATION
Department of Anesthesiology  Postprocedure Note    Patient: Chloe Cheney  MRN: [id number]  YOB: 1970  Date of evaluation: 2/8/2022  Time:  2:41 PM     Procedure Summary     Date: 02/08/22 Room / Location: [facility] OR 04 / [facility]    Anesthesia Start: 1141 Anesthesia Stop: 2351    Procedure: RIGHT CHEST WALL COMPLEX WOUND CLOSURE AND WOUND VAC (Right Breast) Diagnosis: (WOUND DEHISCENCE)    Surgeons: Tamy Nation MD Responsible Provider: Umberto Lara MD    Anesthesia Type: general ASA Status: 3          Anesthesia Type: general    Tanvi Phase I: Tanvi Score: 10    Tanvi Phase II: Tanvi Score: 10    Last vitals: Reviewed and per EMR flowsheets.        Anesthesia Post Evaluation    Patient location during evaluation: PACU  Patient participation: complete - patient participated  Level of consciousness: awake  Pain score: 0  Airway patency: patent  Nausea & Vomiting: no nausea  Complications: no  Cardiovascular status: hemodynamically stable  Respiratory status: acceptable  Hydration status: stable

## 2022-02-08 NOTE — ANESTHESIA PRE PROCEDURE
Department of Anesthesiology  Preprocedure Note       Name:  Juanita Eagle   Age:  54 y.o.  :  1970                                          MRN:  [id number]         Date:  2022      Surgeon: Benedict Hays):  Bryanna Rodriguez MD    Procedure: Procedure(s):  RIGHT CHEST WALL WOUND CLOSURE WITH TISSUE REARRANGEMENT, POSSIBLE WOUND VAC    Medications prior to admission:   Prior to Admission medications    Medication Sig Start Date End Date Taking? Authorizing Provider   NONFORMULARY Inject into the muscle every 21 days Chemo cocktail:  Pertuzumab, Trastuzumab, Hyaluronidase  Goes to [facility] for the injection    Historical Provider, MD       Current medications:    No current facility-administered medications for this visit. No current outpatient medications on file. Facility-Administered Medications Ordered in Other Visits   Medication Dose Route Frequency Provider Last Rate Last Admin    0.9 % sodium chloride infusion   IntraVENous Continuous Bryanna Rodriguez  mL/hr at 22 0751 New Bag at 22 0751    0.9 % sodium chloride infusion  25 mL IntraVENous PRN Bryanna Rodriguez MD        ceFAZolin (ANCEF) 2000 mg in sterile water 20 mL IV syringe  2,000 mg IntraVENous On Call to [address], MD        sodium chloride flush 0.9 % injection 10 mL  10 mL IntraVENous 2 times per day Bryanna Rodriguez MD        sodium chloride flush 0.9 % injection 10 mL  10 mL IntraVENous PRN Bryanna Rodriguez MD           Allergies:     Allergies   Allergen Reactions    Meperidine     Morphine     Sulfa Antibiotics Nausea And Vomiting and Other (See Comments)     fever       Problem List:    Patient Active Problem List   Diagnosis Code    Malignant ne[facility] of central portion of right breast in female, [facility] C50.111, Z17.1    Recurrent [facility] cancer, right ([facility] C50.911    S/P mastectomy, bilateral Z90.13    Wound dehiscence T81.30XA       Past Medical History:        Diagnosis Date    Cancer [facility]) 2008    right breast       Past Surgical History:        Procedure Laterality Date    BREAST BIOPSY Bilateral 7/6/2021    RIGHT MODIFIED RADICAL MASTECTOMY,  LEFT BREAST PROPHYLATIC MASTECTOMY performed by Andres Garcia MD at . [address] LUMPECTOMY Right     CARPAL TUNNEL RELEASE Right     PARTIAL HYSTERECTOMY      PORT SURGERY Left 7/6/2021    LEFT SUBCLAVIAN MEDI PORT INSERTION performed by Andres Garcia MD at [place] [address]         Social History:    Social History     Tobacco Use    Smoking status: Current Every Day Smoker     Packs/day: 0.50    Smokeless tobacco: Never Used    Tobacco comment: 1/2 pack or less    Substance Use Topics    Alcohol use: Yes     Comment: occasionaly                                Ready to quit: Not Answered  Counseling given: Not Answered  Comment: 1/2 pack or less       Vital Signs (Current): There were no vitals filed for this visit.                                            BP Readings from Last 3 Encounters:   02/08/22 (!) 154/92   01/14/22 132/68   11/24/21 120/62       NPO Status:                                                                                 BMI:   Wt Readings from Last 3 Encounters:   02/08/22 145 lb (65.8 kg)   01/14/22 145 lb 8 oz (66 kg)   11/24/21 145 lb (65.8 kg)     There is no height or weight on file to calculate BMI.    CBC:   Lab Results   Component Value Date    WBC 8.8 07/06/2021    RBC 4.51 07/06/2021    HGB 15.4 07/06/2021    HCT 45.0 07/06/2021    MCV 99.8 07/06/2021    RDW 12.1 07/06/2021     07/06/2021       CMP:   Lab Results   Component Value Date     06/04/2021    K 4.0 06/04/2021     06/04/2021    CO2 23 06/04/2021    BUN 10 06/04/2021    CREATININE 0.6 06/04/2021    GFRAA >60 06/04/2021    LABGLOM >60 06/04/2021    GLUCOSE 96 06/04/2021    GLUCOSE 178 05/30/2012    PROT 7.8 06/04/2021    CALCIUM 9.9 06/04/2021 BILITOT <0.2 06/04/2021    ALKPHOS 99 06/04/2021    AST 32 06/04/2021    ALT 35 06/04/2021       POC Tests: No results for input(s): POCGLU, POCNA, POCK, POCCL, POCBUN, POCHEMO, POCHCT in the last 72 hours. Coags: No results found for: PROTIME, INR, APTT    HCG (If Applicable):   Lab Results   Component Value Date    PREGTESTUR NEGATIVE 05/27/2011        ABGs: No results found for: PHART, PO2ART, FTJ3ZVS, RLE2TEZ, BEART, R9HCNYYD     Type & Screen (If Applicable):  No results found for: LABABO, LABRH    Drug/Infectious Status (If Applicable):  No results found for: HIV, HEPCAB    COVID-19 Screening (If Applicable): No results found for: COVID19        Anesthesia Evaluation  Patient summary reviewed no history of anesthetic complications:   Airway: Mallampati: III  TM distance: >3 FB   Neck ROM: full  Mouth opening: > = 3 FB Dental:          Pulmonary: breath sounds clear to auscultation  (+) current smoker (10-15/ day)          Patient did not smoke on day of surgery. Cardiovascular:Negative CV ROS            Rhythm: regular  Rate: normal                    Neuro/Psych:   Negative Neuro/Psych ROS              GI/Hepatic/Renal: Neg GI/Hepatic/Renal ROS            Endo/Other:    (+) malignancy/cancer. ROS comment: WOUND DEHISCENCE    Malignant neoplasm of central portion of right breast in female, estrogen receptor negative   Recurrent breast cancer, Right     Abdominal:   (+) obese,           Vascular: negative vascular ROS. Other Findings:             Anesthesia Plan      MAC     ASA 3       Induction: intravenous. MIPS: Postoperative opioids intended and Prophylactic antiemetics administered. Anesthetic plan and risks discussed with patient. Plan discussed with CRNA.                 Merlyn Velasco MD   2/8/2022

## 2022-02-08 NOTE — OP NOTE
[facility]  OPERATIVE REPORT    Vincent Rae  1970      DATE OF PROCEDURE: 2/8/2022     SURGEON: Carol Bates MD, MSc, FACS     ASSISTANT: KARTHIKEYAN Kamara DO, PGY-I     PREOPERATIVE DIAGNOSIS: Right mastectomy wound dehiscence. POSTOPERATIVE DIAGNOSIS: Same    OPERATION: Complex closure of right mastectomy wound 5 cm x 10 cm, application of willa dressing     ANESTHESIA: LMAC     ESTIMATED BLOOD LOSS: 20 cc     COMPLICATIONS: None    SPECIMEN: None    DRAINS: Willa dressing    HISTORY:  This is a 54 y. o.female who had undergone a bilateral mastectomy the summer 2021. She had prior right breast lumpectomy with radiation. She had a wound dehiscence a couple weeks after her mastectomy. She has been doing wet-to-dry dressings are initially close down some but now it is opened up again and has enlarged. I went over the risks and benefits of wound closure including infection and breakdown again. I explained to her that if we leave the stitches in for a longer period of time we may be able to get this to close. She understood and wished to proceed. DESCRIPTION OF PROCEDURE: The patient was identified and the procedure was confirmed. Ancef 2 g IV was given, bilateral SCDs in place. Patient was prepped and draped standard surgical fashion. Using the peak plasma blade I undermined the wound along the chest wall and anterior to the pectoralis muscle 5 to 7 cm in all directions. We then irrigated copiously with normal saline. I scored the capsule and scar tissue on the posterior aspect of the wound with the PEAK PlasmaBlade. She had a little bit of bleeding from muscle. This was controlled with the [address] [place] [address] Drive and then thrombin spray was also used. We then closed the wound with 2-0 Vicryl and 2-0 nylon. The 2 nylons were placed in a vertical mattress fashion. Clean the patient went dry sponge. We then applied a willa dressing to aid with wound closure.     Needle, sponge, and instrument counts were reported as correct x2. The patient tolerated the procedure and was transferred to the recovery area in satisfactory condition. Her  was updated at the end the case.     Brisa Cardozo MD, MSc, FACS  2/8/2022  1:13 PM

## 2022-02-14 ENCOUNTER — TELEPHONE (OUTPATIENT)
Dept: BREAST CENTER | Age: 52
End: 2022-02-14

## 2022-02-14 ENCOUNTER — OFFICE VISIT (OUTPATIENT)
Dept: BREAST CENTER | Age: 52
End: 2022-02-14

## 2022-02-14 VITALS — HEIGHT: 59 IN | BODY MASS INDEX: 29.23 KG/M2 | WEIGHT: 145 LBS

## 2022-02-14 DIAGNOSIS — T81.31XD DEHISCENCE OF CLOSURE OF SKIN, SUBSEQUENT ENCOUNTER: Primary | ICD-10-CM

## 2022-02-14 DIAGNOSIS — Z09 POSTOP CHECK: Primary | ICD-10-CM

## 2022-02-14 PROCEDURE — 99024 POSTOP FOLLOW-UP VISIT: CPT | Performed by: SURGERY

## 2022-02-14 NOTE — TELEPHONE ENCOUNTER
----- Message from Iman Ortega MD sent at 2/14/2022  7:31 AM EST -----  Regarding: follow-up and Yulisa Davis had her FERNANDO dressing changed over the weekend. [facility] I need the wound vac forms to fill out. I will likely need to see her in the office tomorrow, but will have to let you know later a time as I have cases to add on. I will need to change her dressing tomorrow.

## 2022-02-14 NOTE — TELEPHONE ENCOUNTER
RN [employer] to check process time on wound vac. RN spoke to Nicola who states the order is still pending but she will send a message to the intake department in regards to contacting and giving an update.

## 2022-02-14 NOTE — TELEPHONE ENCOUNTER
RN printed [facility] wound vac form and filled out.  signed and RN faxed to number provided on form. RN received fax confirmation and scanned order in medi in chart.

## 2022-02-17 ENCOUNTER — TELEPHONE (OUTPATIENT)
Dept: BREAST CENTER | Age: 52
End: 2022-02-17

## 2022-02-17 NOTE — TELEPHONE ENCOUNTER
[facility] called asking about orders for the wound vac for  this patient. ( pressure, continuous vs intermittent, times a week to change and foam color)    Will message Dr. Thalia Hong to place orders in Epic.

## 2022-02-17 NOTE — TELEPHONE ENCOUNTER
Spoke with Daniela Hall, intake nurse for [facility] regarding the orders needed for wound vac dressing changed. Per Dr Todd Cordoba, wound needs changed twice weekly, continuous with 125 mm HG, place adaptic first then black sponge. Daniela Hall verified these orders.

## 2022-02-18 NOTE — TELEPHONE ENCOUNTER
RN contacted patient to see if received wound vac yet. Patient states that she received the wound vac yesterdat 2/17/2022 and called [facility]. [facility] is to call patient this am to set up a time to place wound vac. RN advised patient that if [facility] get her in to call the office and maybe we can arrange something so that the wound vac can be placed.

## 2022-02-25 ENCOUNTER — OFFICE VISIT (OUTPATIENT)
Dept: BREAST CENTER | Age: 52
End: 2022-02-25

## 2022-02-25 VITALS
OXYGEN SATURATION: 100 % | TEMPERATURE: 97.2 F | HEART RATE: 80 BPM | DIASTOLIC BLOOD PRESSURE: 100 MMHG | BODY MASS INDEX: 29.23 KG/M2 | WEIGHT: 145 LBS | SYSTOLIC BLOOD PRESSURE: 143 MMHG | HEIGHT: 59 IN | RESPIRATION RATE: 14 BRPM

## 2022-02-25 DIAGNOSIS — T81.30XA WOUND DEHISCENCE: Primary | ICD-10-CM

## 2022-02-25 PROCEDURE — 99024 POSTOP FOLLOW-UP VISIT: CPT | Performed by: SURGERY

## 2022-02-25 NOTE — PROGRESS NOTES
[facility]  PROGRESS NOTE  ATTENDING NOTE    Chief Complaint   Patient presents with    Post-Op Check     P/O right chest wall wound closure and tissue rearrangement DOS 02/8/2022. Patient states doing good. S: 44-year-old female with history of right breast cancer. She has prior radiation. She had a mastectomy wound dehiscence. We have been dealing with her wound since July. About 2 weeks ago we took her to the operating room for debridement and closure. She had a willa dressing on it worked well for the first week but she had a lot of drainage. We decided to place an incisional VAC on and she had home health care come out. They try to put the [facility] inside the wound which was closed. So she has a slight dehiscence medially. She has been changing it twice a week. The wound looks little macerated and wet. Some of the stitches are getting some overgrowth I removed 3 stitches laterally today. I change the VAC by removing the dressing. I removed 3 stitches laterally. I then made sure that there was no other stitches digging in the skin. I then placed a piece of Adaptic and then the black small sponge. Cut to size. We then applies the transparent dressing and the wound VAC. She had good suction and suction to -125 mmHg. Return to clinic 1 week for VAC change.     Michial Holstein, MD, MSc, FACS  2/25/2022  9:53 AM

## 2022-03-04 ENCOUNTER — OFFICE VISIT (OUTPATIENT)
Dept: BREAST CENTER | Age: 52
End: 2022-03-04

## 2022-03-04 VITALS
TEMPERATURE: 97.5 F | BODY MASS INDEX: 29.23 KG/M2 | OXYGEN SATURATION: 98 % | HEART RATE: 81 BPM | WEIGHT: 145 LBS | RESPIRATION RATE: 12 BRPM | HEIGHT: 59 IN | DIASTOLIC BLOOD PRESSURE: 86 MMHG | SYSTOLIC BLOOD PRESSURE: 134 MMHG

## 2022-03-04 DIAGNOSIS — T81.30XA WOUND DEHISCENCE: Primary | ICD-10-CM

## 2022-03-04 PROCEDURE — 99024 POSTOP FOLLOW-UP VISIT: CPT | Performed by: SURGERY

## 2022-03-04 NOTE — PROGRESS NOTES
[facility]  PROGRESS NOTE  ATTENDING NOTE    Chief Complaint   Patient presents with    Post-Op Check     2nd P/O right chest wall wound closure and tissue rearrangement DOS 02/8/2022, wound vac change     S: 57y/o F presents for f/u of right mastectomy wound dehiscence. She is disappointed as not healed and not healing faster. The amount of drainage she is having is greatly decreasing. Wound still appears macerated from being wet though. She is getting a little bit of overgrowth over the suture. The dehiscence looks a little larger than last time. Wound VAC removed. Wound assessed. Sutures moved so that they were removed as a way of skin overgrowth. Adaptic placed and then black sponge. And then transparent dressing placed in the usual fashion. Suction tubing applied and she had good seal.  There are no signs of infection. Next visit we will likely put the black sponge inside the opening without the Adaptic but not undermine it. We discussed other interventions in the near future such as hyperbarics or referral to plastic surgery for possible skin graft. I told her I had like to get her to 6 to 8 weeks to see how this progresses however at this point in time even they were having less drainage wound looks like it is a little bit open more than prior.     RTC 2 weeks    Sumaya Gaviria MD, MSc, FACS  3/4/2022  8:55 AM

## 2022-03-18 ENCOUNTER — OFFICE VISIT (OUTPATIENT)
Dept: BREAST CENTER | Age: 52
End: 2022-03-18

## 2022-03-18 ENCOUNTER — TELEPHONE (OUTPATIENT)
Dept: BREAST CENTER | Age: 52
End: 2022-03-18

## 2022-03-18 ENCOUNTER — TELEPHONE (OUTPATIENT)
Dept: SURGERY | Age: 52
End: 2022-03-18

## 2022-03-18 VITALS
HEIGHT: 58 IN | OXYGEN SATURATION: 97 % | SYSTOLIC BLOOD PRESSURE: 136 MMHG | RESPIRATION RATE: 18 BRPM | WEIGHT: 147 LBS | TEMPERATURE: 98.6 F | HEART RATE: 77 BPM | DIASTOLIC BLOOD PRESSURE: 70 MMHG | BODY MASS INDEX: 30.86 KG/M2

## 2022-03-18 DIAGNOSIS — T81.31XD WOUND DEHISCENCE, SURGICAL, SUBSEQUENT ENCOUNTER: Primary | ICD-10-CM

## 2022-03-18 PROCEDURE — 99024 POSTOP FOLLOW-UP VISIT: CPT | Performed by: SURGERY

## 2022-03-18 NOTE — TELEPHONE ENCOUNTER
MA received a call from pt requesting that an order for oil dressings be faxed to [facility] (her medical supplier) with the reference number [id number] so she is able to get those for her dressing changes. MA routing to Dr. Shannan Knapp for advisement.   Electronically signed by Florencio Longoria on 3/18/2022 at 3:53 PM

## 2022-03-18 NOTE — PROGRESS NOTES
[facility]  PROGRESS NOTE  ATTENDING NOTE    Chief Complaint   Patient presents with    Wound Check     S: 82-year-old female status post bilateral mastectomy who had a wound dehiscence. We took her back to the OR on February 8 to close her wound but she had another dehiscence medially. We have been doing a wound VAC because she had a lot of drainage initially. The drainage is greatly slowed down. She does have a small when you remove the wound VAC with no obvious infection. /70 (Site: Left Upper Arm)   Pulse 77   Temp 98.6 °F (37 °C)   Resp 18   Ht 4' 10\" (1.473 m)   Wt 147 lb (66.7 kg)   SpO2 97%   BMI 30.72 kg/m²   Gen:  NAD  Right chest: I removed the remaining stitches today she had a little bit of bleeding from the removal.  She does have feeling this area again. She is making new skin at the dehiscence site. She does have some yeast in the folds of the skin that I cleaned out with saline and cotton tip applicator. Wound was cleaned with saline and patted dry. We then placed a Adaptic down and the black sponge. We applied the transparent dressing in the usual fashion and then the suction to the wound VAC. She had good suction on the wound VAC. ASSESSMENT/PLAN:  Right mastectomy wound dehiscence now chronic wound  --I have gone over options including referral to wound care for possible hyperbarics. I have discussed referral to plastic surgery. I have discussed continuing this treatment. I discussed placing additional sutures in the area to see if we can get it to heal.  Or switching up to wet-to-dry. She does not wish to do sutures she is okay with being referred to wound care for hyperbarics. Return to clinic in 2 weeks as her schedule permits and will refer to hyperbarics.     Cindy Muñoz MD, MSc, FACS  3/18/2022  3:12 PM

## 2022-03-18 NOTE — TELEPHONE ENCOUNTER
Left message and call back number with Deondre Go in reference to referral placed today. Would like to verify that they received the referral and confirm if they call the patient to schedule an appointment.

## 2022-03-21 ENCOUNTER — TELEPHONE (OUTPATIENT)
Dept: BREAST CENTER | Age: 52
End: 2022-03-21

## 2022-03-21 NOTE — TELEPHONE ENCOUNTER
Notified by wound care that they will be given the patient a call today to get her scheduled. Patient will likely be scheduled next week.

## 2022-03-21 NOTE — TELEPHONE ENCOUNTER
RN contacted patient in regards to oil dressing request. [facility] doesn't provide that supply would have came through home care and since homecare isnt involved would have to find OTC location. Patient states she will call around and see where she can get them and if needs anything further from us will contact the office back. RN advise [facility] and [facility] are some locations that sometimes carry them. Patient verbalized understanding. RN routing message to  for information purposes.    Electronically signed by Shasha Pretty RN on 3/21/22 at 9:50 AM EDT

## 2022-03-30 ENCOUNTER — HOSPITAL ENCOUNTER (OUTPATIENT)
Dept: WOUND CARE | Age: 52
Discharge: HOME | End: 2022-03-30
Payer: COMMERCIAL

## 2022-03-30 ENCOUNTER — HOSPITAL ENCOUNTER (OUTPATIENT)
Dept: HYPERBARIC MEDICINE | Age: 52
Setting detail: THERAPIES SERIES
Discharge: HOME | End: 2022-03-30
Payer: COMMERCIAL

## 2022-03-30 VITALS
BODY MASS INDEX: 30.23 KG/M2 | RESPIRATION RATE: 18 BRPM | HEART RATE: 72 BPM | DIASTOLIC BLOOD PRESSURE: 82 MMHG | WEIGHT: 144 LBS | HEIGHT: 58 IN | SYSTOLIC BLOOD PRESSURE: 130 MMHG | TEMPERATURE: 96.1 F

## 2022-03-30 DIAGNOSIS — L59.8 RADIATION NECROSIS OF SKIN AND SUBCUTANEOUS: Chronic | ICD-10-CM
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
## 2022-03-30 DIAGNOSIS — T66.XXXS LATE EFFECT OF RADIATION: Chronic | ICD-10-CM

## 2022-03-30 DIAGNOSIS — L98.499 ULCER OF SKIN OF BREAST (HCC): Chronic | ICD-10-CM

## 2022-03-30 DIAGNOSIS — T81.30XA WOUND DEHISCENCE: Primary | ICD-10-CM

## 2022-03-30 PROCEDURE — 99203 OFFICE O/P NEW LOW 30 MIN: CPT

## 2022-03-30 PROCEDURE — 99203 OFFICE O/P NEW LOW 30 MIN: CPT | Performed by: SURGERY

## 2022-03-30 PROCEDURE — 99205 OFFICE O/P NEW HI 60 MIN: CPT | Performed by: SURGERY

## 2022-03-30 RX ORDER — BACITRACIN ZINC AND POLYMYXIN B SULFATE 500; 1000 [USP'U]/G; [USP'U]/G
OINTMENT TOPICAL ONCE
Status: CANCELLED | OUTPATIENT
Start: 2022-03-30 | End: 2022-03-30

## 2022-03-30 RX ORDER — LIDOCAINE HYDROCHLORIDE 20 MG/ML
JELLY TOPICAL ONCE
Status: CANCELLED | OUTPATIENT
Start: 2022-03-30 | End: 2022-03-30

## 2022-03-30 RX ORDER — GINSENG 100 MG
CAPSULE ORAL ONCE
Status: CANCELLED | OUTPATIENT
Start: 2022-03-30 | End: 2022-03-30

## 2022-03-30 RX ORDER — BETAMETHASONE DIPROPIONATE 0.05 %
OINTMENT (GRAM) TOPICAL ONCE
Status: CANCELLED | OUTPATIENT
Start: 2022-03-30 | End: 2022-03-30

## 2022-03-30 RX ORDER — LIDOCAINE 50 MG/G
OINTMENT TOPICAL ONCE
Status: CANCELLED | OUTPATIENT
Start: 2022-03-30 | End: 2022-03-30

## 2022-03-30 RX ORDER — BACITRACIN, NEOMYCIN, POLYMYXIN B 400; 3.5; 5 [USP'U]/G; MG/G; [USP'U]/G
OINTMENT TOPICAL ONCE
Status: CANCELLED | OUTPATIENT
Start: 2022-03-30 | End: 2022-03-30

## 2022-03-30 RX ORDER — LIDOCAINE 40 MG/G
CREAM TOPICAL ONCE
Status: CANCELLED | OUTPATIENT
Start: 2022-03-30 | End: 2022-03-30

## 2022-03-30 RX ORDER — GENTAMICIN SULFATE 1 MG/G
OINTMENT TOPICAL ONCE
Status: CANCELLED | OUTPATIENT
Start: 2022-03-30 | End: 2022-03-30

## 2022-03-30 RX ORDER — CLOBETASOL PROPIONATE 0.5 MG/G
OINTMENT TOPICAL ONCE
Status: CANCELLED | OUTPATIENT
Start: 2022-03-30 | End: 2022-03-30

## 2022-03-30 RX ORDER — LIDOCAINE HYDROCHLORIDE 40 MG/ML
SOLUTION TOPICAL ONCE
Status: COMPLETED | OUTPATIENT
Start: 2022-03-30 | End: 2022-03-30

## 2022-03-30 RX ORDER — LIDOCAINE HYDROCHLORIDE 40 MG/ML
SOLUTION TOPICAL ONCE
Status: CANCELLED | OUTPATIENT
Start: 2022-03-30 | End: 2022-03-30

## 2022-03-30 RX ADMIN — LIDOCAINE HYDROCHLORIDE: 40 SOLUTION TOPICAL at 13:25

## 2022-03-30 NOTE — PROGRESS NOTES
HBOT eval for the tx of late effect of radiation completed. Education and orientation materials were provided and explained to the patient with all questions answered. tcpO2 with O2 challenge completed. Paper copy of lab work and chest xray orders given to shannan thakur initiated.

## 2022-03-30 NOTE — PROGRESS NOTES
Procedure:  Transcutaneous Oxygen Studies (TcPO2)    ASSESSMENT:    Transcutaneous Oxygen Studies (TcPO2) were performed at the periwound margins on room air for 15 minutes. The results does not indicate local tissue hypoxia. The was a significant improvement in TCPO2 with oxygen challenge, patient breathing 100% oxygen by non-rebreather mask at 15 lpm for a period of 10 minutes. *See scanned copy of the Test Result Print-Out and a description of site locations in this patients Media Tab. 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 XXXS               PLAN:  1. Patient is a candidate for Hyperbaric Oxygen Therapy.       Jacklyn Green MD  4:07 PM    \

## 2022-03-30 NOTE — PROGRESS NOTES
[address]:     [facility] [address] [facility], [address] [place] [address]  p: [phone] f: [phone]     Ordering Center:     [address]  [address] [address]  [phone]  WOUND CARE Dept: [facility] [phone]    Patient Information:      Darlin Rae  [address]  [address] [address]   I have attempted without success to contact this patient by phone for Preadmission Testing phone assessment. Message left for pt to return call. : 1970  AGE: 54 y.o. GENDER: female   EPISODE DATE: 3/30/2022    Insurance:      PRIMARY INSURANCE:  Plan: BCBS OUT OF STATE  Coverage: BCBS  Effective Date: 2018  Group Number: [unfilled]  Subscriber Number: [id number] - (To BCBS)    Payor/Plan Subscr  Sex Relation Sub. Ins. ID Effective Group Num   1. [address] M 1970 Female Self [id number]                                   [address]   2.  [address] [facility] [address] [facility] 1968 Male Spouse [id number]                                   [address]       Patient Wound Information:      Problem List Items Addressed This Visit     None          WOUNDS REQUIRING DRESSING SUPPLIES:     Wound 22 Breast Right #1 (Active)   Wound Image   22 1323   Wound Length (cm) 0.5 cm 22 1323   Wound Width (cm) 1.3 cm 22 1323   Wound Depth (cm) 0.3 cm 22 1323   Wound Surface Area (cm^2) 0.65 cm^2 22 1323   Wound Volume (cm^3) 0.195 cm^3 22 1323   Undermining Starts ___ O'Clock 3 22 1323   Undermining Ends___ O'Clock 9 22 1323   Undermining Maxium Distance (cm) 0.5 @ 6 22 1323   Wound Assessment Slough;Mallard/red 22 1323   Drainage Amount Moderate 22 1323   Drainage Description Serosanguinous 22 1323   Odor Mild 22 1323   Ling-wound Assessment Intact 22 1323   Number of days: 0     Incision 07/06/21 Breast Right;Medial (Active)   Number of days: 266       Incision 02/08/22 Chest Right (Active)   Number of days: 50       Supplies Requested :      WOUND #: 1   PRIMARY DRESSING:  Alginate rope   Cover and Secure with: ABD pad     FREQUENCY OF DRESSING CHANGES:  Daily         ADDITIONAL ITEMS:  [] Gloves Small  [x] Gloves Medium [] Gloves Large [] Gloves XLarge  [] Tape 1\" [x] Tape 2\" [] Tape 3\"  [] Medipore Tape  [x] Saline  [] Skin Prep   [] Adhesive Remover   [] Cotton Tip Applicators   [] Other:    Patient Wound(s) Debrided: [] Yes if yes please add date    [x] No    Debribement Type: n/a    Is the patient currently on an antibiotic for their Wound(s): [] Yes if yes please add name and dose   [x] No    Patient currently being seen by Home Health: [] Yes   [x] No    Duration for needed supplies:  []15  []30  []60  [x]90 Days    Electronically signed by [place] Bonilla RN on 3/30/2022 at 1:49 PM     Provider Information:      [facility] NAME:Dr. Emilio Patino    NPI: [id number]

## 2022-03-30 NOTE — PROGRESS NOTES
Hyperbaric Oxygen Therapy   Patient Orientation      NAME: Axel HOYOS [facility]  MEDICAL RECORD NUMBER:  [id number]  AGE: 54 y.o. GENDER: female  : 1970  EPISODE DATE:  3/30/2022    HBO Orientation Completed with:  Patient         INTRODUCTION   Welcome to the [address] [facility],[address]. This guide will assist in answering any questions which you might have concerning your scheduled hyperbaric oxygen treatment and if appropriate, any wound care you might need. During your stay with us you will hear your treatment called a dive. This is just a nickname given to the procedure and does not refer to being in water. You will only be exposed to air pressure changes during your treatments. HYPERBARIC OXYGEN THERAPY  Hyperbaric oxygen treatment is a means of providing additional oxygen to your body tissues. By increasing the oxygen in the tissues, healing is enhanced. Two important effects on difficult wounds are first, to speed new microscopic blood vessel growth into the wound and second, to improve the ability of your white blood cells to kill germs. It is important to know that hyperbaric oxygen is an additional (adjunctive) therapy in addition to the current medical or surgical care you are receiving. It is also essential that you understand that this is not a cure-all but a part of your total medical care. THE STAFF  The [facility] staff consists of fully trained physicians, nursing staff, and chamber operators. There will always be a physician, as well as other staff members with you in the department while you are having your hyperbaric oxygen therapy treatment. Please feel free to ask for help from any of the staff members while you are here. THE CHAMBER  The hyperbaric chamber located at the [address] [place] [address] Road is a monoplace, seamless acrylic pressure chamber designed to treat one patient at a time. You will be lying down with your head slightly elevated for your treatments.   A communication system allows you to speak with the , family members or the physician. You will also be able to watch and listen to television during treatment. The monoplace chamber administers 100% oxygen at a physician prescribed pressure. Because you will be in an oxygen environment, a detailed list of safety precautions and guidelines will be strictly enforced for your safety. TREATMENT SCHEDULE  You will need to arrange for your own transportation. If there is difficulty, we will try to assist in making the necessary arrangements with an appropriate agency. Hyperbaric treatments are given daily, Monday through Friday. Each treatment will last almost two (2) hours. Be prepared to spend approximately three (3) hours at our facility. This allows time for preparing you for your treatment dive and the actual time in the chamber. It is essential that you try to make every scheduled treatment dive possible so that the effects of the hyperbaric oxygen will continue. Any long interruption could cause the healing enhancement to slow or even stop. If at any time you have chills, fever, nausea, vomiting, diarrhea or any problem with your glucose levels, please inform the physician or the nurse. You will be assessed to see if you should be in the chamber that day. You will receive a complete briefing by a staff member. Necessary forms and informed consents (permits) will need to be signed before treatments begin. You will also be given a tour of the area . VISITORS  Visitors are welcome and we encourage patients to bring their families. We ask that once your family has seen the facility that they remain in the waiting room to ensure your privacy, and the privacy of the other patients. Visitors are not allowed in the treatment area unless their presence is needed by the physician. Again, we welcome you to our facility.   Please let us know if there is anything that we can do to assist you during your time with us. GENERAL INFORMATION REVIEWED: Yes      You will need to arrive 30 minutes prior to your visit. Please call us if you are not feeling well the day of your visit, so that we can determine if it will be necessary to reschedule your treatment (before you arrive). Each visit will begin with a staff member asking you a series of questions. These questions may sound repetitive, but please understand, for safety reasons, that we must ask the same questions each and every visit. Only chamber approved clothing may be worn during treatment, therefore special clothing is provided for you at each visit. For safety reasons, chamber operators are required to ask about prohibited items each and every visit. Vital signs (blood pressure, temperature pulse, and respirations) will be taken both before and after each visit. PRESSURE CHANGES INFORMATION REVIEWED: Yes   As the pressure within the chamber changes, you may notice changes to your ears, sinuses or lungs. For example, your ears may \"pop\" as if you are traveling on an airplane or scuba diving. Please notify the chamber  if you are experiencing pain in your ears, sinuses or lungs during your treatment \"dive. \"      To help prevent pain or injury to your ears, you will be taught by the staff and will practice thoroughly a procedure, known as the Valsalva maneuver, as well as other techniques prior to your first treatment \"dive. \"  Although the pressure in the chamber is not felt on the body, you do feel changes in the ears. The eardrum is normally flat while you are at ground level. Pressure changes in the atmosphere around you will usually be felt in the ears. The eardrum tends to bow inwardly and unless you take positive action, fullness or pain will be felt.   In order to avoid this, you will be taught how to force air into the middle ear during the few minutes that it takes to pressurize the chamber. To help prevent pain or injury to your lungs, please make sure to notify a staff member if you have any upper respiratory infection and/or congestion. It is very important not to hold your breath during your treatment \"dive\", just breath normally. PATIENTS WITH DIABETES ONLY NA  If you have diabetes your blood sugar level will be tested before each and every treatment. If your blood sugar level is too low, we will attempt to help you raise it by providing a diabetic nutritional shake. We will retest your blood sugar shortly thereafter. If your blood sugar level is still low, we may not be able to provide a treatment \"dive\" that day. If your blood sugar levels is too high, we also may not be able to provide a treatment \"dive\" that day. If your blood sugar level continues to be too high or too low, not allowing you to continue with the hyperbaric treatments, you will need to contact your primary physician to help manage your blood sugar more effectively. ABOUT YOUR TREATMENT INFORMATION REVIEWED: Yes   If you are feeling nervous or anxious about these treatments, please let a staff member know. We are here to help you. Before each and every treatment, please let us know of any changes regarding:    your medication, especially cancer medication  any possibility of being pregnant  any new implants or devices    Temporary vision changes may occur during hyperbaric oxygen therapy. Therefore, it is recommended that you not change eyeglass prescriptions during therapy. Changes that occur during therapy should return to pre-treatment baseline within several weeks of the conclusion of therapy.  In the rare instance that vision has not returned to the pre-treatment baseline; it is recommended that you schedule an eye exam with your Opthalmalogist.    It is very important to make the clinical staff aware if you have ever had a collapsed lung    ITEMS TO AVOID INFORMATION REVIEWED: Yes Smoking has a negative effect on treatment and may diminish the benefits you may receive. Smoking within 2 hours prior to your treatment poses additional risk and should be avoided completely. Drinking alcohol or using illicit drugs may also have a negative effect on your treatment and should be avoided. Drinking carbonated beverages such as soda pop within 1 hour of your treatment could cause pains in the stomach during the treatment. Caffeinated beverages or foods containing caffeine should be avoided before your treatment. Please let us know if we can assist you with seeking help to stop smoking, drinking or using recreational drugs. PROHIBITED ITEMS INFORMATION REVIEWED: Yes   No wigs, hair spray, hair oils, hair ornaments, creams, lotions, make-up, after shave, perfumes, colognes, chap stick, lip balms, mustache waxes, petroleum products (e.g. Vaseline), baby oil, deodorant or ointments  No nail polish    No synthetic clothing  No nylon hose, underwear, panty hose or bra  No food, gum or candy  No jewelry  No smoking materials such as lighter, cigarettes or matches  No reading material  No hearing aids, non-fixed dentures  No Hard (non-gas permeable) contact lenses  Most dressings are approved to wear into the hyperbaric chamber. Please advise the hyperbaric staff of any new dressings applied to your wound to ensure the new dressings are compatible with hyperbaric oxygen treatments. No alcohol preps  No heat packs  No street clothes or shoes  No cell phones or other electronics  If it was not a part of you when you were born into this world, if it was not provided by the chamber , then it cannot go into the chamber with you.         Electronically signed by Trudy Flores LPN on 6/13/0919 at 4:01 PM

## 2022-03-31 NOTE — PROGRESS NOTES
Wound Healing Center /Hyperbarics   History and Physical/Consultation  Vascular    Referring Physician : MD Sharon Montalvo [facility]  MEDICAL RECORD NUMBER:  [id number]  AGE: 54 y.o. GENDER: female  : 1970  EPISODE DATE:  3/30/2022  Subjective:     Chief Complaint   Patient presents with    Wound Check     Right breast       HISTORY of PRESENT ILLNESS HPI     Dedra Denton is a 54 y.o. female who presents today for wound/ulcer evaluation. History of Wound Context:  Patient has had a wound of her right chest since 2022. She had her mastectomy wound dehisce. She had her first breast cancer at age 30.  She had undergone a lumpectomy with reexcision for margins and radiation at that time first at [facility] and then up at [facility]. Pawan Robbins was estrogen progesterone positive and HER-2 negative and was placed on tamoxifen then for 5 years. She had a recurrence in . It was estrogen progesterone negative and HER-2 positive.  She underwent an MRI of her breast and the tumor was only 1.6 cm in size.  Due to the recurrence she also underwent CAT scans and bone scans which were negative.  Due to the tumor being less than 2 cm in size we took her to the OR and she elected for bilateral mastectomy.  [place] lymph node was attempted but it turned into a right modified radical mastectomy. Keisha Lagos final pathology showed that she had multiple lymph nodes that were positive.  So she went into surgery and a stage Ia tumor and came out of surgery as a stage IIIa. She underwent PET scan in October which showed postoperative change.      At her second postoperative appointment she had a lot of drainage through the wound despite the FABIO drain that had been left.  And she dehisced in the middle to lateral aspect of the wound.  She was treated with wet-to-dry dressings.  When she got off her chemotherapy and onto immunotherapy she went back to surgery in 2022 for debridement and closure.  The wound was able to be closed but it had a little bit of tension medially.  She developed a seroma.      She has had a wound vac since ~ 2/2022. All of her sutures have been removed. On their initial visit to the wound healing center, 3/30/22  the patient has noted that the wound has been stable. The patient has not had similar previous wounds in the past.      Pt is not on abx at time of initial visit.     3/30/22  · Culture done  · aquacell packing  · Stop wound vac  · Screen for hbo  · Labs and cxr ordered    Wound/Ulcer Pain Timing/Severity: none  Quality of pain: N/A  Severity:  0 / 10   Modifying Factors: None  Associated Signs/Symptoms: drainage    Ulcer Identification:  Ulcer Type: non-healing surgical, soft tissue radionecrosis, compromised skin graft/flap and necrosis radiation  Contributing Factors: edema and decreased tissue oxygenation    Diabetic/Pressure/Non Pressure Ulcers only:  Ulcer: Non-Pressure ulcer, fat layer exposed  Wound: Wound dehiscence        PAST MEDICAL HISTORY      Diagnosis Date    Cancer [facility]) 2008    right breast    Late effect of radiation 3/30/2022    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 LUMPECTOMY Right     CARPAL TUNNEL RELEASE Right     CHEST WALL RESECTION Right 2/8/2022    RIGHT CHEST WALL COMPLEX WOUND CLOSURE AND WOUND VAC performed by Yadira Caldwell MD at [facility] PARTIAL HYSTERECTOMY      PORT SURGERY Left 7/6/2021    LEFT SUBCLAVIAN MEDI PORT INSERTION performed by [facility]adira Caldwell MD at [facility]       Family History   Problem Relation Age of Onset    Cancer Mother         lung     Social History     Tobacco Use    Smoking status: Former Smoker Packs/day: 0.50    Smokeless tobacco: Former User     Quit date: 3/9/2022    Tobacco comment: 1/2 pack or less    Vaping Use    Vaping Use: Never used   Substance Use Topics    Alcohol use: Yes     Comment: occasionaly    Drug use: Never     Allergies   Allergen Reactions    Meperidine     Morphine     Sulfa Antibiotics Nausea And Vomiting and Other (See Comments)     fever     Current Outpatient Medications on File Prior to Encounter   Medication Sig Dispense Refill    BIOTIN PO Take by mouth      NONFORMULARY Inject into the muscle every 21 days Chemo cocktail:  Pertuzumab, Trastuzumab, Hyaluronidase  Goes to [facility] for the injection       No current facility-administered medications on file prior to encounter.        REVIEW OF SYSTEMS   ROS : All others Negative if blank [], Positive if [x]  General Urinary   [] Fevers [] Hematuria   [] Chills [] Dysuria   [] Weight Loss Vascular   Skin [] Claudication   [x] Tissue Loss [] Rest Pain   Eyes Neurologic   [] Wears Glasses/Contacts [] Stroke/TIA   [] Vision Changes [] Focal weakness   Respiratory [] Slurred Speech    [] Shortness of breath ENT   Cardiovascular [] Difficulty swallowing   [] Chest Pain Gastrointestinal   [] Shortness of breath with exertion [] Abdominal Pain    [] Melena       [] Hematochezia               Objective:    /82   Pulse 72   Temp 96.1 °F (35.6 °C) (Tympanic)   Resp 18   Ht 4' 10\" (1.473 m)   Wt 144 lb (65.3 kg)   BMI 30.10 kg/m²   Wt Readings from Last 3 Encounters:   03/30/22 144 lb (65.3 kg)   03/18/22 147 lb (66.7 kg)   03/04/22 145 lb (65.8 kg)       PHYSICAL EXAM  CONSTITUTIONAL:   Awake, alert, cooperative   PSYCHIATRIC :  Oriented to time, place and person      normal insight to disease process  ENT:  External ears and nose without lesions    Hearing deficits is not noted  NECK: Supple, symmetrical, trachea midline    Thyroid goiter not appreciated   R chest   - open wound  - tissue if firm  LUNGS:  No increased work of breathing                  Clear to auscultation bilaterally   CARDIOVASCULAR:  regular rate and rhythm   ABDOMEN:  soft, non-distended, non-tender    Hernias is not noted   Aorta is not palpable   EXTREMITIES:   R UE Edema is not noted  L UE Edema is not noted  R LE Edema is not noted  L LE Edema is not noted    Assessment:     Problem List Items Addressed This Visit     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)    Ulcer of skin of breast (HCC) (Chronic)    Relevant Orders    Initiate Outpatient Wound Care Protocol    Late effect of radiation (Chronic)    Relevant Orders    Provider Information    CBC WITH AUTO DIFFERENTIAL    COMPREHENSIVE METABOLIC PANEL    PREALBUMIN    HEMOGLOBIN A1C    Sedimentation Rate    C-REACTIVE PROTEIN    XR CHEST (2 VW)    Initiate Outpatient Wound Care Protocol    Wound dehiscence - Primary    Relevant Orders    Initiate Outpatient Wound Care Protocol          Pre Debridement Measurements:  Are located in the [facility]  Documentation Flow Sheet  Post Debridement Measurements:  Wound/Ulcer Descriptions are Pre Debridement except measurements:     Wound 03/30/22 Breast Right #1 (Active)   Wound Image   03/30/22 1323   Wound Length (cm) 0.5 cm 03/30/22 1323   Wound Width (cm) 1.3 cm 03/30/22 1323   Wound Depth (cm) 0.3 cm 03/30/22 1323   Wound Surface Area (cm^2) 0.65 cm^2 03/30/22 1323   Wound Volume (cm^3) 0.195 cm^3 03/30/22 1323   Undermining Starts ___ O'Clock 3 03/30/22 1323   Undermining Ends___ O'Clock 9 03/30/22 1323   Undermining Maxium Distance (cm) 0.5 @ 6 03/30/22 1323   Wound Assessment Slough;[place]/red 03/30/22 1323   Drainage Amount Moderate 03/30/22 1323   Drainage Description Serosanguinous 03/30/22 1323   Odor Mild 03/30/22 1323   Ling-wound Assessment Intact 03/30/22 1323   Number of days: 0     Incision 07/06/21 Breast Right;Medial (Active)   Number of days: 267       Incision 02/08/22 Chest Right (Active)   Number of days: 50     No debridement, culture done  Plan:     Pt is not currently a smoker   - Discussed relationship of smoking and negative affects on wound healing   - Emphasized importance of tobacco avoidace/cessation    In my professional opinion and based off the information that is available at this time this patient has appropriate indication for HBO Therapy: Yes    Treatment Note please see attached Discharge Instructions    Written patient dismissal instructions given to patient and signed by patient or POA. Discharge Instructions       Visit Discharge/Physician Orders    Discharge condition: Stable    Assessment of pain at discharge: mild    Anesthetic used: lido 4%    Discharge to: Home    Left via:Private automobile    Accompanied by: accompanied by self    ECF/HHA: [facility]     Dressing Orders: To right breast wound: Cleanse with normal saline, pack loosely with aquacel rope, cover and secure with dry dressing. Change daily. Stop wound vac. Remove aquacel dry, do not wet to remove. Treatment Orders: Eat a diet high in protein and vitamin C. Take a multiple vitamin daily unless contraindicated. Do not get wet. Screening for hyperbaric oxygen treatment    [address] [place] [address],[address] followup visit  1 week_________________________  (Please note your next appointment above and if you are unable to keep, kindly give a 24 hour notice. Thank you.)    Physician signature:__________________________      If you experience any of the following, please call the [address] during business hours:    * Increase in Pain  * Temperature over 101  * Increase in drainage from your wound  * Drainage with a foul odor  * Bleeding  * Increase in swelling  * Need for compression bandage changes due to slippage, breakthrough drainage. If you need medical attention outside of the business hours of the [address] please contact your PCP or go to the nearest emergency room.         Electronically signed by Eleni Padilla Ovi Oro MD on 3/30/2022 at 10:23 PM

## 2022-04-01 ENCOUNTER — HOSPITAL ENCOUNTER (OUTPATIENT)
Age: 52
Discharge: HOME | End: 2022-04-03
Payer: COMMERCIAL

## 2022-04-01 ENCOUNTER — HOSPITAL ENCOUNTER (OUTPATIENT)
Dept: GENERAL RADIOLOGY | Age: 52
Discharge: HOME | End: 2022-04-03
Payer: COMMERCIAL

## 2022-04-01 ENCOUNTER — HOSPITAL ENCOUNTER (OUTPATIENT)
Age: 52
Discharge: HOME | End: 2022-04-01
Payer: COMMERCIAL

## 2022-04-01 LAB
ALBUMIN SERPL-MCNC: 4.6 G/DL (ref 3.5–5.2)
ALP BLD-CCNC: 128 U/L (ref 35–104)
ALT SERPL-CCNC: 28 U/L (ref 0–32)
ANION GAP SERPL CALCULATED.3IONS-SCNC: 13 MMOL/L (ref 7–16)
AST SERPL-CCNC: 32 U/L (ref 0–31)
BASOPHILS ABSOLUTE: 0.05 E9/L (ref 0–0.2)
BASOPHILS RELATIVE PERCENT: 0.8 % (ref 0–2)
BILIRUB SERPL-MCNC: 0.3 MG/DL (ref 0–1.2)
BUN BLDV-MCNC: 16 MG/DL (ref 6–20)
C-REACTIVE PROTEIN: 0.3 MG/DL (ref 0–0.4)
CALCIUM SERPL-MCNC: 9.6 MG/DL (ref 8.6–10.2)
CHLORIDE BLD-SCNC: 102 MMOL/L (ref 98–107)
CO2: 23 MMOL/L (ref 22–29)
CREAT SERPL-MCNC: 0.8 MG/DL (ref 0.5–1)
EOSINOPHILS ABSOLUTE: 0.15 E9/L (ref 0.05–0.5)
EOSINOPHILS RELATIVE PERCENT: 2.3 % (ref 0–6)
GFR AFRICAN AMERICAN: >60
GFR NON-AFRICAN AMERICAN: >60 ML/MIN/1.73
GLUCOSE BLD-MCNC: 133 MG/DL (ref 74–99)
HBA1C MFR BLD: 5.4 % (ref 4–5.6)
HCT VFR BLD CALC: 35.7 % (ref 34–48)
HEMOGLOBIN: 12.4 G/DL (ref 11.5–15.5)
IMMATURE GRANULOCYTES #: 0.05 E9/L
IMMATURE GRANULOCYTES %: 0.8 % (ref 0–5)
LYMPHOCYTES ABSOLUTE: 2.15 E9/L (ref 1.5–4)
LYMPHOCYTES RELATIVE PERCENT: 32.4 % (ref 20–42)
MCH RBC QN AUTO: 35.5 PG (ref 26–35)
MCHC RBC AUTO-ENTMCNC: 34.7 % (ref 32–34.5)
MCV RBC AUTO: 102.3 FL (ref 80–99.9)
MONOCYTES ABSOLUTE: 0.36 E9/L (ref 0.1–0.95)
MONOCYTES RELATIVE PERCENT: 5.4 % (ref 2–12)
NEUTROPHILS ABSOLUTE: 3.87 E9/L (ref 1.8–7.3)
NEUTROPHILS RELATIVE PERCENT: 58.3 % (ref 43–80)
PDW BLD-RTO: 11.9 FL (ref 11.5–15)
PLATELET # BLD: 187 E9/L (ref 130–450)
PMV BLD AUTO: 8.6 FL (ref 7–12)
POTASSIUM SERPL-SCNC: 3.9 MMOL/L (ref 3.5–5)
PREALBUMIN: 27 MG/DL (ref 20–40)
RBC # BLD: 3.49 E12/L (ref 3.5–5.5)
SEDIMENTATION RATE, ERYTHROCYTE: 10 MM/HR (ref 0–20)
SODIUM BLD-SCNC: 138 MMOL/L (ref 132–146)
TOTAL PROTEIN: 7.3 G/DL (ref 6.4–8.3)
WBC # BLD: 6.6 E9/L (ref 4.5–11.5)

## 2022-04-01 PROCEDURE — 36415 COLL VENOUS BLD VENIPUNCTURE: CPT

## 2022-04-01 PROCEDURE — 80053 COMPREHEN METABOLIC PANEL: CPT

## 2022-04-01 PROCEDURE — 86140 C-REACTIVE PROTEIN: CPT

## 2022-04-01 PROCEDURE — 85651 RBC SED RATE NONAUTOMATED: CPT

## 2022-04-01 PROCEDURE — 83036 HEMOGLOBIN GLYCOSYLATED A1C: CPT

## 2022-04-01 PROCEDURE — 84134 ASSAY OF PREALBUMIN: CPT

## 2022-04-01 PROCEDURE — 85025 COMPLETE CBC W/AUTO DIFF WBC: CPT

## 2022-04-01 PROCEDURE — 71046 X-RAY EXAM CHEST 2 VIEWS: CPT

## 2022-04-06 ENCOUNTER — HOSPITAL ENCOUNTER (OUTPATIENT)
Dept: WOUND CARE | Age: 52
Discharge: HOME | End: 2022-04-06
Payer: COMMERCIAL

## 2022-04-06 VITALS
WEIGHT: 144 LBS | RESPIRATION RATE: 18 BRPM | HEART RATE: 89 BPM | TEMPERATURE: 96.7 F | SYSTOLIC BLOOD PRESSURE: 130 MMHG | BODY MASS INDEX: 30.1 KG/M2 | DIASTOLIC BLOOD PRESSURE: 68 MMHG

## 2022-04-06 DIAGNOSIS — L59.8 RADIATION NECROSIS OF SKIN AND SUBCUTANEOUS: Chronic | ICD-10-CM

## 2022-04-06 DIAGNOSIS — T81.30XA WOUND DEHISCENCE: ICD-10-CM

## 2022-04-06 DIAGNOSIS — T66.XXXS LATE EFFECT OF RADIATION: ICD-10-CM
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
## 2022-04-06 DIAGNOSIS — L98.499 ULCER OF SKIN OF BREAST (HCC): Primary | ICD-10-CM

## 2022-04-06 PROCEDURE — 97597 DBRDMT OPN WND 1ST 20 CM/<: CPT | Performed by: SURGERY

## 2022-04-06 PROCEDURE — 6370000000 HC RX 637 (ALT 250 FOR IP): Performed by: SURGERY

## 2022-04-06 PROCEDURE — 97597 DBRDMT OPN WND 1ST 20 CM/<: CPT

## 2022-04-06 RX ORDER — LIDOCAINE HYDROCHLORIDE 20 MG/ML
JELLY TOPICAL ONCE
Status: CANCELLED | OUTPATIENT
Start: 2022-04-06 | End: 2022-04-06

## 2022-04-06 RX ORDER — BETAMETHASONE DIPROPIONATE 0.05 %
OINTMENT (GRAM) TOPICAL ONCE
Status: CANCELLED | OUTPATIENT
Start: 2022-04-06 | End: 2022-04-06

## 2022-04-06 RX ORDER — BACITRACIN, NEOMYCIN, POLYMYXIN B 400; 3.5; 5 [USP'U]/G; MG/G; [USP'U]/G
OINTMENT TOPICAL ONCE
Status: CANCELLED | OUTPATIENT
Start: 2022-04-06 | End: 2022-04-06

## 2022-04-06 RX ORDER — LIDOCAINE 50 MG/G
OINTMENT TOPICAL ONCE
Status: CANCELLED | OUTPATIENT
Start: 2022-04-06 | End: 2022-04-06

## 2022-04-06 RX ORDER — BACITRACIN ZINC AND POLYMYXIN B SULFATE 500; 1000 [USP'U]/G; [USP'U]/G
OINTMENT TOPICAL ONCE
Status: CANCELLED | OUTPATIENT
Start: 2022-04-06 | End: 2022-04-06

## 2022-04-06 RX ORDER — LIDOCAINE 40 MG/G
CREAM TOPICAL ONCE
Status: CANCELLED | OUTPATIENT
Start: 2022-04-06 | End: 2022-04-06

## 2022-04-06 RX ORDER — LIDOCAINE HYDROCHLORIDE 40 MG/ML
SOLUTION TOPICAL ONCE
Status: CANCELLED | OUTPATIENT
Start: 2022-04-06 | End: 2022-04-06

## 2022-04-06 RX ORDER — GINSENG 100 MG
CAPSULE ORAL ONCE
Status: CANCELLED | OUTPATIENT
Start: 2022-04-06 | End: 2022-04-06

## 2022-04-06 RX ORDER — M-VIT,TX,IRON,MINS/CALC/FOLIC 27MG-0.4MG
1 TABLET ORAL DAILY
COMMUNITY

## 2022-04-06 RX ORDER — CLOBETASOL PROPIONATE 0.5 MG/G
OINTMENT TOPICAL ONCE
Status: CANCELLED | OUTPATIENT
Start: 2022-04-06 | End: 2022-04-06

## 2022-04-06 RX ORDER — LIDOCAINE HYDROCHLORIDE 40 MG/ML
SOLUTION TOPICAL ONCE
Status: COMPLETED | OUTPATIENT
Start: 2022-04-06 | End: 2022-04-06

## 2022-04-06 RX ORDER — GENTAMICIN SULFATE 1 MG/G
OINTMENT TOPICAL ONCE
Status: CANCELLED | OUTPATIENT
Start: 2022-04-06 | End: 2022-04-06

## 2022-04-06 RX ADMIN — LIDOCAINE HYDROCHLORIDE 10 ML: 40 SOLUTION TOPICAL at 14:01

## 2022-04-06 NOTE — PLAN OF CARE
Problem: Smoking cessation:  Goal: Ability to formulate a plan to maintain a tobacco-free life will be supported  Description: Ability to formulate a plan to maintain a tobacco-free life will be supported  Outcome: Met This Shift     Problem: Wound:  Goal: Will show signs of wound healing; wound closure and no evidence of infection  Description: Will show signs of wound healing; wound closure and no evidence of infection  Outcome: Ongoing

## 2022-04-06 NOTE — PROGRESS NOTES
Wound Healing Center Followup Visit Note    Referring Physician : MD Sylvia Wong [facility]  MEDICAL RECORD NUMBER:  [id number]  AGE: 54 y.o. GENDER: female  : 1970  EPISODE DATE:  2022    Subjective:     Chief Complaint   Patient presents with    Wound Check     Right breast      HISTORY of PRESENT ILLNESS HPI   Burak Marc is a 54 y.o. female who presents today in regards to follow up evaluation and treatment of wound/ulcer. That patient's past medical, family and social hx were reviewed and changes were made if present. History of Wound Context:  Patient has had a wound of her right chest since 2022. She had her mastectomy wound dehisce. She had her first breast cancer at age 30.  She had undergone a lumpectomy with reexcision for margins and radiation at that time first at [place] and then up at [address] [place] [address]. Alla Cole was estrogen progesterone positive and HER-2 negative and was placed on tamoxifen then for 5 years. She had a recurrence in . It was estrogen progesterone negative and HER-2 positive.  She underwent an MRI of her breast and the tumor was only 1.6 cm in size.  Due to the recurrence she also underwent CAT scans and bone scans which were negative.  Due to the tumor being less than 2 cm in size we took her to the OR and she elected for bilateral mastectomy.  [place] lymph node was attempted but it turned into a right modified radical mastectomy. Pratima Sams final pathology showed that she had multiple lymph nodes that were positive.  So she went into surgery and a stage Ia tumor and came out of surgery as a stage IIIa. She underwent PET scan in October which showed postoperative change.      At her second postoperative appointment she had a lot of drainage through the wound despite the FABIO drain that had been left.  And she dehisced in the middle to lateral aspect of the wound.  She was treated with wet-to-dry dressings.  When she got off her chemotherapy and onto immunotherapy she went back to surgery in 2/2022 for debridement and closure.  The wound was able to be closed but it had a little bit of tension medially.  She developed a seroma.      She has had a wound vac since ~ 2/2022. All of her sutures have been removed. On their initial visit to the wound healing center, 3/30/22  the patient has noted that the wound has been stable. The patient has not had similar previous wounds in the past.      Pt is not on abx at time of initial visit.     3/30/22  · Culture done  · aquacell packing  · Stop wound vac  · Screen for hbo  · Labs and cxr ordered   4/6/22  · Wound improved     Wound/Ulcer Pain Timing/Severity: none  Quality of pain: N/A  Severity:  0 / 10   Modifying Factors: None  Associated Signs/Symptoms: drainage    Ulcer Identification:  Ulcer Type: non-healing surgical, soft tissue radionecrosis, compromised skin graft/flap and necrosis radiation  Contributing Factors: edema and decreased tissue oxygenation    Diabetic/Pressure/Non Pressure Ulcers only:  Ulcer: Non-Pressure ulcer, fat layer exposed  Wound: Wound dehiscence        PAST MEDICAL HISTORY      Diagnosis Date    Cancer [facility]) 2008    right breast    Late effect of radiation 3/30/2022    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 LUMPECTOMY Right     CARPAL TUNNEL RELEASE Right     CHEST WALL RESECTION Right 2/8/2022    RIGHT CHEST WALL COMPLEX WOUND CLOSURE AND WOUND VAC performed by Digna Mejia MD at [facility] Left 7/6/2021    LEFT SUBCLAVIAN MEDI PORT INSERTION performed by Digna Mejia MD at [facility]       Family History   Problem Relation Age of Onset    Cancer Mother         lung     Social History     Tobacco Use    Smoking status: Former Smoker     Packs/day: 0.50    Smokeless tobacco: Former User     Quit date: 3/9/2022    Tobacco comment: 1/2 pack or less    Vaping Use    Vaping Use: Never used   Substance Use Topics    Alcohol use: Yes     Comment: occasionaly    Drug use: Never     Allergies   Allergen Reactions    Meperidine     Morphine     Sulfa Antibiotics Nausea And Vomiting and Other (See Comments)     fever     Current Outpatient Medications on File Prior to Encounter   Medication Sig Dispense Refill    Multiple Vitamins-Minerals (THERAPEUTIC MULTIVITAMIN-MINERALS) tablet Take 1 tablet by mouth daily      BIOTIN PO Take by mouth      NONFORMULARY Inject into the muscle every 21 days Chemo cocktail:  Pertuzumab, Trastuzumab, Hyaluronidase  Goes to [facility] for the injection       No current facility-administered medications on file prior to encounter.        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)    Ulcer of skin of breast (HCC) - Primary (Chronic)    Relevant Orders    Initiate Outpatient Wound Care Protocol    Late effect of radiation (Chronic)    Relevant Orders    Initiate Outpatient Wound Care Protocol    Wound dehiscence    Relevant Orders    Initiate Outpatient Wound Care Protocol          Pre Debridement Measurements:  Are located in the Wound/Ulcer Documentation Flow Sheet  Post Debridement Measurements:  Wound/Ulcer Descriptions are Pre Debridement except measurements:     Wound 03/30/22 Breast Right #1 (Active)   Wound Image   03/30/22 1323   Dressing Status New dressing applied 04/06/22 1449   Wound Cleansed Cleansed with saline 04/06/22 1449   Dressing/Treatment Alginate;Dry dressing 04/06/22 1449   Offloading for Diabetic Foot Ulcers Offloading not required 04/06/22 1449   Wound Length (cm) 0.6 cm 04/06/22 1402   Wound Width (cm) 1.4 cm 04/06/22 1402   Wound Depth (cm) 0.4 cm 04/06/22 1402   Wound Surface Area (cm^2) 0.84 cm^2 04/06/22 1402   Change in Wound Size % (l*w) -29.23 04/06/22 1402   Wound Volume (cm^3) 0.336 cm^3 04/06/22 1402   Wound Healing % -72 04/06/22 1402   Post-Procedure Length (cm) 0.7 cm 04/06/22 1442   Post-Procedure Width (cm) 1.4 cm 04/06/22 1442   Post-Procedure Depth (cm) 0.4 cm 04/06/22 1442   Post-Procedure Surface Area (cm^2) 0.98 cm^2 04/06/22 1442   Post-Procedure Volume (cm^3) 0.392 cm^3 04/06/22 1442   Undermining Starts ___ O'Clock 3 04/06/22 1402   Undermining Ends___ O'Clock 8 04/06/22 1402   Undermining Maxium Distance (cm) 0.5 @ 7 04/06/22 1402   Wound Assessment Fibrin;Pink/red 04/06/22 1402   Drainage Amount Moderate 04/06/22 1402   Drainage Description Yellow 04/06/22 1402   Odor None 04/06/22 1402   Ling-wound Assessment Intact;Fragile 04/06/22 1402   Number of days: 7     Incision 07/06/21 Breast Right;Medial (Active)   Number of days: 274       Incision 02/08/22 Chest Right (Active)   Number of days: 57     Procedure Note  Indications:  Based on my examination of this patient's wound(s)/ulcer(s) today, debridement is required to promote healing and evaluate the wound base.     Performed by: Nader Gibbs MD    Consent obtained:  Yes    Time out taken:  Yes    Pain Control: Anesthetic  Anesthetic: 4% Lidocaine Liquid Topical     Debridement:Non-excisional Debridement    Using curette the wound(s)/ulcer(s) was/were sharply debrided down through and including the removal of epidermis and dermis. Devitalized Tissue Debrided:  fibrin, biofilm, slough and exudate to stimulate bleeding to promote healing, post debridement good bleeding base and wound edges noted    Wound/Ulcer #: 1    Percent of Wound/Ulcer Debrided: 100%    Total Surface Area Debrided:  0.84 sq cm     Estimated Blood Loss:  Minimal  Hemostasis Achieved:  by pressure    Procedural Pain:  2 / 10   Post Procedural Pain:  1 / 10     Response to treatment:  Well tolerated by patient. Plan:   Treatment Note please see attached Discharge Instructions    Written patient dismissal instructions given to patient and signed by patient or POA. Discharge Instructions       Visit Discharge/Physician Orders     Discharge condition: Stable     Assessment of pain at discharge: mild     Anesthetic used: lido 4%     Discharge to: Home     Left via:Private automobile     Accompanied by: accompanied by self     ECF/HHA: [facility]      Dressing Orders: To right breast wound: Cleanse with normal saline, pack loosely with aquacel rope, cover and secure with dry dressing. Change daily. Remove aquacel dry, do not wet to remove.      Treatment Orders: Eat a diet high in protein and vitamin C. Take a multiple vitamin daily unless contraindicated.     Do not get wet.       Screening for hyperbaric oxygen treatment     [address] [place] [address],[address] followup visit  1 week_________________________  (Please note your next appointment above and if you are unable to keep, kindly give a 24 hour notice.  Thank you.)     Physician signature:__________________________        If you experience any of the following, please call the [address] [place] [address] during business hours:     * Increase in Pain  * Temperature over 101  * Increase in drainage from your wound  * Drainage with a foul odor  * Bleeding  * Increase in swelling  * Need for compression bandage changes due to slippage, breakthrough drainage.     If you need medical attention outside of the business hours of the [facility] [address] please contact your PCP or go to the nearest emergency room.           Electronically signed by Maco Bernal MD on 4/6/2022 at 3:41 PM

## 2022-04-13 ENCOUNTER — HOSPITAL ENCOUNTER (OUTPATIENT)
Dept: WOUND CARE | Age: 52
Discharge: HOME | End: 2022-04-13
Payer: COMMERCIAL

## 2022-04-13 VITALS
BODY MASS INDEX: 30.1 KG/M2 | RESPIRATION RATE: 18 BRPM | HEART RATE: 84 BPM | WEIGHT: 144 LBS | TEMPERATURE: 96.4 F | DIASTOLIC BLOOD PRESSURE: 70 MMHG | SYSTOLIC BLOOD PRESSURE: 122 MMHG

## 2022-04-13 DIAGNOSIS — T66.XXXS LATE EFFECT OF RADIATION: ICD-10-CM
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
## 2022-04-13 DIAGNOSIS — L59.8 RADIATION NECROSIS OF SKIN AND SUBCUTANEOUS: Chronic | ICD-10-CM

## 2022-04-13 DIAGNOSIS — L98.499 ULCER OF SKIN OF BREAST (HCC): Primary | ICD-10-CM

## 2022-04-13 DIAGNOSIS — T81.30XA WOUND DEHISCENCE: ICD-10-CM

## 2022-04-13 PROCEDURE — 97597 DBRDMT OPN WND 1ST 20 CM/<: CPT | Performed by: SURGERY

## 2022-04-13 PROCEDURE — 6370000000 HC RX 637 (ALT 250 FOR IP): Performed by: SURGERY

## 2022-04-13 PROCEDURE — 11042 DBRDMT SUBQ TIS 1ST 20SQCM/<: CPT

## 2022-04-13 RX ORDER — GENTAMICIN SULFATE 1 MG/G
OINTMENT TOPICAL ONCE
Status: CANCELLED | OUTPATIENT
Start: 2022-04-13 | End: 2022-04-13

## 2022-04-13 RX ORDER — BACITRACIN, NEOMYCIN, POLYMYXIN B 400; 3.5; 5 [USP'U]/G; MG/G; [USP'U]/G
OINTMENT TOPICAL ONCE
Status: CANCELLED | OUTPATIENT
Start: 2022-04-13 | End: 2022-04-13

## 2022-04-13 RX ORDER — LIDOCAINE HYDROCHLORIDE 20 MG/ML
JELLY TOPICAL ONCE
Status: CANCELLED | OUTPATIENT
Start: 2022-04-13 | End: 2022-04-13

## 2022-04-13 RX ORDER — LIDOCAINE 40 MG/G
CREAM TOPICAL ONCE
Status: CANCELLED | OUTPATIENT
Start: 2022-04-13 | End: 2022-04-13

## 2022-04-13 RX ORDER — LIDOCAINE HYDROCHLORIDE 40 MG/ML
SOLUTION TOPICAL ONCE
Status: CANCELLED | OUTPATIENT
Start: 2022-04-13 | End: 2022-04-13

## 2022-04-13 RX ORDER — BACITRACIN ZINC AND POLYMYXIN B SULFATE 500; 1000 [USP'U]/G; [USP'U]/G
OINTMENT TOPICAL ONCE
Status: CANCELLED | OUTPATIENT
Start: 2022-04-13 | End: 2022-04-13

## 2022-04-13 RX ORDER — CLOBETASOL PROPIONATE 0.5 MG/G
OINTMENT TOPICAL ONCE
Status: CANCELLED | OUTPATIENT
Start: 2022-04-13 | End: 2022-04-13

## 2022-04-13 RX ORDER — BETAMETHASONE DIPROPIONATE 0.05 %
OINTMENT (GRAM) TOPICAL ONCE
Status: CANCELLED | OUTPATIENT
Start: 2022-04-13 | End: 2022-04-13

## 2022-04-13 RX ORDER — LIDOCAINE 50 MG/G
OINTMENT TOPICAL ONCE
Status: CANCELLED | OUTPATIENT
Start: 2022-04-13 | End: 2022-04-13

## 2022-04-13 RX ORDER — LIDOCAINE HYDROCHLORIDE 40 MG/ML
SOLUTION TOPICAL ONCE
Status: COMPLETED | OUTPATIENT
Start: 2022-04-13 | End: 2022-04-13

## 2022-04-13 RX ORDER — GINSENG 100 MG
CAPSULE ORAL ONCE
Status: CANCELLED | OUTPATIENT
Start: 2022-04-13 | End: 2022-04-13

## 2022-04-13 RX ADMIN — LIDOCAINE HYDROCHLORIDE 10 ML: 40 SOLUTION TOPICAL at 14:00

## 2022-04-14 NOTE — PROGRESS NOTES
Wound Healing Center Followup Visit Note    Referring Physician : Claria Scheuermann, MD [facility]  MEDICAL RECORD NUMBER:  [id number]  AGE: 54 y.o. GENDER: female  : 1970  EPISODE DATE:  2022    Subjective:     Chief Complaint   Patient presents with    Wound Check     Right breast      HISTORY of PRESENT ILLNESS HPI   Sandra Parsons is a 54 y.o. female who presents today in regards to follow up evaluation and treatment of wound/ulcer. That patient's past medical, family and social hx were reviewed and changes were made if present. History of Wound Context:  Patient has had a wound of her right chest since 2022. She had her mastectomy wound dehisce. She had her first breast cancer at age 30.  She had undergone a lumpectomy with reexcision for margins and radiation at that time first at [facility] and then up at [facility]. Kyara Hui was estrogen progesterone positive and HER-2 negative and was placed on tamoxifen then for 5 years. She had a recurrence in . It was estrogen progesterone negative and HER-2 positive.  She underwent an MRI of her breast and the tumor was only 1.6 cm in size.  Due to the recurrence she also underwent CAT scans and bone scans which were negative.  Due to the tumor being less than 2 cm in size we took her to the OR and she elected for bilateral mastectomy.  [place] lymph node was attempted but it turned into a right modified radical mastectomy. Joselin Garcia final pathology showed that she had multiple lymph nodes that were positive.  So she went into surgery and a stage Ia tumor and came out of surgery as a stage IIIa. She underwent PET scan in October which showed postoperative change.      At her second postoperative appointment she had a lot of drainage through the wound despite the FABIO drain that had been left.  And she dehisced in the middle to lateral aspect of the wound.  She was treated with wet-to-dry dressings.  When she got off her chemotherapy and onto immunotherapy she went back to surgery in 2/2022 for debridement and closure.  The wound was able to be closed but it had a little bit of tension medially.  She developed a seroma.      She has had a wound vac since ~ 2/2022. All of her sutures have been removed. On their initial visit to the wound healing center, 3/30/22  the patient has noted that the wound has been stable. The patient has not had similar previous wounds in the past.      Pt is not on abx at time of initial visit.     3/30/22  · Culture done  · aquacell packing  · Stop wound vac  · Screen for hbo  · Labs and cxr ordered   4/6/22  · Wound improved  4/13/22  · Improved     Wound/Ulcer Pain Timing/Severity: none  Quality of pain: N/A  Severity:  0 / 10   Modifying Factors: None  Associated Signs/Symptoms: drainage    Ulcer Identification:  Ulcer Type: non-healing surgical, soft tissue radionecrosis, compromised skin graft/flap and necrosis radiation  Contributing Factors: edema and decreased tissue oxygenation    Diabetic/Pressure/Non Pressure Ulcers only:  Ulcer: Non-Pressure ulcer, fat layer exposed  Wound: Wound dehiscence        PAST MEDICAL HISTORY      Diagnosis Date    Cancer [facility]) 2008    right breast    Late effect of radiation 3/30/2022    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 LUMPECTOMY Right     CARPAL TUNNEL RELEASE Right     CHEST WALL RESECTION Right 2/8/2022    RIGHT CHEST WALL COMPLEX WOUND CLOSURE AND WOUND VAC performed by Gregorio Woodard MD at [facility] Left 7/6/2021    LEFT SUBCLAVIAN MEDI PORT INSERTION performed by Gregorio Woodard MD at [place] [address] Family History   Problem Relation Age of Onset    Cancer Mother         lung     Social History     Tobacco Use    Smoking status: Former Smoker     Packs/day: 0.50    Smokeless tobacco: Former User     Quit date: 3/9/2022    Tobacco comment: 1/2 pack or less    Vaping Use    Vaping Use: Never used   Substance Use Topics    Alcohol use: Yes     Comment: occasionaly    Drug use: Never     Allergies   Allergen Reactions    Meperidine     Morphine     Sulfa Antibiotics Nausea And Vomiting and Other (See Comments)     fever     Current Outpatient Medications on File Prior to Encounter   Medication Sig Dispense Refill    Multiple Vitamins-Minerals (THERAPEUTIC MULTIVITAMIN-MINERALS) tablet Take 1 tablet by mouth daily      BIOTIN PO Take by mouth      NONFORMULARY Inject into the muscle every 21 days Chemo cocktail:  Pertuzumab, Trastuzumab, Hyaluronidase  Goes to [facility] for the injection       No current facility-administered medications on file prior to encounter.        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)    Ulcer of skin of breast (HCC) - Primary (Chronic)    Relevant Orders    Initiate Outpatient Wound Care Protocol    Late effect of radiation (Chronic)    Relevant Orders    Initiate Outpatient Wound Care Protocol    Wound dehiscence    Relevant Orders    Initiate Outpatient Wound Care Protocol          Pre Debridement Measurements:  Are located in the [facility]  Documentation Flow Sheet  Post Debridement Measurements:  Wound/Ulcer Descriptions are Pre Debridement except measurements:     Wound 03/30/22 Breast Right #1 (Active)   Wound Image   03/30/22 1323   Dressing Status New dressing applied;Clean;Dry; Intact 04/13/22 1509   Wound Cleansed Cleansed with saline 04/13/22 1509   Dressing/Treatment Alginate;Dry dressing 04/13/22 1509   Offloading for Diabetic Foot Ulcers Offloading not required 04/06/22 1449   Wound Length (cm) 0.4 cm 04/13/22 1400   Wound Width (cm) 1.5 cm 04/13/22 1400   Wound Depth (cm) 0.3 cm 04/13/22 1400   Wound Surface Area (cm^2) 0.6 cm^2 04/13/22 1400   Change in Wound Size % (l*w) 7.69 04/13/22 1400   Wound Volume (cm^3) 0.18 cm^3 04/13/22 1400   Wound Healing % 8 04/13/22 1400   Post-Procedure Length (cm) 0.5 cm 04/13/22 1432   Post-Procedure Width (cm) 1.5 cm 04/13/22 1432   Post-Procedure Depth (cm) 0.3 cm 04/13/22 1432   Post-Procedure Surface Area (cm^2) 0.75 cm^2 04/13/22 1432   Post-Procedure Volume (cm^3) 0.225 cm^3 04/13/22 1432   Undermining Starts ___ O'Clock 3 04/06/22 1402   Undermining Ends___ O'Clock 8 04/06/22 1402   Undermining Maxium Distance (cm) 0.5 @ 7 04/06/22 1402   Wound Assessment Fibrin 04/13/22 1400   Drainage Amount Moderate 04/13/22 1400   Drainage Description Yellow 04/13/22 1400   Odor None 04/13/22 1400   Ling-wound Assessment Intact 04/13/22 1400   Number of days: 14     Incision 07/06/21 Breast Right;Medial (Active)   Number of days: 281       Incision 02/08/22 Chest Right (Active)   Number of days: 64     Procedure Note  Indications:  Based on my examination of this patient's wound(s)/ulcer(s) today, debridement is required to promote healing and evaluate the wound base.     Performed by: Ebony Camarena MD    Consent obtained:  Yes    Time out taken:  Yes    Pain Control: Anesthetic  Anesthetic: 4% Lidocaine Liquid Topical     Debridement:Non-excisional Debridement    Using curette the wound(s)/ulcer(s) was/were sharply debrided down through and including the removal of epidermis and dermis. Devitalized Tissue Debrided:  fibrin, biofilm, slough and exudate to stimulate bleeding to promote healing, post debridement good bleeding base and wound edges noted    Wound/Ulcer #: 1    Percent of Wound/Ulcer Debrided: 100%    Total Surface Area Debrided:  0.62 sq cm     Estimated Blood Loss:  Minimal  Hemostasis Achieved:  by pressure    Procedural Pain:  2  / 10   Post Procedural Pain:  1 / 10     Response to treatment:  Well tolerated by patient. Plan:   Treatment Note please see attached Discharge Instructions    Written patient dismissal instructions given to patient and signed by patient or POA. Discharge Instructions       Visit Discharge/Physician Orders     Discharge condition: Stable     Assessment of pain at discharge: mild     Anesthetic used: lido 4%     Discharge to: Home     Left via:Private automobile     Accompanied by: accompanied by self     ECF/HHA: [facility]      Dressing Orders: To right breast wound: Cleanse with normal saline, pack loosely with aquacel rope, cover and secure with dry dressing. Change daily. Remove aquacel dry, do not wet to remove.      Treatment Orders: Eat a diet high in protein and vitamin C. Take a multiple vitamin daily unless contraindicated.     Do not get wet.       Screening for hyperbaric oxygen treatment     [facility] followup visit  1 week_________________________  (Please note your next appointment above and if you are unable to keep, kindly give a 24 hour notice.  Thank you.)     Physician signature:__________________________        If you experience any of the following, please call the [address] [place] [address] Road during business hours:     * Increase in Pain  * Temperature over 101  * Increase in drainage from your wound  * Drainage with a foul odor  * Bleeding  * Increase in swelling  * Need for compression bandage changes due to slippage, breakthrough drainage.     If you need medical attention outside of the business hours of the [address] [place] [address] Road please contact your PCP or go to the nearest emergency room.         Electronically signed by Aaron Benjamin MD on 4/13/2022 at 11:09 PM

## 2022-04-19 ENCOUNTER — HOSPITAL ENCOUNTER (OUTPATIENT)
Dept: HYPERBARIC MEDICINE | Age: 52
Setting detail: THERAPIES SERIES
Discharge: HOME | End: 2022-04-19
Payer: COMMERCIAL

## 2022-04-19 VITALS
SYSTOLIC BLOOD PRESSURE: 138 MMHG | TEMPERATURE: 97.4 F | DIASTOLIC BLOOD PRESSURE: 76 MMHG | RESPIRATION RATE: 16 BRPM | HEART RATE: 84 BPM

## 2022-04-19 DIAGNOSIS — T66.XXXS LATE EFFECT OF RADIATION: Primary | ICD-10-CM

## 2022-04-19 PROCEDURE — G0277 HBOT, FULL BODY CHAMBER, 30M: HCPCS

## 2022-04-19 PROCEDURE — 99183 HYPERBARIC OXYGEN THERAPY: CPT | Performed by: SURGERY

## 2022-04-19 NOTE — PROGRESS NOTES
[facility]  Hyperbaric Oxygen Therapy   Progress Note    NAME: Lennox Caldron [facility]  MEDICAL RECORD NUMBER:  [id number]  AGE: 54 y.o. GENDER: female  : 1970  EPISODE DATE:  2022   Subjective   HBO Treatment Number: 1 out of Total Treatments: 20  HBO Diagnosis:   Problem List Items Addressed This Visit     Late effect of radiation - Primary (Chronic)    Relevant Orders    Notify physician (specify)    Hyperbaric Oxygen Therapy      Radiation necrosis of skin and subcutaneous tissue        Safety checks performed prior to treatment. See doc flowsheets for documentation. Objective       No results for input(s): GLUMET in the last 72 hours. Pre treatment Vital Signs       Temp: 97.2 °F (36.2 °C)     Pulse: 96     Resp: 16     BP: (!) 142/80     Post treatment Vital Signs  Temp: 97.4 °F (36.3 °C)  Pulse: 84  Resp: 16  BP: 138/76  Assessment      Physical Exam:  General Appearance:  alert and oriented to person, place and time, well-developed and well-nourished, in no acute distress  ENT:  tympanic membranes intact bilaterally  Pulmonary/Chest:  clear to auscultation bilaterally- no wheezes, rales or rhonchi, normal air movement, no respiratory distress  Cardiovascular:  regular rate and rhythm  Chamber #: 39  Treatment Start Time: 0748     Pressure Reached Time: 0804  FADI : 2.5  Number of Air Breaks:  Treatment Status: Back to oxygen     Decompression Time: 946   Treatment End Time: 59  Length of Treatment: 90 Minutes  Symptoms Noted During Treatment: None  Total Treatment Time (min): 131    Adverse Event: no    I was present on these premises and immediately available to furnish assistance & direction throughout the procedure. Laura Lima is a 54 y.o. female  did successfully complete today's hyperbaric oxygen treatment at [address] [place] [address].     In my clinical judgement, ongoing HBO therapy is  necessary at this time, given a threat

## 2022-04-20 ENCOUNTER — HOSPITAL ENCOUNTER (OUTPATIENT)
Dept: WOUND CARE | Age: 52
Discharge: HOME | End: 2022-04-20
Payer: COMMERCIAL

## 2022-04-20 ENCOUNTER — HOSPITAL ENCOUNTER (OUTPATIENT)
Dept: HYPERBARIC MEDICINE | Age: 52
Setting detail: THERAPIES SERIES
Discharge: HOME | End: 2022-04-20
Payer: COMMERCIAL

## 2022-04-20 VITALS
HEART RATE: 76 BPM | SYSTOLIC BLOOD PRESSURE: 118 MMHG | BODY MASS INDEX: 30.1 KG/M2 | TEMPERATURE: 96.2 F | RESPIRATION RATE: 18 BRPM | WEIGHT: 144 LBS | DIASTOLIC BLOOD PRESSURE: 78 MMHG

## 2022-04-20 VITALS
RESPIRATION RATE: 20 BRPM | HEART RATE: 77 BPM | SYSTOLIC BLOOD PRESSURE: 159 MMHG | DIASTOLIC BLOOD PRESSURE: 94 MMHG | TEMPERATURE: 97.1 F

## 2022-04-20 DIAGNOSIS — T66.XXXS LATE EFFECT OF RADIATION: ICD-10-CM

## 2022-04-20 DIAGNOSIS — L59.8 RADIATION NECROSIS OF SKIN AND SUBCUTANEOUS: Chronic | ICD-10-CM

## 2022-04-20 DIAGNOSIS — T66.XXXS LATE EFFECT OF RADIATION: Primary | ICD-10-CM
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
## 2022-04-20 DIAGNOSIS — L98.499 ULCER OF SKIN OF BREAST (HCC): Primary | ICD-10-CM

## 2022-04-20 DIAGNOSIS — T81.30XA WOUND DEHISCENCE: ICD-10-CM

## 2022-04-20 PROCEDURE — 11042 DBRDMT SUBQ TIS 1ST 20SQCM/<: CPT

## 2022-04-20 PROCEDURE — G0277 HBOT, FULL BODY CHAMBER, 30M: HCPCS

## 2022-04-20 PROCEDURE — 6370000000 HC RX 637 (ALT 250 FOR IP): Performed by: SURGERY

## 2022-04-20 PROCEDURE — 99183 HYPERBARIC OXYGEN THERAPY: CPT | Performed by: SURGERY

## 2022-04-20 PROCEDURE — 11042 DBRDMT SUBQ TIS 1ST 20SQCM/<: CPT | Performed by: SURGERY

## 2022-04-20 RX ORDER — CLOBETASOL PROPIONATE 0.5 MG/G
OINTMENT TOPICAL ONCE
Status: CANCELLED | OUTPATIENT
Start: 2022-04-20 | End: 2022-04-20

## 2022-04-20 RX ORDER — LIDOCAINE HYDROCHLORIDE 40 MG/ML
SOLUTION TOPICAL ONCE
Status: CANCELLED | OUTPATIENT
Start: 2022-04-20 | End: 2022-04-20

## 2022-04-20 RX ORDER — BETAMETHASONE DIPROPIONATE 0.05 %
OINTMENT (GRAM) TOPICAL ONCE
Status: CANCELLED | OUTPATIENT
Start: 2022-04-20 | End: 2022-04-20

## 2022-04-20 RX ORDER — LIDOCAINE HYDROCHLORIDE 40 MG/ML
SOLUTION TOPICAL ONCE
Status: COMPLETED | OUTPATIENT
Start: 2022-04-20 | End: 2022-04-20

## 2022-04-20 RX ORDER — GINSENG 100 MG
CAPSULE ORAL ONCE
Status: CANCELLED | OUTPATIENT
Start: 2022-04-20 | End: 2022-04-20

## 2022-04-20 RX ORDER — LIDOCAINE 40 MG/G
CREAM TOPICAL ONCE
Status: CANCELLED | OUTPATIENT
Start: 2022-04-20 | End: 2022-04-20

## 2022-04-20 RX ORDER — BACITRACIN, NEOMYCIN, POLYMYXIN B 400; 3.5; 5 [USP'U]/G; MG/G; [USP'U]/G
OINTMENT TOPICAL ONCE
Status: CANCELLED | OUTPATIENT
Start: 2022-04-20 | End: 2022-04-20

## 2022-04-20 RX ORDER — BACITRACIN ZINC AND POLYMYXIN B SULFATE 500; 1000 [USP'U]/G; [USP'U]/G
OINTMENT TOPICAL ONCE
Status: CANCELLED | OUTPATIENT
Start: 2022-04-20 | End: 2022-04-20

## 2022-04-20 RX ORDER — LIDOCAINE HYDROCHLORIDE 20 MG/ML
JELLY TOPICAL ONCE
Status: CANCELLED | OUTPATIENT
Start: 2022-04-20 | End: 2022-04-20

## 2022-04-20 RX ORDER — GENTAMICIN SULFATE 1 MG/G
OINTMENT TOPICAL ONCE
Status: CANCELLED | OUTPATIENT
Start: 2022-04-20 | End: 2022-04-20

## 2022-04-20 RX ORDER — LIDOCAINE 50 MG/G
OINTMENT TOPICAL ONCE
Status: CANCELLED | OUTPATIENT
Start: 2022-04-20 | End: 2022-04-20

## 2022-04-20 RX ADMIN — LIDOCAINE HYDROCHLORIDE 10 ML: 40 SOLUTION TOPICAL at 13:14

## 2022-04-20 NOTE — PROGRESS NOTES
Glenda Rich is a 54 y.o. female has been receiving hyperbaric oxygen treatment for management of: Indication  Indications: Late Effect of Radiation (right breast). Ms. Miriam Sinha has completed Treatment Number: 2 out of a treatment protocol of Total Treatments: 20.    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 XXXS       Patients ID was verified. Hyperbaric oxygen therapy plan of care, patient history, outpatient fall risk assessment, nutritional assessment and daily medications were reviewed, addressed and updated as needed. Pt is tolerating hyperbaric oxygen therapy  well without complications. Shawnee Trejo RN  2022  3:20 PM         RN HYPERBARIC OXYGEN THERAPY RISK ASSESSMENT TOOL   [facility]     Viviana Sinha  MEDICAL RECORD NUMBER:  [id number]  AGE: 54 y.o.    GENDER: female  : 1970  EPISODE DATE:  2022       PAST MEDICAL HISTORY      Diagnosis Date    Cancer [facility])     right breast    Late effect of radiation 3/30/2022    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    Ulcer of skin of breast [address] [address] 3/30/2022       PAST SURGICAL HISTORY  Past Surgical History:   Procedure Laterality Date    BREAST BIOPSY Bilateral 2021    RIGHT MODIFIED RADICAL MASTECTOMY,  LEFT BREAST PROPHYLATIC MASTECTOMY performed by Lesli Zarate MD at [address] LUMPECTOMY Right     CARPAL TUNNEL RELEASE Right     CHEST WALL RESECTION Right 2022    RIGHT CHEST WALL COMPLEX WOUND CLOSURE AND WOUND VAC performed by Lesli Zarate MD at [address] HYSTERECTOMY      PORT SURGERY Left 7/6/2021    LEFT SUBCLAVIAN MEDI PORT INSERTION performed by Anamika Cox MD at [address]  Family History   Problem Relation Age of Onset    Cancer Mother         lung       SOCIAL HISTORY  Social History     Tobacco Use    Smoking status: Former Smoker     Packs/day: 0.50    Smokeless tobacco: Former User     Quit date: 3/9/2022    Tobacco comment: 1/2 pack or less    Vaping Use    Vaping Use: Never used   Substance Use Topics    Alcohol use: Yes     Comment: occasionaly    Drug use: Never       ALLERGIES  Allergies   Allergen Reactions    Meperidine     Morphine     Sulfa Antibiotics Nausea And Vomiting and Other (See Comments)     fever       MEDICATIONS  Current Outpatient Medications on File Prior to Encounter   Medication Sig Dispense Refill    Multiple Vitamins-Minerals (THERAPEUTIC MULTIVITAMIN-MINERALS) tablet Take 1 tablet by mouth daily      BIOTIN PO Take by mouth      NONFORMULARY Inject into the muscle every 21 days Chemo cocktail:  Pertuzumab, Trastuzumab, Hyaluronidase  Goes to [facility] for the injection       No current facility-administered medications on file prior to encounter. LABS  HgBA1c:    Lab Results   Component Value Date    LABA1C 5.4 04/01/2022            Please add comments to any \"yes\" answers. Do you have a history of: Comments   Seizure no   Congenital spherocytosis no   Optic Neuritis no   Cataracts no   Eye Surgery no   Ear problems no   Ear reconstructive surgery no   Sinus problems no   Asthma no   Bronchitis no   Emphysema no   Pneumothorax no   Tuberculosis no   Other lung problems no   Hypertension no   Pacemaker/AICD no   Congestive heart failure no   EF% >30% n/a   Any implanted device no   Dialysis no   Current pregnancy no   Claustrophobia no   Diabetes no   Currently using these medications: no    a.   Disulfiram (Antabuse®) no    b. Mafenide acetate        (Sulfamylon®) no    c.  Diuretics for CHF no    d. Amiodarone dose > 400mg/day no    e. Lanoxin/Digoxin no    f. Current Steroid use     no   Cancer:  Yes breast cancer    a. Surgery for Cancer yes    b. Radiation therapy yes    c. Chemotherapy yes    If yes, did you receive: no    a. Doxorubicin (Adriamycin®) no    b.   Cisplatin (Platinol AQ®) no    c.  Bleomycin (Blenoxane®) no     The above was reviewed with: Patient    Electronically signed by Luciano Velazquez RN on 4/20/2022 at 3:21 PM

## 2022-04-20 NOTE — PLAN OF CARE
Problem: Wound:  Goal: Will show signs of wound healing; wound closure and no evidence of infection  Description: Will show signs of wound healing; wound closure and no evidence of infection  Outcome: Progressing     Problem: Smoking cessation:  Goal: Ability to formulate a plan to maintain a tobacco-free life will be supported  Description: Ability to formulate a plan to maintain a tobacco-free life will be supported  Outcome: Completed

## 2022-04-20 NOTE — PROGRESS NOTES
Wound Healing Center Followup Visit Note    Referring Physician : Wynonia Carpen, MD Lenn[facility]  MEDICAL RECORD NUMBER:  [id number]  AGE: 54 y.o. GENDER: female  : 1970  EPISODE DATE:  2022    Subjective:     Chief Complaint   Patient presents with    Wound Check     Right breast      HISTORY of PRESENT ILLNESS HPI   Dustin Lima is a 54 y.o. female who presents today in regards to follow up evaluation and treatment of wound/ulcer. That patient's past medical, family and social hx were reviewed and changes were made if present. History of Wound Context:  Patient has had a wound of her right chest since 2022. She had her mastectomy wound dehisce. She had her first breast cancer at age 30.  She had undergone a lumpectomy with reexcision for margins and radiation at that time first at [facility] and then up at [facility]. Jerrod Do was estrogen progesterone positive and HER-2 negative and was placed on tamoxifen then for 5 years. She had a recurrence in . It was estrogen progesterone negative and HER-2 positive.  She underwent an MRI of her breast and the tumor was only 1.6 cm in size.  Due to the recurrence she also underwent CAT scans and bone scans which were negative.  Due to the tumor being less than 2 cm in size we took her to the OR and she elected for bilateral mastectomy.  [place] lymph node was attempted but it turned into a right modified radical mastectomy. Rohini Overton final pathology showed that she had multiple lymph nodes that were positive.  So she went into surgery and a stage Ia tumor and came out of surgery as a stage IIIa. She underwent PET scan in October which showed postoperative change.      At her second postoperative appointment she had a lot of drainage through the wound despite the FABIO drain that had been left.  And she dehisced in the middle to lateral aspect of the wound.  She was treated with wet-to-dry dressings.  When she got off her chemotherapy and onto immunotherapy she went back to surgery in 2/2022 for debridement and closure.  The wound was able to be closed but it had a little bit of tension medially.  She developed a seroma.      She has had a wound vac since ~ 2/2022. All of her sutures have been removed. On their initial visit to the wound healing center, 3/30/22  the patient has noted that the wound has been stable. The patient has not had similar previous wounds in the past.      Pt is not on abx at time of initial visit.     3/30/22  · Culture done  · aquacell packing  · Stop wound vac  · Screen for hbo  · Labs and cxr ordered   4/6/22  · Wound improved  4/13/22  · Improved  4/20/2022  · Ulcer improving  Wound/Ulcer Pain Timing/Severity: none  Quality of pain: N/A  Severity:  0 / 10   Modifying Factors: None  Associated Signs/Symptoms: drainage    Ulcer Identification:  Ulcer Type: non-healing surgical, soft tissue radionecrosis, compromised skin graft/flap and necrosis radiation  Contributing Factors: edema and decreased tissue oxygenation    Diabetic/Pressure/Non Pressure Ulcers only:  Ulcer: Non-Pressure ulcer, fat layer exposed  Wound: Wound dehiscence        PAST MEDICAL HISTORY      Diagnosis Date    Cancer [facility]) 2008    right breast    Late effect of radiation 3/30/2022    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    Ulcer of skin of breast ([facility] [address] 3/30/2022     Past Surgical History:   Procedure Laterality Date    BREAST BIOPSY Bilateral 7/6/2021    RIGHT MODIFIED RADICAL MASTECTOMY,  LEFT BREAST PROPHYLATIC MASTECTOMY performed by Evin Taylor MD at [address] LUMPECTOMY Right     CARPAL TUNNEL RELEASE Right     CHEST WALL RESECTION Right 2/8/2022    RIGHT CHEST WALL COMPLEX WOUND CLOSURE AND WOUND VAC performed by Evin Taylor MD at [facility] Left 7/6/2021    LEFT SUBCLAVIAN MEDI PORT INSERTION performed by Evin Taylor MD at [facility] TONSILLECTOMY AND ADENOIDECTOMY       Family History   Problem Relation Age of Onset    Cancer Mother         lung     Social History     Tobacco Use    Smoking status: Former Smoker     Packs/day: 0.50    Smokeless tobacco: Former User     Quit date: 3/9/2022    Tobacco comment: 1/2 pack or less    Vaping Use    Vaping Use: Never used   Substance Use Topics    Alcohol use: Yes     Comment: occasionaly    Drug use: Never     Allergies   Allergen Reactions    Meperidine     Morphine     Sulfa Antibiotics Nausea And Vomiting and Other (See Comments)     fever     Current Outpatient Medications on File Prior to Encounter   Medication Sig Dispense Refill    Multiple Vitamins-Minerals (THERAPEUTIC MULTIVITAMIN-MINERALS) tablet Take 1 tablet by mouth daily      BIOTIN PO Take by mouth      NONFORMULARY Inject into the muscle every 21 days Chemo cocktail:  Pertuzumab, Trastuzumab, Hyaluronidase  Goes to [facility] for the injection       No current facility-administered medications on file prior to encounter.        REVIEW OF SYSTEMS See HPI    Objective:    /78   Pulse 76   Temp 96.2 °F (35.7 °C) (Tympanic)   Resp 18   Wt 144 lb (65.3 kg)   BMI 30.10 kg/m²   Wt Readings from Last 3 Encounters:   04/20/22 144 lb (65.3 kg)   04/13/22 144 lb (65.3 kg)   04/06/22 144 lb (65.3 kg)     PHYSICAL EXAM  CONSTITUTIONAL:   Awake, alert, cooperative   EYES:  lids and lashes normal   ENT: external ears and nose without lesions   NECK:  supple, symmetrical, trachea midline   SKIN:  Open wound present    Assessment:     Problem List Items Addressed This Visit     Late effect of radiation (Chronic)    Relevant Orders    Initiate Outpatient Wound Care Protocol    Ulcer of skin of breast ([facility] [address] - Primary (Chronic)    Relevant Orders    Initiate Outpatient Wound Care Protocol    Wound dehiscence    Relevant Orders    Initiate Outpatient Wound Care Protocol          Pre Debridement Measurements:  Are located in the [facility]  Documentation Flow Sheet  Post Debridement Measurements:  Wound/Ulcer Descriptions are Pre Debridement except measurements:     Wound 03/30/22 Breast Right #1 (Active)   Wound Image   03/30/22 1323   Dressing Status New dressing applied;Clean;Dry; Intact 04/13/22 1509   Wound Cleansed Cleansed with saline 04/13/22 1509   Dressing/Treatment Alginate;Dry dressing 04/13/22 1509   Offloading for Diabetic Foot Ulcers Offloading not required 04/06/22 1449   Wound Length (cm) 0.3 cm 04/20/22 1310   Wound Width (cm) 1.4 cm 04/20/22 1310   Wound Depth (cm) 0.3 cm 04/20/22 1310   Wound Surface Area (cm^2) 0.42 cm^2 04/20/22 1310   Change in Wound Size % (l*w) 35.38 04/20/22 1310   Wound Volume (cm^3) 0.126 cm^3 04/20/22 1310   Wound Healing % 35 04/20/22 1310   Post-Procedure Length (cm) 0.4 cm 04/20/22 1341   Post-Procedure Width (cm) 1.4 cm 04/20/22 1341   Post-Procedure Depth (cm) 0.3 cm 04/20/22 1341   Post-Procedure Surface Area (cm^2) 0.56 cm^2 04/20/22 1341   Post-Procedure Volume (cm^3) 0.168 cm^3 04/20/22 1341   Undermining Starts ___ O'Clock 3 04/06/22 1402   Undermining Ends___ O'Clock 8 04/06/22 1402   Undermining Maxium Distance (cm) 0.5 @ 7 04/06/22 1402   Wound Assessment Fibrin;Pink/red 04/20/22 1310   Drainage Amount Moderate 04/20/22 1310   Drainage Description Yellow 04/20/22 1310   Odor None 04/20/22 1310   Ling-wound Assessment Intact 04/20/22 1310   Number of days: 21     Incision 07/06/21 Breast Right;Medial (Active)   Number of days: 287       Incision 02/08/22 Chest Right (Active)   Number of days: 71     Procedure Note  Indications:  Based on my examination of this patient's wound(s)/ulcer(s) today, debridement is required to promote healing and evaluate the wound base.     Performed by: Casandra Yip MD    Consent obtained:  Yes    Time out taken:  Yes    Pain Control: Anesthetic  Anesthetic: 4% Lidocaine Liquid Topical     Debridement:Non-excisional Debridement    Using curette the wound(s)/ulcer(s) was/were sharply debrided down through and including the removal of epidermis and dermis. And subcu       Devitalized Tissue Debrided:  fibrin, biofilm, slough and exudate to stimulate bleeding to promote healing, post debridement good bleeding base and wound edges noted    Wound/Ulcer #: 1    Percent of Wound/Ulcer Debrided: 100%    Total Surface Area Debrided:  0.62 sq cm     Estimated Blood Loss:  Minimal  Hemostasis Achieved:  by pressure    Procedural Pain:  2 / 10   Post Procedural Pain:  1 / 10     Response to treatment:  Well tolerated by patient. Plan:   Treatment Note please see attached Discharge Instructions    Written patient dismissal instructions given to patient and signed by patient or POA. Discharge Instructions       Visit Discharge/Physician Orders     Discharge condition: Stable     Assessment of pain at discharge: mild     Anesthetic used: lido 4%     Discharge to: Home     Left via:Private automobile     Accompanied by: accompanied by self     ECF/HHA: [facility]      Dressing Orders: To right breast wound: Cleanse with normal saline, pack loosely with aquacel rope, cover and secure with dry dressing. Change daily. Remove aquacel dry, do not wet to remove.      Treatment Orders: Eat a diet high in protein and vitamin C. Take a multiple vitamin daily unless contraindicated.     Do not get wet.       hyperbaric oxygen treatment     [facility] followup visit  1 week_________________________  (Please note your next appointment above and if you are unable to keep, kindly give a 24 hour notice.  Thank you.)     Physician signature:__________________________        If you experience any of the following, please call the [address] [place] [address] Road during business hours:     * Increase in Pain  * Temperature over 101  * Increase in drainage from your wound  * Drainage with a foul odor  * Bleeding  * Increase in swelling  * Need for compression bandage changes due to slippage, breakthrough drainage.     If you need medical attention outside of the business hours of the [address] [place] [address] Road please contact your PCP or go to the nearest emergency room.                 Electronically signed by Kinjal Vu MD on 4/20/2022 at 1:43 PM

## 2022-04-21 ENCOUNTER — HOSPITAL ENCOUNTER (OUTPATIENT)
Dept: HYPERBARIC MEDICINE | Age: 52
Setting detail: THERAPIES SERIES
Discharge: HOME | End: 2022-04-21
Payer: COMMERCIAL

## 2022-04-21 VITALS
TEMPERATURE: 98 F | HEART RATE: 74 BPM | RESPIRATION RATE: 18 BRPM | DIASTOLIC BLOOD PRESSURE: 58 MMHG | SYSTOLIC BLOOD PRESSURE: 108 MMHG
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
## 2022-04-21 DIAGNOSIS — L59.8 RADIATION NECROSIS OF SKIN AND SUBCUTANEOUS: Chronic | ICD-10-CM

## 2022-04-21 DIAGNOSIS — T66.XXXS LATE EFFECT OF RADIATION: Primary | ICD-10-CM

## 2022-04-21 PROCEDURE — G0277 HBOT, FULL BODY CHAMBER, 30M: HCPCS

## 2022-04-21 PROCEDURE — 99183 HYPERBARIC OXYGEN THERAPY: CPT | Performed by: SURGERY

## 2022-04-21 NOTE — PROGRESS NOTES
[facility]  Hyperbaric Oxygen Therapy   Progress Note    NAME: Emigdio Steven [facility]  MEDICAL RECORD NUMBER:  [id number]  AGE: 54 y.o. 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 See doc flowsheets for documentation. Objective       No results for input(s): GLUMET in the last 72 hours. Pre treatment Vital Signs       Temp: 96.8 °F (36 °C)     Pulse: 82     Resp: 20     BP: (!) 146/89     Post treatment Vital Signs  Temp: 97.1 °F (36.2 °C)  Pulse: 77  Resp: 20  BP: (!) 159/94  Assessment      Physical Exam:  General Appearance:  alert and oriented to person, place and time, well-developed and well-nourished, in no acute distress  ENT:  tympanic membranes intact bilaterally  Pulmonary/Chest:  clear to auscultation bilaterally- no wheezes, rales or rhonchi, normal air movement, no respiratory distress  Cardiovascular:  regular rate and rhythm  Chamber #: 38  Treatment Start Time: 1421     Pressure Reached Time: 1428  FADI : 2.5  Number of Air Breaks:  Treatment Status: Back to oxygen     Decompression Time: 1609   Treatment End Time: 1628  Length of Treatment: 90 Minutes  Symptoms Noted During Treatment: None  Total Treatment Time (min): 127    Adverse Event: no    I was present on these premises and immediately available to furnish assistance & direction throughout the procedure. Laura      Karl Hopkins is a 54 y.o. female  did successfully complete today's hyperbaric oxygen treatment at [address] [place] [address]. In my clinical judgement, ongoing HBO therapy is  necessary at this time, given a threat to patient function, limb or life from the current condition. Supervision and attendance of Hyperbaric Oxygen Therapy provided. Continue HBO treatment as outlined in the treatment plan. Hyperbaric Oxygen: Allen Rae tolerated Treatment Number: 2 well today without complications. Discharge Instructions were explained and given to Ms. Rae     Electronically signed by Cha Marina MD on 4/20/2022 at 5:55 AM

## 2022-04-22 ENCOUNTER — HOSPITAL ENCOUNTER (OUTPATIENT)
Dept: HYPERBARIC MEDICINE | Age: 52
Setting detail: THERAPIES SERIES
Discharge: HOME | End: 2022-04-22
Payer: COMMERCIAL

## 2022-04-22 VITALS
DIASTOLIC BLOOD PRESSURE: 77 MMHG | RESPIRATION RATE: 18 BRPM | SYSTOLIC BLOOD PRESSURE: 139 MMHG | HEART RATE: 76 BPM | TEMPERATURE: 97.6 F

## 2022-04-22 DIAGNOSIS — L59.8 RADIATION NECROSIS OF SKIN AND SUBCUTANEOUS: Chronic | ICD-10-CM

## 2022-04-22 DIAGNOSIS — T66.XXXS LATE EFFECT OF RADIATION: Primary | ICD-10-CM
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
## 2022-04-22 PROCEDURE — 99183 HYPERBARIC OXYGEN THERAPY: CPT | Performed by: SURGERY

## 2022-04-22 PROCEDURE — G0277 HBOT, FULL BODY CHAMBER, 30M: HCPCS

## 2022-04-22 NOTE — PROGRESS NOTES
[facility]  Hyperbaric Oxygen Therapy   Progress Note    NAME: Sugey HOYOS [facility]  MEDICAL RECORD NUMBER:  [id number]  AGE: 54 y.o. 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 See doc flowsheets for documentation. Objective       No results for input(s): GLUMET in the last 72 hours. Pre treatment Vital Signs       Temp: 96.2 °F (35.7 °C)     Pulse: 84     Resp: 16     BP: 138/70     Post treatment Vital Signs  Temp: 97.6 °F (36.4 °C)  Pulse: 76  Resp: 18  BP: 139/77  Assessment      Physical Exam:  General Appearance:  alert and oriented to person, place and time, well-developed and well-nourished, in no acute distress  ENT:  tympanic membranes intact bilaterally  Pulmonary/Chest:  clear to auscultation bilaterally- no wheezes, rales or rhonchi, normal air movement, no respiratory distress  Cardiovascular:  regular rate and rhythm  Chamber #: 39  Treatment Start Time: 1335     Pressure Reached Time: 1348  FADI : 2.5  Number of Air Breaks:  Treatment Status: Back to oxygen     Decompression Time: 1533   Treatment End Time: 7480  Length of Treatment: 90 Minutes  Symptoms Noted During Treatment: None  Total Treatment Time (min): 133    Adverse Event: no    I was present on these premises and immediately available to furnish assistance & direction throughout the procedure. Laura      Patricia Richards is a 54 y.o. female  did successfully complete today's hyperbaric oxygen treatment at [address] [place] [address].     In my clinical judgement, ongoing HBO therapy is  necessary at this time, given a threat to patient function, limb or life from the current condition. Supervision and attendance of Hyperbaric Oxygen Therapy provided. Continue HBO treatment as outlined in the treatment plan. Hyperbaric Oxygen: Chinyere Suraj Rae tolerated Treatment Number: 4 well today without complications. Discharge Instructions were explained and given to Ms. Rae     Electronically signed by Brayan Brower MD on 4/22/2022 at 4:47 PM

## 2022-04-22 NOTE — PLAN OF CARE
Jovana Rinaldi RN   Registered Nurse   Specialty:  RN   Plan of Care      Signed   Date of Service:  4/18/2022  2:00 PM                 Signed              Show:Clear all  []Manual[x]Template[]Copied    Added by:  Rosy Couch RN      []Mario for details       Problem: Physical Regulation:  Goal: Tolerate HBO therapy and barotrauma will be prevented  Description: Tolerate HBO therapy and barotrauma will be prevented  Outcome: Ongoing

## 2022-04-22 NOTE — PROGRESS NOTES
[facility]  Hyperbaric Oxygen Therapy   Progress Note    NAME: Nohemi HOYOS [facility]  MEDICAL RECORD NUMBER:  [id number]  AGE: 54 y.o. 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 See doc flowsheets for documentation. Objective       No results for input(s): GLUMET in the last 72 hours. Pre treatment Vital Signs       Temp: 98 °F (36.7 °C)     Pulse: 74     Resp: 18     BP: 134/87     Post treatment Vital Signs  Temp: 98 °F (36.7 °C)  Pulse: 74  Resp: 18  BP: (!) 108/58  Assessment      Physical Exam:  General Appearance:  alert and oriented to person, place and time, well-developed and well-nourished, in no acute distress  ENT:  tympanic membranes intact bilaterally  Pulmonary/Chest:  clear to auscultation bilaterally- no wheezes, rales or rhonchi, normal air movement, no respiratory distress  Cardiovascular:  regular rate and rhythm  Chamber #: 39  Treatment Start Time: 1328     Pressure Reached Time: 1341  FADI : 2.5  Number of Air Breaks:  Treatment Status: Back to oxygen     Decompression Time: 1522   Treatment End Time: 1535  Length of Treatment: 90 Minutes  Symptoms Noted During Treatment: None  Total Treatment Time (min): 127    Adverse Event: no    I was present on these premises and immediately available to furnish assistance & direction throughout the procedure. Laura      Chrissy Burrell is a 54 y.o. female  did successfully complete today's hyperbaric oxygen treatment at [address] [place] [address].     In my clinical judgement, ongoing HBO therapy is  necessary at this time, given a threat to patient function, limb or life from the current condition. Supervision and attendance of Hyperbaric Oxygen Therapy provided. Continue HBO treatment as outlined in the treatment plan. Hyperbaric Oxygen: Viviana Rae tolerated Treatment Number: 3 well today without complications. Discharge Instructions were explained and given to Ms. Rae     Electronically signed by Zahida Han MD on 4/21/2022 at 8:53 PM

## 2022-04-25 ENCOUNTER — HOSPITAL ENCOUNTER (OUTPATIENT)
Dept: HYPERBARIC MEDICINE | Age: 52
Setting detail: THERAPIES SERIES
Discharge: HOME | End: 2022-04-25
Payer: COMMERCIAL

## 2022-04-25 DIAGNOSIS — T66.XXXS LATE EFFECT OF RADIATION: Primary | ICD-10-CM

## 2022-04-25 PROCEDURE — G0277 HBOT, FULL BODY CHAMBER, 30M: HCPCS

## 2022-04-25 PROCEDURE — 99183 HYPERBARIC OXYGEN THERAPY: CPT | Performed by: SURGERY

## 2022-04-25 NOTE — PLAN OF CARE
Hyperbaric Oxygen (HBO) Therapy    Apply to:  Goals Without End Date    All Goals  Physical   Regulation:  Tolerate HBO therapy and barotrauma will be   prevented    Problem Interventions    Assess for signs and symptoms of adverse events including confinement anxiety, pneumothorax, oxygen toxicity, and   barotrauma    Assess knowledge and expectations of HBO   therapy    Assess for history of confinement   anxiety    Notify health care provider regarding significant changes in patient's   condition    Provide comfort related to the HBO environment and equalizaton of middle   ear    Monitor for symptoms of   seizure

## 2022-04-26 ENCOUNTER — HOSPITAL ENCOUNTER (OUTPATIENT)
Dept: HYPERBARIC MEDICINE | Age: 52
Setting detail: THERAPIES SERIES
Discharge: HOME | End: 2022-04-26
Payer: COMMERCIAL

## 2022-04-26 VITALS
TEMPERATURE: 96.1 F | SYSTOLIC BLOOD PRESSURE: 138 MMHG | RESPIRATION RATE: 16 BRPM | DIASTOLIC BLOOD PRESSURE: 80 MMHG | HEART RATE: 76 BPM

## 2022-04-26 VITALS
TEMPERATURE: 96.7 F | RESPIRATION RATE: 18 BRPM | HEART RATE: 84 BPM | DIASTOLIC BLOOD PRESSURE: 80 MMHG | SYSTOLIC BLOOD PRESSURE: 144 MMHG
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
## 2022-04-26 DIAGNOSIS — T66.XXXS LATE EFFECT OF RADIATION: Primary | ICD-10-CM

## 2022-04-26 DIAGNOSIS — L59.8 RADIATION NECROSIS OF SKIN AND SUBCUTANEOUS: Chronic | ICD-10-CM

## 2022-04-26 PROCEDURE — G0277 HBOT, FULL BODY CHAMBER, 30M: HCPCS

## 2022-04-26 PROCEDURE — 99183 HYPERBARIC OXYGEN THERAPY: CPT | Performed by: SURGERY

## 2022-04-26 NOTE — PROGRESS NOTES
[facility]  Hyperbaric Oxygen Therapy   Progress Note    NAME: Ramakrishna HOYOS [facility]  MEDICAL RECORD NUMBER:  [id number]  AGE: 54 y.o. 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 See doc flowsheets for documentation. Objective       No results for input(s): GLUMET in the last 72 hours. Pre treatment Vital Signs       Temp: 97.6 °F (36.4 °C)     Pulse: 80     Resp: 18     BP: (!) 140/84     Post treatment Vital Signs  Temp: 96.7 °F (35.9 °C)  Pulse: 84  Resp: 18  BP: (!) 144/80  Assessment      Physical Exam:  General Appearance:  alert and oriented to person, place and time, well-developed and well-nourished, in no acute distress  ENT:  tympanic membranes intact bilaterally  Pulmonary/Chest:  clear to auscultation bilaterally- no wheezes, rales or rhonchi, normal air movement, no respiratory distress  Cardiovascular:  regular rate and rhythm  Chamber #: 39  Treatment Start Time: 1400     Pressure Reached Time: 1414  FADI : 2.5  Number of Air Breaks:  Treatment Status: Back to oxygen     Decompression Time: 1557   Treatment End Time: 1612  Length of Treatment: 90 Minutes  Symptoms Noted During Treatment: None  Total Treatment Time (min): 132    Adverse Event: no    I was present on these premises and immediately available to furnish assistance & direction throughout the procedure. Laura      Sandra Parsons is a 54 y.o. female  did successfully complete today's hyperbaric oxygen treatment at [address] [place] [address].     In my clinical judgement, ongoing HBO therapy is  necessary at this time, given a

## 2022-04-27 ENCOUNTER — HOSPITAL ENCOUNTER (OUTPATIENT)
Dept: WOUND CARE | Age: 52
Discharge: HOME | End: 2022-04-27
Payer: COMMERCIAL

## 2022-04-27 ENCOUNTER — HOSPITAL ENCOUNTER (OUTPATIENT)
Dept: HYPERBARIC MEDICINE | Age: 52
Setting detail: THERAPIES SERIES
Discharge: HOME | End: 2022-04-27
Payer: COMMERCIAL

## 2022-04-27 VITALS
HEART RATE: 76 BPM | DIASTOLIC BLOOD PRESSURE: 70 MMHG | SYSTOLIC BLOOD PRESSURE: 134 MMHG | TEMPERATURE: 97.6 F | RESPIRATION RATE: 18 BRPM

## 2022-04-27 VITALS
DIASTOLIC BLOOD PRESSURE: 88 MMHG | TEMPERATURE: 97.6 F | WEIGHT: 145 LBS | SYSTOLIC BLOOD PRESSURE: 140 MMHG | RESPIRATION RATE: 18 BRPM | HEIGHT: 58 IN | BODY MASS INDEX: 30.44 KG/M2 | HEART RATE: 94 BPM

## 2022-04-27 DIAGNOSIS — T66.XXXS LATE EFFECT OF RADIATION: ICD-10-CM
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
## 2022-04-27 DIAGNOSIS — T66.XXXS LATE EFFECT OF RADIATION: Primary | ICD-10-CM

## 2022-04-27 DIAGNOSIS — L98.499 ULCER OF SKIN OF BREAST (HCC): Primary | ICD-10-CM

## 2022-04-27 DIAGNOSIS — L59.8 RADIATION NECROSIS OF SKIN AND SUBCUTANEOUS: Chronic | ICD-10-CM

## 2022-04-27 DIAGNOSIS — T81.30XA WOUND DEHISCENCE: ICD-10-CM

## 2022-04-27 PROCEDURE — G0277 HBOT, FULL BODY CHAMBER, 30M: HCPCS

## 2022-04-27 PROCEDURE — 97597 DBRDMT OPN WND 1ST 20 CM/<: CPT | Performed by: SURGERY

## 2022-04-27 PROCEDURE — 97597 DBRDMT OPN WND 1ST 20 CM/<: CPT

## 2022-04-27 PROCEDURE — 99183 HYPERBARIC OXYGEN THERAPY: CPT | Performed by: SURGERY

## 2022-04-27 RX ORDER — GINSENG 100 MG
CAPSULE ORAL ONCE
Status: CANCELLED | OUTPATIENT
Start: 2022-04-27 | End: 2022-04-27

## 2022-04-27 RX ORDER — GENTAMICIN SULFATE 1 MG/G
OINTMENT TOPICAL ONCE
Status: CANCELLED | OUTPATIENT
Start: 2022-04-27 | End: 2022-04-27

## 2022-04-27 RX ORDER — BACITRACIN ZINC AND POLYMYXIN B SULFATE 500; 1000 [USP'U]/G; [USP'U]/G
OINTMENT TOPICAL ONCE
Status: CANCELLED | OUTPATIENT
Start: 2022-04-27 | End: 2022-04-27

## 2022-04-27 RX ORDER — LIDOCAINE HYDROCHLORIDE 40 MG/ML
SOLUTION TOPICAL ONCE
Status: COMPLETED | OUTPATIENT
Start: 2022-04-27 | End: 2022-04-27

## 2022-04-27 RX ORDER — LIDOCAINE HYDROCHLORIDE 40 MG/ML
SOLUTION TOPICAL ONCE
Status: CANCELLED | OUTPATIENT
Start: 2022-04-27 | End: 2022-04-27

## 2022-04-27 RX ORDER — LIDOCAINE 50 MG/G
OINTMENT TOPICAL ONCE
Status: CANCELLED | OUTPATIENT
Start: 2022-04-27 | End: 2022-04-27

## 2022-04-27 RX ORDER — BACITRACIN, NEOMYCIN, POLYMYXIN B 400; 3.5; 5 [USP'U]/G; MG/G; [USP'U]/G
OINTMENT TOPICAL ONCE
Status: CANCELLED | OUTPATIENT
Start: 2022-04-27 | End: 2022-04-27

## 2022-04-27 RX ORDER — BETAMETHASONE DIPROPIONATE 0.05 %
OINTMENT (GRAM) TOPICAL ONCE
Status: CANCELLED | OUTPATIENT
Start: 2022-04-27 | End: 2022-04-27

## 2022-04-27 RX ORDER — CLOBETASOL PROPIONATE 0.5 MG/G
OINTMENT TOPICAL ONCE
Status: CANCELLED | OUTPATIENT
Start: 2022-04-27 | End: 2022-04-27

## 2022-04-27 RX ORDER — LIDOCAINE HYDROCHLORIDE 20 MG/ML
JELLY TOPICAL ONCE
Status: CANCELLED | OUTPATIENT
Start: 2022-04-27 | End: 2022-04-27

## 2022-04-27 RX ORDER — LIDOCAINE 40 MG/G
CREAM TOPICAL ONCE
Status: CANCELLED | OUTPATIENT
Start: 2022-04-27 | End: 2022-04-27

## 2022-04-27 RX ADMIN — LIDOCAINE HYDROCHLORIDE: 40 SOLUTION TOPICAL at 13:38

## 2022-04-27 NOTE — PLAN OF CARE
Problem: Wound:  Goal: Will show signs of wound healing; wound closure and no evidence of infection  Description: Will show signs of wound healing; wound closure and no evidence of infection  Outcome: Adequate for Discharge

## 2022-04-27 NOTE — PROGRESS NOTES
Wound Healing Center Followup Visit Note    Referring Physician : Marco Becton, MD Sheryle Sera [facility]  MEDICAL RECORD NUMBER:  [id number]  AGE: 54 y.o. GENDER: female  : 1970  EPISODE DATE:  2022    Subjective:     Chief Complaint   Patient presents with    Wound Check     right breast      HISTORY of PRESENT ILLNESS HPI   Claribel Doyle is a 54 y.o. female who presents today in regards to follow up evaluation and treatment of wound/ulcer. That patient's past medical, family and social hx were reviewed and changes were made if present. History of Wound Context:  Patient has had a wound of her right chest since 2022. She had her mastectomy wound dehisce. She had her first breast cancer at age 30.  She had undergone a lumpectomy with reexcision for margins and radiation at that time first at [facility] and then up at [facility]. Adriana Sanabria was estrogen progesterone positive and HER-2 negative and was placed on tamoxifen then for 5 years. She had a recurrence in . It was estrogen progesterone negative and HER-2 positive.  She underwent an MRI of her breast and the tumor was only 1.6 cm in size.  Due to the recurrence she also underwent CAT scans and bone scans which were negative.  Due to the tumor being less than 2 cm in size we took her to the OR and she elected for bilateral mastectomy.  [place] lymph node was attempted but it turned into a right modified radical mastectomy. F[place]on Misael final pathology showed that she had multiple lymph nodes that were positive.  So she went into surgery and a stage Ia tumor and came out of surgery as a stage IIIa. She underwent PET scan in October which showed postoperative change.      At her second postoperative appointment she had a lot of drainage through the wound despite the FABIO drain that had been left.  And she dehisced in the middle to lateral aspect of the wound.  She was treated with wet-to-dry dressings.  When she got off her chemotherapy and onto immunotherapy she went back to surgery in 2/2022 for debridement and closure.  The wound was able to be closed but it had a little bit of tension medially.  She developed a seroma.      She has had a wound vac since ~ 2/2022. All of her sutures have been removed. On their initial visit to the wound healing center, 3/30/22  the patient has noted that the wound has been stable. The patient has not had similar previous wounds in the past.      Pt is not on abx at time of initial visit.     3/30/22  · Culture done  · aquacell packing  · Stop wound vac  · Screen for hbo  · Labs and cxr ordered   4/6/22  · Wound improved  4/13/22  · Improved  4/27/22  · Improved - at 7 oclcok - small area - pack  · Started hbo  · Some drainage from higher up towards axilla     Wound/Ulcer Pain Timing/Severity: none  Quality of pain: N/A  Severity:  0 / 10   Modifying Factors: None  Associated Signs/Symptoms: drainage    Ulcer Identification:  Ulcer Type: non-healing surgical, soft tissue radionecrosis, compromised skin graft/flap and necrosis radiation  Contributing Factors: edema and decreased tissue oxygenation    Diabetic/Pressure/Non Pressure Ulcers only:  Ulcer: Non-Pressure ulcer, fat layer exposed  Wound: Wound dehiscence        PAST MEDICAL HISTORY      Diagnosis Date    Cancer [facility]) 2008    right breast    Late effect of radiation 3/30/2022    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    Ulcer of skin of breast ([facility] [address] 3/30/2022     Past Surgical History:   Procedure Laterality Date    BREAST BIOPSY Bilateral 7/6/2021    RIGHT MODIFIED RADICAL MASTECTOMY,  LEFT BREAST PROPHYLATIC MASTECTOMY performed by Alexys Lopez MD at . [facility] LUMPECTOMY Right     CARPAL TUNNEL RELEASE Right     CHEST WALL RESECTION Right 2/8/2022    RIGHT CHEST WALL COMPLEX WOUND CLOSURE AND WOUND VAC performed by Alexys Lopez MD at [facility] 43 Left 7/6/2021    LEFT SUBCLAVIAN MEDI PORT INSERTION performed by Tan Kelly MD at [facility]       Family History   Problem Relation Age of Onset    Cancer Mother         lung     Social History     Tobacco Use    Smoking status: Former Smoker     Packs/day: 0.50    Smokeless tobacco: Former User     Quit date: 3/9/2022    Tobacco comment: 1/2 pack or less    Vaping Use    Vaping Use: Never used   Substance Use Topics    Alcohol use: Yes     Comment: occasionaly    Drug use: Never     Allergies   Allergen Reactions    Meperidine     Morphine     Sulfa Antibiotics Nausea And Vomiting and Other (See Comments)     fever     Current Outpatient Medications on File Prior to Encounter   Medication Sig Dispense Refill    Multiple Vitamins-Minerals (THERAPEUTIC MULTIVITAMIN-MINERALS) tablet Take 1 tablet by mouth daily      BIOTIN PO Take by mouth      NONFORMULARY Inject into the muscle every 21 days Chemo cocktail:  Pertuzumab, Trastuzumab, Hyaluronidase  Goes to [facility] for the injection       No current facility-administered medications on file prior to encounter.        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)    Ulcer of skin of breast ([facility] [address] - Primary (Chronic)    Relevant Orders    Initiate Outpatient Wound Care Protocol    Late effect of radiation (Chronic)    Relevant Orders    Initiate Outpatient Wound Care Protocol    Wound dehiscence    Relevant Orders    Initiate Outpatient Wound Care Protocol          Pre Debridement Measurements:  Are located in the Erica Jayme  Documentation Flow Sheet  Post Debridement Measurements:  Wound/Ulcer Descriptions are Pre Debridement except measurements:     Wound 03/30/22 Breast Right #1 (Active)   Wound Image   04/27/22 1313   Dressing Status New dressing applied 04/20/22 1351   Wound Cleansed Cleansed with saline 04/20/22 1351   Dressing/Treatment Alginate;Dry dressing 04/20/22 1351   Offloading for Diabetic Foot Ulcers Offloading not required 04/06/22 1449   Wound Length (cm) 0.3 cm 04/27/22 1313   Wound Width (cm) 1.2 cm 04/27/22 1313   Wound Depth (cm) 0.2 cm 04/27/22 1313   Wound Surface Area (cm^2) 0.36 cm^2 04/27/22 1313   Change in Wound Size % (l*w) 44.62 04/27/22 1313   Wound Volume (cm^3) 0.072 cm^3 04/27/22 1313   Wound Healing % 63 04/27/22 1313   Post-Procedure Length (cm) 0.4 cm 04/27/22 1337   Post-Procedure Width (cm) 1.3 cm 04/27/22 1337   Post-Procedure Depth (cm) 0.2 cm 04/27/22 1337   Post-Procedure Surface Area (cm^2) 0.52 cm^2 04/27/22 1337   Post-Procedure Volume (cm^3) 0.104 cm^3 04/27/22 1337   Undermining Starts ___ O'Clock 3 04/06/22 1402   Undermining Ends___ O'Clock 8 04/06/22 1402   Undermining Maxium Distance (cm) 0.5 @ 7 04/06/22 1402   Wound Assessment Fibrin;Pink/red 04/27/22 1313   Drainage Amount Moderate 04/27/22 1313   Drainage Description Yellow 04/27/22 1313   Odor None 04/27/22 1313   Ling-wound Assessment Intact 04/27/22 1313   Number of days: 28     Incision 07/06/21 Breast Right;Medial (Active)   Number of days: 294       Incision 02/08/22 Chest Right (Active)   Number of days: 78     Procedure Note  Indications:  Based on my examination of this patient's wound(s)/ulcer(s) today, debridement is required to promote healing and evaluate the wound base.     Performed by: Nicolasa Mathias MD    Consent obtained:  Yes    Time out taken:  Yes    Pain Control: Anesthetic  Anesthetic: 4% Lidocaine Liquid Topical     Debridement:Non-excisional Debridement    Using curette the wound(s)/ulcer(s) was/were sharply debrided down through and including the removal of epidermis and dermis. Devitalized Tissue Debrided:  fibrin, biofilm, slough and exudate to stimulate bleeding to promote healing, post debridement good bleeding base and wound edges noted    Wound/Ulcer #: 1    Percent of Wound/Ulcer Debrided: 100%    Total Surface Area Debrided:  0.36 sq cm     Estimated Blood Loss:  Minimal  Hemostasis Achieved:  by pressure    Procedural Pain:  2  / 10   Post Procedural Pain:  1 / 10     Response to treatment:  Well tolerated by patient. Plan:   Treatment Note please see attached Discharge Instructions    Written patient dismissal instructions given to patient and signed by patient or POA. Discharge Instructions       Visit Discharge/Physician Orders     Discharge condition: Stable     Assessment of pain at discharge: mild     Anesthetic used: lido 4%     Discharge to: Home     Left via:Private automobile     Accompanied by: accompanied by self     ECF/HHA: [facility]      Dressing Orders: To right breast wound: Cleanse with normal saline, pack loosely with aquacel rope, cover and secure with dry dressing. Change daily. Remove aquacel dry, do not wet to remove.      Treatment Orders: Eat a diet high in protein and vitamin C. Take a multiple vitamin daily unless contraindicated.     Do not get wet.       hyperbaric oxygen treatment     [facility] followup visit  1 week_________________________  (Please note your next appointment above and if you are unable to keep, kindly give a 24 hour notice.  Thank you.)     Physician signature:__________________________        If you experience any of the following, please call the [address] [place] [address] Road during business hours:     * Increase in Pain  * Temperature over 101  * Increase in drainage from your wound  * Drainage with a foul odor  * Bleeding  * Increase in swelling  * Need for compression bandage changes due to slippage, breakthrough drainage.     If you need medical attention outside of the business hours of the [address] [place] [address] Road please contact your PCP or go to the nearest emergency room.              Electronically signed by Cesar Bundy MD on 4/27/2022 at 1:42 PM

## 2022-04-28 ENCOUNTER — HOSPITAL ENCOUNTER (OUTPATIENT)
Dept: HYPERBARIC MEDICINE | Age: 52
Setting detail: THERAPIES SERIES
Discharge: HOME | End: 2022-04-28
Payer: COMMERCIAL

## 2022-04-28 VITALS
TEMPERATURE: 96.8 F | HEART RATE: 72 BPM | SYSTOLIC BLOOD PRESSURE: 140 MMHG | RESPIRATION RATE: 16 BRPM | DIASTOLIC BLOOD PRESSURE: 66 MMHG

## 2022-04-28 DIAGNOSIS — T66.XXXS LATE EFFECT OF RADIATION: Primary | ICD-10-CM

## 2022-04-28 PROCEDURE — 99183 HYPERBARIC OXYGEN THERAPY: CPT | Performed by: SURGERY

## 2022-04-28 PROCEDURE — G0277 HBOT, FULL BODY CHAMBER, 30M: HCPCS

## 2022-04-29 ENCOUNTER — HOSPITAL ENCOUNTER (OUTPATIENT)
Dept: HYPERBARIC MEDICINE | Age: 52
Setting detail: THERAPIES SERIES
Discharge: HOME | End: 2022-04-29
Payer: COMMERCIAL

## 2022-04-29 VITALS
HEART RATE: 84 BPM | DIASTOLIC BLOOD PRESSURE: 85 MMHG | TEMPERATURE: 96.7 F | SYSTOLIC BLOOD PRESSURE: 134 MMHG | RESPIRATION RATE: 18 BRPM
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
## 2022-04-29 DIAGNOSIS — T66.XXXS LATE EFFECT OF RADIATION: Primary | ICD-10-CM

## 2022-04-29 DIAGNOSIS — L59.8 RADIATION NECROSIS OF SKIN AND SUBCUTANEOUS: Chronic | ICD-10-CM

## 2022-04-29 PROCEDURE — G0277 HBOT, FULL BODY CHAMBER, 30M: HCPCS

## 2022-04-29 PROCEDURE — 99183 HYPERBARIC OXYGEN THERAPY: CPT | Performed by: SURGERY

## 2022-04-29 NOTE — PROGRESS NOTES
Burak Marc is a 54 y.o. female has been receiving hyperbaric oxygen treatment for management of: Indication  Indications: Late Effect of Radiation (Right breast ). Ms. Sylvia Uribe has completed Treatment Number: 5 out of a treatment protocol of Total Treatments: 20.    Problem List:  Patient Active Problem List   Diagnosis Code    Malignant neoplasm of central portion of right breast in female, estrogen receptor negative ([facility] [address] C50.111, Z17.1    Recurrent breast cancer, right ([address] C50.911    S/P mastectomy, bilateral Z90.13    Wound dehiscence T81.30XA    Radiation necrosis of skin and subcutaneous L59.8, Y84.2    Ulcer of skin of breast ([facility] [address] L98.499    Late effect of radiation T66. XXXS       Patients ID was verified. Hyperbaric oxygen therapy plan of care, patient history, outpatient fall risk assessment, nutritional assessment and daily medications were reviewed, addressed and updated as needed. Pt is tolerating hyperbaric oxygen therapy  well without complications.          George Lovett RN  4/25/2022  3:18 PM
condition. Supervision and attendance of Hyperbaric Oxygen Therapy provided. Continue HBO treatment as outlined in the treatment plan. Hyperbaric Oxygen: Vinnie Rae tolerated Treatment Number: 5 well today without complications. Discharge Instructions were explained and given to Ms. Rae by HBOT staff    Electronically signed by Brian Chen MD

## 2022-04-30 NOTE — PROGRESS NOTES
[facility]  Hyperbaric Oxygen Therapy   Progress Note    NAME: Balaji HOYOS [facility]  MEDICAL RECORD NUMBER:  [id number]  AGE: 54 y.o. 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 See doc flowsheets for documentation. Objective       No results for input(s): GLUMET in the last 72 hours. Pre treatment Vital Signs       Temp: 96.9 °F (36.1 °C)     Pulse: 96     Resp: 16     BP: 124/68     Post treatment Vital Signs  Temp: 96.7 °F (35.9 °C)  Pulse: 84  Resp: 18  BP: 134/85  Assessment      Physical Exam:  General Appearance:  alert and oriented to person, place and time, well-developed and well-nourished, in no acute distress  ENT:  tympanic membranes intact bilaterally  Pulmonary/Chest:  clear to auscultation bilaterally- no wheezes, rales or rhonchi, normal air movement, no respiratory distress  Cardiovascular:  regular rate and rhythm  Chamber #: 38  Treatment Start Time: 1337     Pressure Reached Time: 1351  FADI : 2.5  Number of Air Breaks:  Treatment Status: Back to oxygen     Decompression Time: 1532   Treatment End Time: 5327  Length of Treatment: 90 Minutes  Symptoms Noted During Treatment: None  Total Treatment Time (min): 127    Adverse Event: no    I was present on these premises and immediately available to furnish assistance & direction throughout the procedure. Laura      Megha Santiago is a 54 y.o. female  did successfully complete today's hyperbaric oxygen treatment at [address] [place] [address]. In my clinical judgement, ongoing HBO therapy is  necessary at this time, given a threat to patient function, limb or life from the current condition.       Supervision

## 2022-05-02 ENCOUNTER — HOSPITAL ENCOUNTER (OUTPATIENT)
Dept: HYPERBARIC MEDICINE | Age: 52
Setting detail: THERAPIES SERIES
Discharge: HOME | End: 2022-05-02
Payer: COMMERCIAL

## 2022-05-02 VITALS
DIASTOLIC BLOOD PRESSURE: 74 MMHG | HEART RATE: 77 BPM | SYSTOLIC BLOOD PRESSURE: 118 MMHG | RESPIRATION RATE: 18 BRPM | TEMPERATURE: 97.1 F

## 2022-05-02 DIAGNOSIS — T66.XXXS LATE EFFECT OF RADIATION: Primary | ICD-10-CM

## 2022-05-02 PROCEDURE — 99183 HYPERBARIC OXYGEN THERAPY: CPT | Performed by: SURGERY

## 2022-05-02 PROCEDURE — G0277 HBOT, FULL BODY CHAMBER, 30M: HCPCS

## 2022-05-03 ENCOUNTER — HOSPITAL ENCOUNTER (OUTPATIENT)
Dept: HYPERBARIC MEDICINE | Age: 52
Setting detail: THERAPIES SERIES
Discharge: HOME | End: 2022-05-03
Payer: COMMERCIAL

## 2022-05-03 VITALS
SYSTOLIC BLOOD PRESSURE: 144 MMHG | RESPIRATION RATE: 17 BRPM | TEMPERATURE: 97.1 F | HEART RATE: 81 BPM | DIASTOLIC BLOOD PRESSURE: 86 MMHG

## 2022-05-03 DIAGNOSIS — T66.XXXS LATE EFFECT OF RADIATION: Primary | ICD-10-CM

## 2022-05-03 PROCEDURE — G0277 HBOT, FULL BODY CHAMBER, 30M: HCPCS

## 2022-05-03 PROCEDURE — 99183 HYPERBARIC OXYGEN THERAPY: CPT | Performed by: SURGERY

## 2022-05-03 NOTE — PROGRESS NOTES
[facility]  Hyperbaric Oxygen Therapy   Progress Note    NAME: Sheryle Sera COOK [facility]  MEDICAL RECORD NUMBER:  [id number]  AGE: 54 y.o. 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 See doc flowsheets for documentation. Objective       No results for input(s): GLUMET in the last 72 hours. Pre treatment Vital Signs       Temp: 97.6 °F (36.4 °C)     Pulse: 71     Resp: 16     BP: (!) 142/86     Post treatment Vital Signs  Temp: 97.1 °F (36.2 °C)  Pulse: 81  Resp: 17  BP: (!) 144/86  Assessment      Physical Exam:  General Appearance:  alert and oriented to person, place and time, well-developed and well-nourished, in no acute distress  ENT:  tympanic membranes intact bilaterally  Pulmonary/Chest:  clear to auscultation bilaterally- no wheezes, rales or rhonchi, normal air movement, no respiratory distress  Cardiovascular:  regular rate and rhythm  Chamber #: 38  Treatment Start Time: 1353     Pressure Reached Time: 1407  FADI : 2.5  Number of Air Breaks:  Treatment Status: Air break X 2 for 5 minutes each     Decompression Time: 1550   Treatment End Time: 1600  Length of Treatment: 90 Minutes  Symptoms Noted During Treatment: None  Total Treatment Time (min): 127    Adverse Event: no    I was present on these premises and immediately available to furnish assistance & direction throughout the procedure. Laura      Claribel Doyle is a 54 y.o. female  did successfully complete today's hyperbaric oxygen treatment at [address] [place] [address].     In my clinical judgement, ongoing HBO therapy is  necessary at this time, given a threat to patient function, limb or life from the current condition. Supervision and attendance of Hyperbaric Oxygen Therapy provided. Continue HBO treatment as outlined in the treatment plan. Hyperbaric Oxygen: Greta Rae tolerated Treatment Number: 51 well today without complications. Discharge Instructions were explained and given to Ms. Rae     Electronically signed by Don Jeffrey MD on 5/3/2022 at 5:55 PM

## 2022-05-04 ENCOUNTER — HOSPITAL ENCOUNTER (OUTPATIENT)
Dept: HYPERBARIC MEDICINE | Age: 52
Setting detail: THERAPIES SERIES
Discharge: HOME | End: 2022-05-04
Payer: COMMERCIAL

## 2022-05-04 ENCOUNTER — HOSPITAL ENCOUNTER (OUTPATIENT)
Dept: WOUND CARE | Age: 52
Discharge: HOME | End: 2022-05-04
Payer: COMMERCIAL

## 2022-05-04 VITALS
SYSTOLIC BLOOD PRESSURE: 132 MMHG | HEART RATE: 84 BPM | BODY MASS INDEX: 30.44 KG/M2 | HEIGHT: 58 IN | RESPIRATION RATE: 16 BRPM | WEIGHT: 145 LBS | DIASTOLIC BLOOD PRESSURE: 72 MMHG | TEMPERATURE: 96.7 F

## 2022-05-04 VITALS
SYSTOLIC BLOOD PRESSURE: 134 MMHG | HEART RATE: 80 BPM | RESPIRATION RATE: 16 BRPM | TEMPERATURE: 97 F | DIASTOLIC BLOOD PRESSURE: 70 MMHG

## 2022-05-04 DIAGNOSIS — T66.XXXS LATE EFFECT OF RADIATION: Primary | ICD-10-CM

## 2022-05-04 DIAGNOSIS — T66.XXXS LATE EFFECT OF RADIATION: ICD-10-CM

## 2022-05-04 DIAGNOSIS — L98.499 ULCER OF SKIN OF BREAST (HCC): Primary | ICD-10-CM

## 2022-05-04 DIAGNOSIS — T81.30XA WOUND DEHISCENCE: ICD-10-CM
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
## 2022-05-04 DIAGNOSIS — L59.8 RADIATION NECROSIS OF SKIN AND SUBCUTANEOUS: Chronic | ICD-10-CM

## 2022-05-04 PROCEDURE — 99212 OFFICE O/P EST SF 10 MIN: CPT

## 2022-05-04 PROCEDURE — 99183 HYPERBARIC OXYGEN THERAPY: CPT | Performed by: SURGERY

## 2022-05-04 PROCEDURE — 6370000000 HC RX 637 (ALT 250 FOR IP): Performed by: SURGERY

## 2022-05-04 PROCEDURE — G0277 HBOT, FULL BODY CHAMBER, 30M: HCPCS

## 2022-05-04 PROCEDURE — 97597 DBRDMT OPN WND 1ST 20 CM/<: CPT | Performed by: SURGERY

## 2022-05-04 RX ORDER — LIDOCAINE HYDROCHLORIDE 40 MG/ML
SOLUTION TOPICAL ONCE
Status: COMPLETED | OUTPATIENT
Start: 2022-05-04 | End: 2022-05-04

## 2022-05-04 RX ORDER — GINSENG 100 MG
CAPSULE ORAL ONCE
Status: CANCELLED | OUTPATIENT
Start: 2022-05-04 | End: 2022-05-04

## 2022-05-04 RX ORDER — LIDOCAINE 40 MG/G
CREAM TOPICAL ONCE
Status: CANCELLED | OUTPATIENT
Start: 2022-05-04 | End: 2022-05-04

## 2022-05-04 RX ORDER — CLOBETASOL PROPIONATE 0.5 MG/G
OINTMENT TOPICAL ONCE
Status: CANCELLED | OUTPATIENT
Start: 2022-05-04 | End: 2022-05-04

## 2022-05-04 RX ORDER — BACITRACIN ZINC AND POLYMYXIN B SULFATE 500; 1000 [USP'U]/G; [USP'U]/G
OINTMENT TOPICAL ONCE
Status: CANCELLED | OUTPATIENT
Start: 2022-05-04 | End: 2022-05-04

## 2022-05-04 RX ORDER — GENTAMICIN SULFATE 1 MG/G
OINTMENT TOPICAL ONCE
Status: CANCELLED | OUTPATIENT
Start: 2022-05-04 | End: 2022-05-04

## 2022-05-04 RX ORDER — LIDOCAINE HYDROCHLORIDE 40 MG/ML
SOLUTION TOPICAL ONCE
Status: CANCELLED | OUTPATIENT
Start: 2022-05-04 | End: 2022-05-04

## 2022-05-04 RX ORDER — LIDOCAINE HYDROCHLORIDE 20 MG/ML
JELLY TOPICAL ONCE
Status: CANCELLED | OUTPATIENT
Start: 2022-05-04 | End: 2022-05-04

## 2022-05-04 RX ORDER — LIDOCAINE 50 MG/G
OINTMENT TOPICAL ONCE
Status: CANCELLED | OUTPATIENT
Start: 2022-05-04 | End: 2022-05-04

## 2022-05-04 RX ORDER — BACITRACIN, NEOMYCIN, POLYMYXIN B 400; 3.5; 5 [USP'U]/G; MG/G; [USP'U]/G
OINTMENT TOPICAL ONCE
Status: CANCELLED | OUTPATIENT
Start: 2022-05-04 | End: 2022-05-04

## 2022-05-04 RX ORDER — BETAMETHASONE DIPROPIONATE 0.05 %
OINTMENT (GRAM) TOPICAL ONCE
Status: CANCELLED | OUTPATIENT
Start: 2022-05-04 | End: 2022-05-04

## 2022-05-04 RX ADMIN — LIDOCAINE HYDROCHLORIDE 3 ML: 40 SOLUTION TOPICAL at 13:35

## 2022-05-05 ENCOUNTER — HOSPITAL ENCOUNTER (OUTPATIENT)
Dept: HYPERBARIC MEDICINE | Age: 52
Setting detail: THERAPIES SERIES
Discharge: HOME | End: 2022-05-05
Payer: COMMERCIAL

## 2022-05-05 VITALS
SYSTOLIC BLOOD PRESSURE: 118 MMHG | HEART RATE: 75 BPM | DIASTOLIC BLOOD PRESSURE: 72 MMHG | TEMPERATURE: 97.1 F | RESPIRATION RATE: 19 BRPM

## 2022-05-05 DIAGNOSIS — T66.XXXS LATE EFFECT OF RADIATION: Primary | ICD-10-CM

## 2022-05-05 PROCEDURE — 99183 HYPERBARIC OXYGEN THERAPY: CPT | Performed by: PHYSICIAN ASSISTANT

## 2022-05-05 PROCEDURE — G0277 HBOT, FULL BODY CHAMBER, 30M: HCPCS

## 2022-05-05 NOTE — PROGRESS NOTES
[facility]  Hyperbaric Oxygen Therapy   Progress Note    NAME: Chinyere HOYOS [facility]  MEDICAL RECORD NUMBER:  [id number]  AGE: 54 y.o. 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 See doc flowsheets for documentation. Objective       No results for input(s): GLUMET in the last 72 hours. Pre treatment Vital Signs       Temp: 97.1 °F (36.2 °C)     Pulse: 90     Resp: 19     BP: 138/82     Post treatment Vital Signs  Temp: 97.1 °F (36.2 °C)  Pulse: 75  Resp: 19  BP: 118/72  Assessment      Physical Exam:  General Appearance:  alert and oriented to person, place and time, well-developed and well-nourished, in no acute distress  ENT:  tympanic membranes intact bilaterally  Pulmonary/Chest:  clear to auscultation bilaterally- no wheezes, rales or rhonchi, normal air movement, no respiratory distress  Cardiovascular:  regular rate and rhythm  Chamber #: 38  Treatment Start Time: 1321     Pressure Reached Time: 1337  FADI : 2.5  Number of Air Breaks:  Treatment Status: Air break X 2 for 5 minutes each     Decompression Time: 1516   Treatment End Time: 1531  Length of Treatment: 90 Minutes  Symptoms Noted During Treatment: None  Total Treatment Time (min): 130    Adverse Event: no    I was present on these premises and immediately available to furnish assistance & direction throughout the procedure. Laura      Nikia Quach is a 54 y.o. female  did successfully complete today's hyperbaric oxygen treatment at [address] [place] [address]. In my clinical judgement, ongoing HBO therapy is  necessary at this time, given a threat to patient function, limb or life from the current condition.       Supervision and attendance of Hyperbaric Oxygen Therapy provided. Continue HBO treatment as outlined in the treatment plan. Hyperbaric Oxygen: Jonathan Rae tolerated Treatment Number: 19 well today without complications. Discharge Instructions were explained and given to Ms. Rae     Electronically signed by Timoteo Keene PA-C on 5/5/2022 at 11:47 AM

## 2022-05-06 ENCOUNTER — HOSPITAL ENCOUNTER (OUTPATIENT)
Dept: HYPERBARIC MEDICINE | Age: 52
Setting detail: THERAPIES SERIES
Discharge: HOME | End: 2022-05-06
Payer: COMMERCIAL

## 2022-05-06 VITALS
RESPIRATION RATE: 16 BRPM | DIASTOLIC BLOOD PRESSURE: 66 MMHG | HEART RATE: 72 BPM | TEMPERATURE: 97.1 F | SYSTOLIC BLOOD PRESSURE: 110 MMHG

## 2022-05-06 DIAGNOSIS — T66.XXXS LATE EFFECT OF RADIATION: Primary | ICD-10-CM

## 2022-05-06 DIAGNOSIS — L59.8 RADIATION NECROSIS OF SKIN AND SUBCUTANEOUS: Chronic | ICD-10-CM
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
## 2022-05-06 PROCEDURE — G0277 HBOT, FULL BODY CHAMBER, 30M: HCPCS

## 2022-05-06 PROCEDURE — 99183 HYPERBARIC OXYGEN THERAPY: CPT | Performed by: SURGERY

## 2022-05-06 NOTE — PROGRESS NOTES
[facility]  Hyperbaric Oxygen Therapy   Progress Note    NAME: Sky HOYOS [facility]  MEDICAL RECORD NUMBER:  [id number]  AGE: 54 y.o. 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 See doc flowsheets for documentation. Objective       No results for input(s): GLUMET in the last 72 hours. Pre treatment Vital Signs       Temp: 97.6 °F (36.4 °C)     Pulse: 77     Resp: 18     BP: 120/88     Post treatment Vital Signs  Temp: 97.1 °F (36.2 °C)  Pulse: 72  Resp: 16  BP: 110/66  Assessment      Physical Exam:  General Appearance:  alert and oriented to person, place and time, well-developed and well-nourished, in no acute distress  ENT:  tympanic membranes intact bilaterally  Pulmonary/Chest:  clear to auscultation bilaterally- no wheezes, rales or rhonchi, normal air movement, no respiratory distress  Cardiovascular:  regular rate and rhythm  Chamber #: 38  Treatment Start Time: 1420     Pressure Reached Time: 1437  FADI : 2.5  Number of Air Breaks:  Treatment Status: Air break X 2 for 5 minutes each     Decompression Time: 1618   Treatment End Time: 4726  Length of Treatment: 90 Minutes  Symptoms Noted During Treatment: None  Total Treatment Time (min): 132    Adverse Event: no    I was present on these premises and immediately available to furnish assistance & direction throughout the procedure. Laura      Jason Wise is a 54 y.o. female  did successfully complete today's hyperbaric oxygen treatment at [address] [place] [address].     In my clinical judgement, ongoing HBO therapy is  necessary at this time, given a threat to patient function, limb or life from the current condition. Supervision and attendance of Hyperbaric Oxygen Therapy provided. Continue HBO treatment as outlined in the treatment plan. Hyperbaric Oxygen: Cristel Rae tolerated Treatment Number: 14 well today without complications. Discharge Instructions were explained and given to Ms. Rae     Electronically signed by Mino Duncan MD on 5/6/2022 at 5:17 PM

## 2022-05-06 NOTE — PROGRESS NOTES
Wound Healing Center Followup Visit Note    Referring Physician : Wynonia Carpen, MD [facility]  MEDICAL RECORD NUMBER:  [id number]  AGE: 54 y.o. GENDER: female  : 1970  EPISODE DATE:  2022    Subjective:     Chief Complaint   Patient presents with    Wound Check     right breast      HISTORY of PRESENT ILLNESS HPI   Dustin Lima is a 54 y.o. female who presents today in regards to follow up evaluation and treatment of wound/ulcer. That patient's past medical, family and social hx were reviewed and changes were made if present. History of Wound Context:  Patient has had a wound of her right chest since 2022. She had her mastectomy wound dehisce. She had her first breast cancer at age 30.  She had undergone a lumpectomy with reexcision for margins and radiation at that time first at [facility] and then up at [facility]. Jerrod Do was estrogen progesterone positive and HER-2 negative and was placed on tamoxifen then for 5 years. She had a recurrence in . It was estrogen progesterone negative and HER-2 positive.  She underwent an MRI of her breast and the tumor was only 1.6 cm in size.  Due to the recurrence she also underwent CAT scans and bone scans which were negative.  Due to the tumor being less than 2 cm in size we took her to the OR and she elected for bilateral mastectomy.  [place] lymph node was attempted but it turned into a right modified radical mastectomy. Rohini Overton final pathology showed that she had multiple lymph nodes that were positive.  So she went into surgery and a stage Ia tumor and came out of surgery as a stage IIIa. She underwent PET scan in October which showed postoperative change.      At her second postoperative appointment she had a lot of drainage through the wound despite the FABIO drain that had been left.  And she dehisced in the middle to lateral aspect of the wound.  She was treated with wet-to-dry dressings.  When she got off her chemotherapy and onto immunotherapy she went back to surgery in 2/2022 for debridement and closure.  The wound was able to be closed but it had a little bit of tension medially.  She developed a seroma.      She has had a wound vac since ~ 2/2022. All of her sutures have been removed. On their initial visit to the wound healing center, 3/30/22  the patient has noted that the wound has been stable. The patient has not had similar previous wounds in the past.      Pt is not on abx at time of initial visit.     3/30/22  · Culture done  · aquacell packing  · Stop wound vac  · Screen for hbo  · Labs and cxr ordered   4/6/22  · Wound improved  4/13/22  · Improved  4/27/22  · Improved - at 7 oclcok - small area - pack  · Started hbo  · Some drainage from higher up towards axilla  5/4/22  · Wound improved  · Continue hbo     Wound/Ulcer Pain Timing/Severity: none  Quality of pain: N/A  Severity:  0 / 10   Modifying Factors: None  Associated Signs/Symptoms: drainage    Ulcer Identification:  Ulcer Type: non-healing surgical, soft tissue radionecrosis, compromised skin graft/flap and necrosis radiation  Contributing Factors: edema and decreased tissue oxygenation    Diabetic/Pressure/Non Pressure Ulcers only:  Ulcer: Non-Pressure ulcer, fat layer exposed  Wound: Wound dehiscence        PAST MEDICAL HISTORY      Diagnosis Date    Cancer [facility]) 2008    right breast    Late effect of radiation 3/30/2022    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 [address] LUMPECTOMY Right     CARPAL TUNNEL RELEASE Right     CHEST WALL RESECTION Right 2/8/2022    RIGHT CHEST WALL COMPLEX WOUND CLOSURE AND WOUND VAC performed by Tan Kelly MD at [address] [facility]  PORT SURGERY Left 7/6/2021    LEFT SUBCLAVIAN MEDI PORT INSERTION performed by Evin Taylor MD at [facility]       Family History   Problem Relation Age of Onset    Cancer Mother         lung     Social History     Tobacco Use    Smoking status: Former Smoker     Packs/day: 0.50    Smokeless tobacco: Former User     Quit date: 3/9/2022    Tobacco comment: 1/2 pack or less    Vaping Use    Vaping Use: Never used   Substance Use Topics    Alcohol use: Yes     Comment: occasionaly    Drug use: Never     Allergies   Allergen Reactions    Meperidine     Morphine     Sulfa Antibiotics Nausea And Vomiting and Other (See Comments)     fever     Current Outpatient Medications on File Prior to Encounter   Medication Sig Dispense Refill    Multiple Vitamins-Minerals (THERAPEUTIC MULTIVITAMIN-MINERALS) tablet Take 1 tablet by mouth daily       BIOTIN PO Take by mouth       NONFORMULARY Inject into the muscle every 21 days Chemo cocktail:  Pertuzumab, Trastuzumab, Hyaluronidase  Goes to [facility] for the injection        No current facility-administered medications on file prior to encounter.        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)    Ulcer of skin of breast (HCC) - Primary (Chronic)    Late effect of radiation (Chronic)    Wound dehiscence          Pre Debridement Measurements:  Are located in the [place]  Documentation Flow Sheet  Post Debridement Measurements:  Wound/Ulcer Descriptions are Pre Debridement except measurements:     Wound 03/30/22 Breast Right #1 (Active)   Wound Image   04/27/22 1313   Dressing Status New dressing applied 05/04/22 1407   Wound Cleansed Cleansed with saline 05/04/22 1407   Dressing/Treatment Alginate;Collagen with Ag;Dry dressing 05/04/22 1407   Offloading for Diabetic Foot Ulcers Offloading not required 05/04/22 1407   Wound Length (cm) 0.2 cm 05/04/22 1331   Wound Width (cm) 0.2 cm 05/04/22 1331   Wound Depth (cm) 0.2 cm 05/04/22 1331   Wound Surface Area (cm^2) 0.04 cm^2 05/04/22 1331   Change in Wound Size % (l*w) 93.85 05/04/22 1331   Wound Volume (cm^3) 0.008 cm^3 05/04/22 1331   Wound Healing % 96 05/04/22 1331   Post-Procedure Length (cm) 0.4 cm 04/27/22 1337   Post-Procedure Width (cm) 1.3 cm 04/27/22 1337   Post-Procedure Depth (cm) 0.2 cm 04/27/22 1337   Post-Procedure Surface Area (cm^2) 0.52 cm^2 04/27/22 1337   Post-Procedure Volume (cm^3) 0.104 cm^3 04/27/22 1337   Undermining Starts ___ O'Clock 3 04/06/22 1402   Undermining Ends___ O'Clock 8 04/06/22 1402   Undermining Maxium Distance (cm) 0.5 @ 7 04/06/22 1402   Wound Assessment Fibrin 05/04/22 1331   Drainage Amount Small 05/04/22 1331   Drainage Description Yellow 05/04/22 1331   Odor None 05/04/22 1331   Ling-wound Assessment Intact 05/04/22 1331   Number of days: 37     Incision 07/06/21 Breast Right;Medial (Active)   Number of days: 304       Incision 02/08/22 Chest Right (Active)   Number of days: 87     Procedure Note  Indications:  Based on my examination of this patient's wound(s)/ulcer(s) today, debridement is required to promote healing and evaluate the wound base.     Performed by: Kathia Bone MD    Consent obtained:  Yes    Time out taken:  Yes    Pain Control: Anesthetic  Anesthetic: 4% Lidocaine Liquid Topical     Debridement:Non-excisional Debridement    Using curette the wound(s)/ulcer(s) was/were sharply debrided down through and including the removal of epidermis and dermis. Devitalized Tissue Debrided:  fibrin, biofilm, slough and exudate to stimulate bleeding to promote healing, post debridement good bleeding base and wound edges noted    Wound/Ulcer #: 1    Percent of Wound/Ulcer Debrided: 100%    Total Surface Area Debrided:  0.04 sq cm     Estimated Blood Loss:  Minimal  Hemostasis Achieved:  by pressure    Procedural Pain:  2 / 10   Post Procedural Pain:  1 / 10     Response to treatment:  Well tolerated by patient. Plan:   Treatment Note please see attached Discharge Instructions    Written patient dismissal instructions given to patient and signed by patient or POA. Discharge Instructions       Visit Discharge/Physician Orders     Discharge condition: Stable     Assessment of pain at discharge: mild     Anesthetic used: lido 4%     Discharge to: Home     Left via:Private automobile     Accompanied by: accompanied by self     ECF/HHA: [facility]      Dressing Orders: To right breast wound: Cleanse with normal saline, pack loosely with aquacel rope in upper right corner, in mid section pack with yosef, cover and secure with dry dressing. Change daily. Remove aquacel dry, do not wet to remove.      Treatment Orders: Eat a diet high in protein and vitamin C. Take a multiple vitamin daily unless contraindicated.     Do not get wet.       hyperbaric oxygen treatment     [facility] followup visit  1 week_________________________  (Please note your next appointment above and if you are unable to keep, kindly give a 24 hour notice.  Thank you.)     Physician signature:__________________________        If you experience any of the following, please call the [address] [place] [address] during business hours:     * Increase in Pain  * Temperature over 101  * Increase in drainage from your wound  * Drainage with a foul odor  * Bleeding  * Increase in swelling  * Need for compression bandage changes due to slippage, breakthrough drainage.     If you need medical attention outside of the business hours of the [facility] please contact your PCP or go to the nearest emergency room.              Electronically signed by Estrellita Ch MD

## 2022-05-09 ENCOUNTER — HOSPITAL ENCOUNTER (OUTPATIENT)
Dept: HYPERBARIC MEDICINE | Age: 52
Setting detail: THERAPIES SERIES
Discharge: HOME | End: 2022-05-09
Payer: COMMERCIAL

## 2022-05-09 VITALS
SYSTOLIC BLOOD PRESSURE: 140 MMHG | HEART RATE: 84 BPM | TEMPERATURE: 97.6 F | DIASTOLIC BLOOD PRESSURE: 80 MMHG | RESPIRATION RATE: 16 BRPM

## 2022-05-09 DIAGNOSIS — T66.XXXS LATE EFFECT OF RADIATION: Primary | ICD-10-CM

## 2022-05-09 PROCEDURE — G0277 HBOT, FULL BODY CHAMBER, 30M: HCPCS

## 2022-05-09 PROCEDURE — 99183 HYPERBARIC OXYGEN THERAPY: CPT | Performed by: SURGERY

## 2022-05-10 ENCOUNTER — HOSPITAL ENCOUNTER (OUTPATIENT)
Dept: HYPERBARIC MEDICINE | Age: 52
Setting detail: THERAPIES SERIES
Discharge: HOME | End: 2022-05-10
Payer: COMMERCIAL

## 2022-05-10 VITALS
TEMPERATURE: 97.1 F | RESPIRATION RATE: 20 BRPM | SYSTOLIC BLOOD PRESSURE: 144 MMHG | DIASTOLIC BLOOD PRESSURE: 78 MMHG | HEART RATE: 86 BPM
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
## 2022-05-10 DIAGNOSIS — T66.XXXS LATE EFFECT OF RADIATION: Primary | ICD-10-CM

## 2022-05-10 DIAGNOSIS — L59.8 RADIATION NECROSIS OF SKIN AND SUBCUTANEOUS: Chronic | ICD-10-CM

## 2022-05-10 PROCEDURE — 99183 HYPERBARIC OXYGEN THERAPY: CPT | Performed by: SURGERY

## 2022-05-10 PROCEDURE — G0277 HBOT, FULL BODY CHAMBER, 30M: HCPCS

## 2022-05-10 NOTE — PROGRESS NOTES
[facility]  Hyperbaric Oxygen Therapy   Progress Note    NAME: Priscilla HOYOS [facility]  MEDICAL RECORD NUMBER:  [id number]  AGE: 54 y.o. 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 See doc flowsheets for documentation. Objective       No results for input(s): GLUMET in the last 72 hours. Pre treatment Vital Signs       Temp: 97.3 °F (36.3 °C)     Pulse: 90     Resp: 18     BP: (!) 144/76     Post treatment Vital Signs  Temp: 97.1 °F (36.2 °C)  Pulse: 86  Resp: 20  BP: (!) 144/78  Assessment      Physical Exam:  General Appearance:  alert and oriented to person, place and time, well-developed and well-nourished, in no acute distress  ENT:  tympanic membranes intact bilaterally  Pulmonary/Chest:  clear to auscultation bilaterally- no wheezes, rales or rhonchi, normal air movement, no respiratory distress  Cardiovascular:  regular rate and rhythm  Chamber #: 38  Treatment Start Time: 1355     Pressure Reached Time: 1407  FADI : 2.5  Number of Air Breaks:  Treatment Status: Air break X 2 for 5 minutes each     Decompression Time: 1548   Treatment End Time: 1602  Length of Treatment: 90 Minutes  Symptoms Noted During Treatment: None  Total Treatment Time (min): 127    Adverse Event: no    I was present on these premises and immediately available to furnish assistance & direction throughout the procedure. Laura      Dahiana Santiago is a 54 y.o. female  did successfully complete today's hyperbaric oxygen treatment at [address] [place] [address].     In my clinical judgement, ongoing HBO therapy is  necessary at this time, given a threat to patient function, limb or life from the current condition. Supervision and attendance of Hyperbaric Oxygen Therapy provided. Continue HBO treatment as outlined in the treatment plan. Hyperbaric Oxygen: Tamika Bloodgood Kyra tolerated Treatment Number: 16 well today without complications. Discharge Instructions were explained and given to Ms. Rae     Electronically signed by Idalia Tucker MD on 5/10/2022 at 4:17 PM

## 2022-05-11 ENCOUNTER — HOSPITAL ENCOUNTER (OUTPATIENT)
Dept: HYPERBARIC MEDICINE | Age: 52
Setting detail: THERAPIES SERIES
Discharge: HOME | End: 2022-05-11
Payer: COMMERCIAL

## 2022-05-11 ENCOUNTER — HOSPITAL ENCOUNTER (OUTPATIENT)
Dept: WOUND CARE | Age: 52
Discharge: HOME | End: 2022-05-11
Payer: COMMERCIAL

## 2022-05-11 VITALS
SYSTOLIC BLOOD PRESSURE: 130 MMHG | TEMPERATURE: 96.4 F | HEART RATE: 80 BPM | DIASTOLIC BLOOD PRESSURE: 68 MMHG | RESPIRATION RATE: 18 BRPM

## 2022-05-11 VITALS
RESPIRATION RATE: 18 BRPM | SYSTOLIC BLOOD PRESSURE: 130 MMHG | WEIGHT: 145 LBS | HEIGHT: 58 IN | DIASTOLIC BLOOD PRESSURE: 82 MMHG | HEART RATE: 90 BPM | BODY MASS INDEX: 30.44 KG/M2 | TEMPERATURE: 97 F

## 2022-05-11 DIAGNOSIS — T66.XXXS LATE EFFECT OF RADIATION: Primary | ICD-10-CM

## 2022-05-11 DIAGNOSIS — T66.XXXS LATE EFFECT OF RADIATION: ICD-10-CM

## 2022-05-11 DIAGNOSIS — L98.499 ULCER OF SKIN OF BREAST (HCC): Primary | ICD-10-CM

## 2022-05-11 DIAGNOSIS — T81.30XA WOUND DEHISCENCE: ICD-10-CM

## 2022-05-11 PROCEDURE — 99213 OFFICE O/P EST LOW 20 MIN: CPT | Performed by: SURGERY

## 2022-05-11 PROCEDURE — 99212 OFFICE O/P EST SF 10 MIN: CPT

## 2022-05-11 PROCEDURE — 99183 HYPERBARIC OXYGEN THERAPY: CPT | Performed by: SURGERY

## 2022-05-11 PROCEDURE — G0277 HBOT, FULL BODY CHAMBER, 30M: HCPCS

## 2022-05-11 RX ORDER — LIDOCAINE 40 MG/G
CREAM TOPICAL ONCE
Status: CANCELLED | OUTPATIENT
Start: 2022-05-11 | End: 2022-05-11

## 2022-05-11 RX ORDER — LIDOCAINE HYDROCHLORIDE 40 MG/ML
SOLUTION TOPICAL ONCE
Status: CANCELLED | OUTPATIENT
Start: 2022-05-11 | End: 2022-05-11

## 2022-05-11 RX ORDER — BACITRACIN, NEOMYCIN, POLYMYXIN B 400; 3.5; 5 [USP'U]/G; MG/G; [USP'U]/G
OINTMENT TOPICAL ONCE
Status: CANCELLED | OUTPATIENT
Start: 2022-05-11 | End: 2022-05-11

## 2022-05-11 RX ORDER — GENTAMICIN SULFATE 1 MG/G
OINTMENT TOPICAL ONCE
Status: CANCELLED | OUTPATIENT
Start: 2022-05-11 | End: 2022-05-11

## 2022-05-11 RX ORDER — BETAMETHASONE DIPROPIONATE 0.05 %
OINTMENT (GRAM) TOPICAL ONCE
Status: CANCELLED | OUTPATIENT
Start: 2022-05-11 | End: 2022-05-11

## 2022-05-11 RX ORDER — CLOBETASOL PROPIONATE 0.5 MG/G
OINTMENT TOPICAL ONCE
Status: CANCELLED | OUTPATIENT
Start: 2022-05-11 | End: 2022-05-11

## 2022-05-11 RX ORDER — LIDOCAINE 50 MG/G
OINTMENT TOPICAL ONCE
Status: CANCELLED | OUTPATIENT
Start: 2022-05-11 | End: 2022-05-11

## 2022-05-11 RX ORDER — GINSENG 100 MG
CAPSULE ORAL ONCE
Status: CANCELLED | OUTPATIENT
Start: 2022-05-11 | End: 2022-05-11

## 2022-05-11 RX ORDER — LIDOCAINE HYDROCHLORIDE 20 MG/ML
JELLY TOPICAL ONCE
Status: CANCELLED | OUTPATIENT
Start: 2022-05-11 | End: 2022-05-11

## 2022-05-11 RX ORDER — BACITRACIN ZINC AND POLYMYXIN B SULFATE 500; 1000 [USP'U]/G; [USP'U]/G
OINTMENT TOPICAL ONCE
Status: CANCELLED | OUTPATIENT
Start: 2022-05-11 | End: 2022-05-11

## 2022-05-11 RX ORDER — LIDOCAINE HYDROCHLORIDE 40 MG/ML
SOLUTION TOPICAL ONCE
Status: DISCONTINUED | OUTPATIENT
Start: 2022-05-11 | End: 2022-05-12 | Stop reason: HOSPADM

## 2022-05-11 NOTE — PROGRESS NOTES
Hyperbarics Provider Reassessment     Jason Wise is a 54 y.o. female has been receiving hyperbaric oxygen treatment for management of    HBO Diagnosis:   Problem List Items Addressed This Visit     Late effect of radiation - Primary (Chronic)    Relevant Orders    Notify physician (specify)    Hyperbaric Oxygen Therapy          Kyra has completed Treatment Number: 17 out of a treatment protocol of Total Treatments: 20. Hyperbaric oxygen therapy physician orders,plan of care, vascular and nutritional status reviewed, addressed and updated as needed. Pt is tolerating hyperbaric oxygen therapy  well without complications. All wound related documentation/correspondences from the [address] [place] [address] and/or referring/collaborating physicians has been reviewed.      Wound:  Wound 03/30/22 Breast Right #1 (Active)   Wound Image   04/27/22 1313   Dressing Status New dressing applied 05/04/22 1407   Wound Cleansed Cleansed with saline 05/04/22 1407   Dressing/Treatment Alginate;Collagen with Ag;Dry dressing 05/04/22 1407   Offloading for Diabetic Foot Ulcers Offloading not required 05/04/22 1407   Wound Length (cm) 0.4 cm 05/11/22 1322   Wound Width (cm) 0.2 cm 05/11/22 1322   Wound Depth (cm) 0.1 cm 05/11/22 1322   Wound Surface Area (cm^2) 0.08 cm^2 05/11/22 1322   Change in Wound Size % (l*w) 87.69 05/11/22 1322   Wound Volume (cm^3) 0.008 cm^3 05/11/22 1322   Wound Healing % 96 05/11/22 1322   Post-Procedure Length (cm) 0.4 cm 05/11/22 1402   Post-Procedure Width (cm) 0.2 cm 05/11/22 1402   Post-Procedure Depth (cm) 0.1 cm 05/11/22 1402   Post-Procedure Surface Area (cm^2) 0.08 cm^2 05/11/22 1402   Post-Procedure Volume (cm^3) 0.008 cm^3 05/11/22 1402   Undermining Starts ___ O'Clock 3 03/30/22 1323   Undermining Ends___ O'Clock 9 03/30/22 1323   Undermining Maxium Distance (cm) 0.5 @ 6 03/30/22 1323   Wound Assessment Dry 05/11/22 1322   Drainage Amount Scant 05/11/22 1322   Drainage Description Yellow 05/11/22 1322   Odor None 05/11/22 1322   Ling-wound Assessment Intact 05/11/22 1322   Number of days: 42       [] Non-Wound Related Diagnosis for HBOT    The wounds are responding to hyperbaric oxygen therapy. Based on all available clinical documentation, we will  continue hyperbaric oxygen therapy.           Isidra Parker MD  5/11/2022  4:30 PM

## 2022-05-11 NOTE — PROGRESS NOTES
[facility]  Hyperbaric Oxygen Therapy   Progress Note    NAME: Lennox Caldron [facility]  MEDICAL RECORD NUMBER:  [id number]  AGE: 54 y.o. 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 See doc flowsheets for documentation. Objective       No results for input(s): GLUMET in the last 72 hours. Pre treatment Vital Signs       Temp: 97.3 °F (36.3 °C)     Pulse: 72     Resp: 18     BP: 132/64     Post treatment Vital Signs  Temp: 97.6 °F (36.4 °C)  Pulse: 76  Resp: 18  BP: 134/70  Assessment      Physical Exam:  General Appearance:  alert and oriented to person, place and time, well-developed and well-nourished, in no acute distress  ENT:  tympanic membranes intact bilaterally  Pulmonary/Chest:  clear to auscultation bilaterally- no wheezes, rales or rhonchi, normal air movement, no respiratory distress  Cardiovascular:  regular rate and rhythm  Chamber #: 38  Treatment Start Time: 1404     Pressure Reached Time: 1417  FADI : 2.5  Number of Air Breaks:  Treatment Status: Air break X 2 for 5 minutes each     Decompression Time: 1559   Treatment End Time: 1610  Length of Treatment: 90 Minutes  Symptoms Noted During Treatment: None  Total Treatment Time (min): 126    Adverse Event: no    I was present on these premises and immediately available to furnish assistance & direction throughout the procedure. Laura Lima is a 54 y.o. female  did successfully complete today's hyperbaric oxygen treatment at [place] [address]. In my clinical judgement, ongoing HBO therapy is  necessary at this time, given a threat to patient function, limb or life from the current condition. Supervision and attendance of Hyperbaric Oxygen Therapy provided. Continue HBO treatment as outlined in the treatment plan. Hyperbaric Oxygen: Cristel Rae tolerated Treatment Number: 7 well today without complications. Discharge Instructions were explained and given to Ms. Rae     Electronically signed by Ez Freitas MD on 4/27/2022 at 4:32 PM

## 2022-05-11 NOTE — PROGRESS NOTES
[facility]  Hyperbaric Oxygen Therapy   Progress Note    NAME: Israel Ro [facility]  MEDICAL RECORD NUMBER:  [id number]  AGE: 54 y.o. GENDER: female  : 1970  EPISODE DATE:  2022   Subjective   HBO Treatment Number: 12 out of Total Treatments: 20  HBO Diagnosis:   Problem List Items Addressed This Visit     Late effect of radiation - Primary (Chronic)        Safety checks performed prior to treatment. See doc flowsheets for documentation. Objective       No results for input(s): GLUMET in the last 72 hours. Pre treatment Vital Signs       Temp: 97.6 °F (36.4 °C)     Pulse: 76     Resp: 16     BP: 118/64     Post treatment Vital Signs  Temp: 97 °F (36.1 °C)  Pulse: 80  Resp: 16  BP: 134/70  Assessment      Physical Exam:  General Appearance:  alert and oriented to person, place and time, well-developed and well-nourished, in no acute distress  ENT:  tympanic membranes intact bilaterally  Pulmonary/Chest:  clear to auscultation bilaterally- no wheezes, rales or rhonchi, normal air movement, no respiratory distress  Cardiovascular:  regular rate and rhythm  Chamber #: 38  Treatment Start Time: 1421     Pressure Reached Time: 1433  FADI : 2.5  Number of Air Breaks:  Treatment Status: Air break X 2 for 5 minutes each     Decompression Time: 1616   Treatment End Time: 1626  Length of Treatment: 90 Minutes  Symptoms Noted During Treatment: None  Total Treatment Time (min): 125    Adverse Event: no    I was present on these premises and immediately available to furnish assistance & direction throughout the procedure. Plan      Hunter Landaverde is a 54 y.o. female  did successfully complete today's hyperbaric oxygen treatment at [address] [place] [address]. In my clinical judgement, ongoing HBO therapy is  necessary at this time, given a threat to patient function, limb or life from the current condition.       Supervision and attendance of Hyperbaric Oxygen Therapy provided. Continue HBO treatment as outlined in the treatment plan. Hyperbaric Oxygen: Jossue Rae tolerated Treatment Number: 12 well today without complications. Discharge Instructions were explained and given to Ms. Rae     Electronically signed by Benjamin Archer MD on 5/4/2022 at 4:33 PM

## 2022-05-11 NOTE — PROGRESS NOTES
[facility]  Hyperbaric Oxygen Therapy   Progress Note    NAME: Viviana HOYOS [facility]  MEDICAL RECORD NUMBER:  [id number]  AGE: 54 y.o. 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 See doc flowsheets for documentation. Objective       No results for input(s): GLUMET in the last 72 hours. Pre treatment Vital Signs       Temp: 97.3 °F (36.3 °C)     Pulse: 92     Resp: 20     BP: 118/84     Post treatment Vital Signs  Temp: 96.4 °F (35.8 °C)  Pulse: 80  Resp: 18  BP: 130/68  Assessment      Physical Exam:  General Appearance:  alert and oriented to person, place and time, well-developed and well-nourished, in no acute distress  ENT:  tympanic membranes intact bilaterally  Pulmonary/Chest:  clear to auscultation bilaterally- no wheezes, rales or rhonchi, normal air movement, no respiratory distress  Cardiovascular:  regular rate and rhythm  Chamber #: 38  Treatment Start Time: 1417     Pressure Reached Time: 1432  FDAI : 2.5  Number of Air Breaks:  Treatment Status: Air break X 2 for 5 minutes each     Decompression Time: 1610   Treatment End Time: 1622  Length of Treatment: 90 Minutes  Symptoms Noted During Treatment: None  Total Treatment Time (min): 125    Adverse Event: no    I was present on these premises and immediately available to furnish assistance & direction throughout the procedure. Laura Rich is a 54 y.o. female  did successfully complete today's hyperbaric oxygen treatment at [address] [place] [address].     In my clinical judgement, ongoing HBO therapy is  necessary at this time, given a threat to patient function, limb or life from the current condition. Supervision and attendance of Hyperbaric Oxygen Therapy provided. Continue HBO treatment as outlined in the treatment plan. Hyperbaric Oxygen: Chinyere Rae tolerated Treatment Number: 20 well today without complications. Discharge Instructions were explained and given to Ms. Rae     Electronically signed by Dian Alves MD on 5/11/2022 at 4:33 PM

## 2022-05-12 ENCOUNTER — HOSPITAL ENCOUNTER (OUTPATIENT)
Dept: HYPERBARIC MEDICINE | Age: 52
Setting detail: THERAPIES SERIES
Discharge: HOME | End: 2022-05-12
Payer: COMMERCIAL

## 2022-05-12 VITALS
DIASTOLIC BLOOD PRESSURE: 82 MMHG | TEMPERATURE: 96.1 F | SYSTOLIC BLOOD PRESSURE: 142 MMHG | RESPIRATION RATE: 16 BRPM | HEART RATE: 76 BPM

## 2022-05-12 DIAGNOSIS — T66.XXXS LATE EFFECT OF RADIATION: Primary | ICD-10-CM

## 2022-05-12 DIAGNOSIS — L59.8 RADIATION NECROSIS OF SKIN AND SUBCUTANEOUS: Chronic | ICD-10-CM
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
## 2022-05-12 PROCEDURE — G0277 HBOT, FULL BODY CHAMBER, 30M: HCPCS

## 2022-05-12 PROCEDURE — 99183 HYPERBARIC OXYGEN THERAPY: CPT | Performed by: SURGERY

## 2022-05-12 NOTE — PROGRESS NOTES
Abril Mosley [address]  Hyperbaric Oxygen Therapy   Progress Note    NAME: Sugey HOYOS [facility]  MEDICAL RECORD NUMBER:  [id number]  AGE: 54 y.o. 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 See doc flowsheets for documentation. Objective       No results for input(s): GLUMET in the last 72 hours. Pre treatment Vital Signs       Temp: 96.6 °F (35.9 °C)     Pulse: 82     Resp: 16     BP: 132/75     Post treatment Vital Signs  Temp: 96.1 °F (35.6 °C)  Pulse: 76  Resp: 16  BP: (!) 142/82  Assessment      Physical Exam:  General Appearance:  alert and oriented to person, place and time, well-developed and well-nourished, in no acute distress  ENT:  tympanic membranes intact bilaterally  Pulmonary/Chest:  clear to auscultation bilaterally- no wheezes, rales or rhonchi, normal air movement, no respiratory distress  Cardiovascular:  regular rate and rhythm  Chamber #: 38  Treatment Start Time: 1320     Pressure Reached Time: 1337  FADI : 2.5  Number of Air Breaks:  Treatment Status: Air break X 2 for 5 minutes each     Decompression Time: 1520   Treatment End Time: 1537  Length of Treatment: 90 Minutes  Symptoms Noted During Treatment: None  Total Treatment Time (min): 137    Adverse Event: no    I was present on these premises and immediately available to furnish assistance & direction throughout the procedure. Laura      Patricia Richards is a 54 y.o. female  did successfully complete today's hyperbaric oxygen treatment at [address] [place] [address].     In my clinical judgement, ongoing HBO therapy is  necessary at this time, given a threat to patient function, limb or life from the current condition. Supervision and attendance of Hyperbaric Oxygen Therapy provided. Continue HBO treatment as outlined in the treatment plan. Hyperbaric Oxygen: Raquel Rae tolerated Treatment Number: 94 well today without complications. Discharge Instructions were explained and given to Ms. Rae     Electronically signed by Alondra Miles MD on 5/12/2022 at 4:34 PM

## 2022-05-12 NOTE — PROGRESS NOTES
Wound Healing Center Followup Visit Note    Referring Physician : MD Kapil Simmonsbrenda [facility]  MEDICAL RECORD NUMBER:  [id number]  AGE: 54 y.o. GENDER: female  : 1970  EPISODE DATE:  2022    Subjective:     Chief Complaint   Patient presents with    Wound Check     right chest/breast      HISTORY of PRESENT ILLNESS HPI   Betty Orr is a 54 y.o. female who presents today in regards to follow up evaluation and treatment of wound/ulcer. That patient's past medical, family and social hx were reviewed and changes were made if present. History of Wound Context:  Patient has had a wound of her right chest since 2022. She had her mastectomy wound dehisce. She had her first breast cancer at age 30.  She had undergone a lumpectomy with reexcision for margins and radiation at that time first at [facility] and then up at [facility]. Brendan Ball was estrogen progesterone positive and HER-2 negative and was placed on tamoxifen then for 5 years. She had a recurrence in . It was estrogen progesterone negative and HER-2 positive.  She underwent an MRI of her breast and the tumor was only 1.6 cm in size.  Due to the recurrence she also underwent CAT scans and bone scans which were negative.  Due to the tumor being less than 2 cm in size we took her to the OR and she elected for bilateral mastectomy.  [place] lymph node was attempted but it turned into a right modified radical mastectomy. Mikala Means final pathology showed that she had multiple lymph nodes that were positive.  So she went into surgery and a stage Ia tumor and came out of surgery as a stage IIIa. She underwent PET scan in October which showed postoperative change.      At her second postoperative appointment she had a lot of drainage through the wound despite the FABIO drain that had been left.  And she dehisced in the middle to lateral aspect of the wound.  She was treated with wet-to-dry dressings.  When she got off her chemotherapy and onto immunotherapy she went back to surgery in 2/2022 for debridement and closure.  The wound was able to be closed but it had a little bit of tension medially.  She developed a seroma.      She has had a wound vac since ~ 2/2022. All of her sutures have been removed. On their initial visit to the wound healing center, 3/30/22  the patient has noted that the wound has been stable. The patient has not had similar previous wounds in the past.      Pt is not on abx at time of initial visit.     3/30/22  · Culture done  · aquacell packing  · Stop wound vac  · Screen for hbo  · Labs and cxr ordered   4/6/22  · Wound improved  4/13/22  · Improved  4/27/22  · Improved - at 7 oclcok - small area - pack  · Started hbo  · Some drainage from higher up towards axilla  5/4/22  · Wound improved  · Continue hbo  5/11/22  · Medial healed  · Lateral some drainage - small opening  · Continue hbo     Wound/Ulcer Pain Timing/Severity: none  Quality of pain: N/A  Severity:  0 / 10   Modifying Factors: None  Associated Signs/Symptoms: drainage    Ulcer Identification:  Ulcer Type: non-healing surgical, soft tissue radionecrosis, compromised skin graft/flap and necrosis radiation  Contributing Factors: edema and decreased tissue oxygenation    Diabetic/Pressure/Non Pressure Ulcers only:  Ulcer: Non-Pressure ulcer, fat layer exposed  Wound: Wound dehiscence        PAST MEDICAL HISTORY      Diagnosis Date    Cancer [facility]) 2008    right breast    Late effect of radiation 3/30/2022    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 [address] LUMPECTOMY Right     CARPAL TUNNEL RELEASE Right     CHEST WALL RESECTION Right 2/8/2022    RIGHT CHEST WALL COMPLEX WOUND CLOSURE AND WOUND VAC performed by Raya Palmer MD at [facility] PARTIAL HYSTERECTOMY      PORT SURGERY Left 7/6/2021    LEFT SUBCLAVIAN MEDI PORT INSERTION performed by Raya Palmer MD at [address] [place] [address]       Family History   Problem Relation Age of Onset    Cancer Mother         lung     Social History     Tobacco Use    Smoking status: Former Smoker     Packs/day: 0.50    Smokeless tobacco: Former User     Quit date: 3/9/2022    Tobacco comment: 1/2 pack or less    Vaping Use    Vaping Use: Never used   Substance Use Topics    Alcohol use: Yes     Comment: occasionaly    Drug use: Never     Allergies   Allergen Reactions    Meperidine     Morphine     Sulfa Antibiotics Nausea And Vomiting and Other (See Comments)     fever     Current Outpatient Medications on File Prior to Encounter   Medication Sig Dispense Refill    Multiple Vitamins-Minerals (THERAPEUTIC MULTIVITAMIN-MINERALS) tablet Take 1 tablet by mouth daily       BIOTIN PO Take by mouth       NONFORMULARY Inject into the muscle every 21 days Chemo cocktail:  Pertuzumab, Trastuzumab, Hyaluronidase  Goes to [facility] for the injection        No current facility-administered medications on file prior to encounter.        REVIEW OF SYSTEMS See HPI    Objective:    /82   Pulse 90   Temp 97 °F (36.1 °C) (Temporal)   Resp 18   Ht 4' 10\" (1.473 m)   Wt 145 lb (65.8 kg)   BMI 30.31 kg/m²   Wt Readings from Last 3 Encounters:   05/11/22 145 lb (65.8 kg)   05/04/22 145 lb (65.8 kg)   04/27/22 145 lb (65.8 kg)     PHYSICAL EXAM  CONSTITUTIONAL:   Awake, alert, cooperative   EYES:  lids and lashes normal   ENT: external ears and nose without lesions   NECK:  supple, symmetrical, trachea midline   SKIN:  Open wound present    Assessment:     Problem List Items Addressed This Visit     Ulcer of skin of breast ([facility] [address] - Primary (Chronic)    Relevant Medications    lidocaine (XYLOCAINE) 4 % external solution    Other Relevant Orders    Initiate Outpatient Wound Care Protocol    Late effect of radiation (Chronic)    Relevant Medications    lidocaine (XYLOCAINE) 4 % external solution    Other Relevant Orders    Initiate Outpatient Wound Care Protocol    Wound dehiscence    Relevant Medications    lidocaine (XYLOCAINE) 4 % external solution    Other Relevant Orders    Initiate Outpatient Wound Care Protocol          Pre Debridement Measurements:  Are located in the [facility]  Documentation Flow Sheet  Post Debridement Measurements:  Wound/Ulcer Descriptions are Pre Debridement except measurements:     Wound 03/30/22 Breast Right #1 (Active)   Wound Image   04/27/22 1313   Dressing Status New dressing applied 05/04/22 1407   Wound Cleansed Cleansed with saline 05/04/22 1407   Dressing/Treatment Alginate;Collagen with Ag;Dry dressing 05/04/22 1407   Offloading for Diabetic Foot Ulcers Offloading not required 05/04/22 1407   Wound Length (cm) 0.4 cm 05/11/22 1322   Wound Width (cm) 0.2 cm 05/11/22 1322   Wound Depth (cm) 0.1 cm 05/11/22 1322   Wound Surface Area (cm^2) 0.08 cm^2 05/11/22 1322   Change in Wound Size % (l*w) 87.69 05/11/22 1322   Wound Volume (cm^3) 0.008 cm^3 05/11/22 1322   Wound Healing % 96 05/11/22 1322   Post-Procedure Length (cm) 0.4 cm 05/11/22 1402   Post-Procedure Width (cm) 0.2 cm 05/11/22 1402   Post-Procedure Depth (cm) 0.1 cm 05/11/22 1402   Post-Procedure Surface Area (cm^2) 0.08 cm^2 05/11/22 1402   Post-Procedure Volume (cm^3) 0.008 cm^3 05/11/22 1402   Undermining Starts ___ O'Clock 3 04/06/22 1402   Undermining Ends___ O'Clock 8 04/06/22 1402   Undermining Maxium Distance (cm) 0.5 @ 7 04/06/22 1402   Wound Assessment Dry 05/11/22 1322   Drainage Amount Scant 05/11/22 1322   Drainage Description Yellow 05/11/22 1322   Odor None 05/11/22 1322   Ling-wound Assessment Intact 05/11/22 1322   Number of days: 42     Incision 07/06/21 Breast Right;Medial (Active)   Number of days: 309       Incision 02/08/22 Chest Right (Active)   Number of days: 92     No debridement    Plan:   Treatment Note please see attached Discharge Instructions    Written patient dismissal instructions given to patient and signed by patient or POA. Discharge Instructions       Visit Discharge/Physician Orders     Discharge condition: Stable     Assessment of pain at discharge: mild     Anesthetic used: lido 4%     Discharge to: Home     Left via:Private automobile     Accompanied by: accompanied by self     ECF/HHA: [facility]      Dressing Orders: To right lateral breast wound: Cleanse with normal saline, pack loosely with aquacel rope in upper right corner, cover and secure with dry dressing. Change daily. Remove aquacel dry, do not wet to remove.      Treatment Orders: Eat a diet high in protein and vitamin C. Take a multiple vitamin daily unless contraindicated.     Do not get wet.       hyperbaric oxygen treatment     [facility] followup visit  1 week_________________________  (Please note your next appointment above and if you are unable to keep, kindly give a 24 hour notice.  Thank you.)     Physician signature:__________________________        If you experience any of the following, please call the [facility] during business hours:     * Increase in Pain  * Temperature over 101  * Increase in drainage from your wound  * Drainage with a foul odor  * Bleeding  * Increase in swelling  * Need for compression bandage changes due to slippage, breakthrough drainage.     If you need medical attention outside of the business hours of the [facility] please contact your PCP or go to the nearest emergency room.                Electronically signed by Tayler Conroy MD

## 2022-05-13 ENCOUNTER — HOSPITAL ENCOUNTER (OUTPATIENT)
Dept: HYPERBARIC MEDICINE | Age: 52
Setting detail: THERAPIES SERIES
Discharge: HOME | End: 2022-05-13
Payer: COMMERCIAL

## 2022-05-13 VITALS
HEART RATE: 80 BPM | DIASTOLIC BLOOD PRESSURE: 72 MMHG | SYSTOLIC BLOOD PRESSURE: 132 MMHG | TEMPERATURE: 97.4 F | RESPIRATION RATE: 16 BRPM

## 2022-05-13 DIAGNOSIS — T66.XXXS LATE EFFECT OF RADIATION: Primary | ICD-10-CM

## 2022-05-13 PROCEDURE — G0277 HBOT, FULL BODY CHAMBER, 30M: HCPCS

## 2022-05-13 PROCEDURE — 99183 HYPERBARIC OXYGEN THERAPY: CPT | Performed by: SURGERY

## 2022-05-16 ENCOUNTER — HOSPITAL ENCOUNTER (OUTPATIENT)
Dept: HYPERBARIC MEDICINE | Age: 52
Setting detail: THERAPIES SERIES
Discharge: HOME | End: 2022-05-16
Payer: COMMERCIAL

## 2022-05-16 VITALS
SYSTOLIC BLOOD PRESSURE: 128 MMHG | HEART RATE: 72 BPM | RESPIRATION RATE: 18 BRPM | TEMPERATURE: 97.2 F | DIASTOLIC BLOOD PRESSURE: 68 MMHG

## 2022-05-16 DIAGNOSIS — T66.XXXS LATE EFFECT OF RADIATION: Primary | ICD-10-CM

## 2022-05-16 PROCEDURE — 99183 HYPERBARIC OXYGEN THERAPY: CPT | Performed by: SURGERY

## 2022-05-16 PROCEDURE — G0277 HBOT, FULL BODY CHAMBER, 30M: HCPCS

## 2022-05-17 ENCOUNTER — HOSPITAL ENCOUNTER (OUTPATIENT)
Dept: HYPERBARIC MEDICINE | Age: 52
Setting detail: THERAPIES SERIES
Discharge: HOME | End: 2022-05-17
Payer: COMMERCIAL

## 2022-05-17 VITALS
HEART RATE: 71 BPM | SYSTOLIC BLOOD PRESSURE: 132 MMHG | TEMPERATURE: 97.9 F | RESPIRATION RATE: 20 BRPM | DIASTOLIC BLOOD PRESSURE: 68 MMHG
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
## 2022-05-17 DIAGNOSIS — T66.XXXS LATE EFFECT OF RADIATION: Primary | ICD-10-CM

## 2022-05-17 DIAGNOSIS — L59.8 RADIATION NECROSIS OF SKIN AND SUBCUTANEOUS: Chronic | ICD-10-CM

## 2022-05-17 PROCEDURE — 99183 HYPERBARIC OXYGEN THERAPY: CPT | Performed by: SURGERY

## 2022-05-17 PROCEDURE — G0277 HBOT, FULL BODY CHAMBER, 30M: HCPCS

## 2022-05-17 NOTE — PROGRESS NOTES
Abril Mosley [address]  Hyperbaric Oxygen Therapy   Progress Note    NAME: Sylvia HOYOS [facility]  MEDICAL RECORD NUMBER:  [id number]  AGE: 54 y.o. 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 See doc flowsheets for documentation. Objective       No results for input(s): GLUMET in the last 72 hours. Pre treatment Vital Signs       Temp: 97.4 °F (36.3 °C)     Pulse: 71     Resp: 19     BP: 138/86     Post treatment Vital Signs  Temp: 97.9 °F (36.6 °C)  Pulse: 71  Resp: 20  BP: 132/68  Assessment      Physical Exam:  General Appearance:  alert and oriented to person, place and time, well-developed and well-nourished, in no acute distress  ENT:  tympanic membranes intact bilaterally  Pulmonary/Chest:  clear to auscultation bilaterally- no wheezes, rales or rhonchi, normal air movement, no respiratory distress  Cardiovascular:  regular rate and rhythm  Chamber #: 38  Treatment Start Time: 1202     Pressure Reached Time: 1217  FADI : 2.5  Number of Air Breaks:  Treatment Status: Air break X 2 for 5 minutes each     Decompression Time: 1400   Treatment End Time: 1413  Length of Treatment: 90 Minutes  Symptoms Noted During Treatment: None  Total Treatment Time (min): 131    Adverse Event: No   I was present on these premises and immediately available to furnish assistance & direction throughout the procedure. Plan      Burak Marc is a 54 y.o. female  did successfully complete today's hyperbaric oxygen treatment at [address] [place] [address].     In my clinical judgement, ongoing HBO therapy is  necessary at this time, given a threat to patient function, limb or life from the current condition. Supervision and attendance of Hyperbaric Oxygen Therapy provided. Continue HBO treatment as outlined in the treatment plan. Hyperbaric Oxygen: Viviana Rae tolerated Treatment Number: 21 well today without complications. Discharge Instructions were explained and given to Ms. Rae     Electronically signed by Zahida Han MD on 5/17/2022 at 2:33 PM

## 2022-05-17 NOTE — PROGRESS NOTES
Abril Mosley [address]  Hyperbaric Oxygen Therapy   Progress Note    NAME: Sheryle Sera COOK [facility]  MEDICAL RECORD NUMBER:  [id number]  AGE: 54 y.o. GENDER: female  : 1970  EPISODE DATE:  2022   Subjective   HBO Treatment Number: 10 out of Total Treatments: 20  HBO Diagnosis:   Problem List Items Addressed This Visit     Late effect of radiation - Primary (Chronic)        Safety checks performed prior to treatment by HBOT staff. See doc flowsheets for documentation. Objective       No results for input(s): GLUMET in the last 72 hours. Pre treatment Vital Signs       Temp: 97 °F (36.1 °C)     Pulse: 72     Resp: 18     BP: 120/60     Post treatment Vital Signs  Temp: 97.1 °F (36.2 °C)  Pulse: 77  Resp: 18  BP: 118/74  Assessment      Physical Exam:  General Appearance:  alert and oriented to person, place and time, well-developed and well-nourished, in no acute distress  ENT:  tympanic membranes intact bilaterally, normal color, normal light reflex bilaterally  Pulmonary/Chest:  clear to auscultation bilaterally- no wheezes, rales or rhonchi, normal air movement, no respiratory distress  Cardiovascular:  regular rate and rhythm  Chamber #: 38  Treatment Start Time: 1414     Pressure Reached Time: 1429  FADI : 2.5  Number of Air Breaks: 2  Treatment Status: Air break X 2 for 5 minutes each     Decompression Time: 1611   Treatment End Time: 1624  Length of Treatment: 90 Minutes  Symptoms Noted During Treatment: None  Total Treatment Time (min): 130    Adverse Event: no    I was present on these premises and immediately available to furnish assistance & direction throughout the procedure. Laura      Claribel Doyle is a 54 y.o. female  did successfully complete today's hyperbaric oxygen treatment at [address] [place] [address].     In my clinical judgement, ongoing HBO therapy is necessary at this time, given a threat to patient function, limb or life from the current condition. Supervision and attendance of Hyperbaric Oxygen Therapy provided. Continue HBO treatment as outlined in the treatment plan. Hyperbaric Oxygen: Darbonyd Davian Rae tolerated Treatment Number: 10 well today without complications. Discharge Instructions were explained and given to Ms. Rae by HBOT staff    Electronically signed by Philipp Myers MD

## 2022-05-17 NOTE — PROGRESS NOTES
Abril Mosley 476  Hyperbaric Oxygen Therapy   Progress Note    NAME: Priscilla HOYOS [facility]  MEDICAL RECORD NUMBER:  [id number]  AGE: 54 y.o. GENDER: female  : 1970  EPISODE DATE:  2022   Subjective   HBO Treatment Number: 15 out of Total Treatments: 20  HBO Diagnosis:   Problem List Items Addressed This Visit     Late effect of radiation - Primary (Chronic)        Safety checks performed prior to treatment by HBOT staff. See doc flowsheets for documentation. Objective       No results for input(s): GLUMET in the last 72 hours. Pre treatment Vital Signs       Temp: 97.2 °F (36.2 °C)     Pulse: 91     Resp: 17     BP: (!) 152/84     Post treatment Vital Signs  Temp: 97.6 °F (36.4 °C)  Pulse: 84  Resp: 16  BP: (!) 140/80  Assessment      Physical Exam:  General Appearance:  alert and oriented to person, place and time, well-developed and well-nourished, in no acute distress  ENT:  tympanic membranes intact bilaterally, normal color, normal light reflex bilaterally  Pulmonary/Chest:  clear to auscultation bilaterally- no wheezes, rales or rhonchi, normal air movement, no respiratory distress  Cardiovascular:  regular rate and rhythm  Chamber #: 38  Treatment Start Time: 1415     Pressure Reached Time: 1430  FADI : 2.5  Number of Air Breaks: 2  Treatment Status: Back to oxygen     Decompression Time: 1616   Treatment End Time: 4986  Length of Treatment: 90 Minutes  Symptoms Noted During Treatment: None  Total Treatment Time (min): 134    Adverse Event: no    I was present on these premises and immediately available to furnish assistance & direction throughout the procedure. Laura      Dahiana Santiago is a 54 y.o. female  did successfully complete today's hyperbaric oxygen treatment at [address] [place] [address]. In my clinical judgement, ongoing HBO therapy is necessary at this time, given a threat to patient function, limb or life from the current condition. Supervision and attendance of Hyperbaric Oxygen Therapy provided. Continue HBO treatment as outlined in the treatment plan. Hyperbaric Oxygen: Tamika Bloodcassandradeirdre Rae tolerated Treatment Number: 15 well today without complications. Discharge Instructions were explained and given to Ms. Rae by HBOT staff    Electronically signed by Naa Mccullough MD

## 2022-05-18 ENCOUNTER — APPOINTMENT (OUTPATIENT)
Dept: HYPERBARIC MEDICINE | Age: 52
End: 2022-05-18
Payer: COMMERCIAL

## 2022-05-18 ENCOUNTER — HOSPITAL ENCOUNTER (OUTPATIENT)
Dept: WOUND CARE | Age: 52
Discharge: HOME | End: 2022-05-18
Payer: COMMERCIAL

## 2022-05-18 VITALS
BODY MASS INDEX: 30.44 KG/M2 | WEIGHT: 145 LBS | HEIGHT: 58 IN | DIASTOLIC BLOOD PRESSURE: 64 MMHG | TEMPERATURE: 96.7 F | HEART RATE: 84 BPM | SYSTOLIC BLOOD PRESSURE: 126 MMHG | RESPIRATION RATE: 16 BRPM

## 2022-05-18 DIAGNOSIS — T66.XXXS LATE EFFECT OF RADIATION: ICD-10-CM

## 2022-05-18 DIAGNOSIS — T81.30XA WOUND DEHISCENCE: ICD-10-CM

## 2022-05-18 DIAGNOSIS — L98.499 ULCER OF SKIN OF BREAST (HCC): Primary | ICD-10-CM
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
## 2022-05-18 DIAGNOSIS — L59.8 RADIATION NECROSIS OF SKIN AND SUBCUTANEOUS: Chronic | ICD-10-CM

## 2022-05-18 PROCEDURE — 97597 DBRDMT OPN WND 1ST 20 CM/<: CPT

## 2022-05-18 PROCEDURE — 6370000000 HC RX 637 (ALT 250 FOR IP): Performed by: SURGERY

## 2022-05-18 PROCEDURE — 97597 DBRDMT OPN WND 1ST 20 CM/<: CPT | Performed by: SURGERY

## 2022-05-18 RX ORDER — BACITRACIN ZINC AND POLYMYXIN B SULFATE 500; 1000 [USP'U]/G; [USP'U]/G
OINTMENT TOPICAL ONCE
Status: CANCELLED | OUTPATIENT
Start: 2022-05-18 | End: 2022-05-18

## 2022-05-18 RX ORDER — BACITRACIN, NEOMYCIN, POLYMYXIN B 400; 3.5; 5 [USP'U]/G; MG/G; [USP'U]/G
OINTMENT TOPICAL ONCE
Status: CANCELLED | OUTPATIENT
Start: 2022-05-18 | End: 2022-05-18

## 2022-05-18 RX ORDER — LIDOCAINE HYDROCHLORIDE 40 MG/ML
SOLUTION TOPICAL ONCE
Status: CANCELLED | OUTPATIENT
Start: 2022-05-18 | End: 2022-05-18

## 2022-05-18 RX ORDER — LIDOCAINE 40 MG/G
CREAM TOPICAL ONCE
Status: CANCELLED | OUTPATIENT
Start: 2022-05-18 | End: 2022-05-18

## 2022-05-18 RX ORDER — GENTAMICIN SULFATE 1 MG/G
OINTMENT TOPICAL ONCE
Status: CANCELLED | OUTPATIENT
Start: 2022-05-18 | End: 2022-05-18

## 2022-05-18 RX ORDER — CLOBETASOL PROPIONATE 0.5 MG/G
OINTMENT TOPICAL ONCE
Status: CANCELLED | OUTPATIENT
Start: 2022-05-18 | End: 2022-05-18

## 2022-05-18 RX ORDER — LIDOCAINE HYDROCHLORIDE 20 MG/ML
JELLY TOPICAL ONCE
Status: CANCELLED | OUTPATIENT
Start: 2022-05-18 | End: 2022-05-18

## 2022-05-18 RX ORDER — GINSENG 100 MG
CAPSULE ORAL ONCE
Status: CANCELLED | OUTPATIENT
Start: 2022-05-18 | End: 2022-05-18

## 2022-05-18 RX ORDER — LIDOCAINE 50 MG/G
OINTMENT TOPICAL ONCE
Status: CANCELLED | OUTPATIENT
Start: 2022-05-18 | End: 2022-05-18

## 2022-05-18 RX ORDER — BETAMETHASONE DIPROPIONATE 0.05 %
OINTMENT (GRAM) TOPICAL ONCE
Status: CANCELLED | OUTPATIENT
Start: 2022-05-18 | End: 2022-05-18

## 2022-05-18 RX ORDER — LIDOCAINE HYDROCHLORIDE 40 MG/ML
SOLUTION TOPICAL ONCE
Status: COMPLETED | OUTPATIENT
Start: 2022-05-18 | End: 2022-05-18

## 2022-05-18 RX ADMIN — LIDOCAINE HYDROCHLORIDE 3 ML: 40 SOLUTION TOPICAL at 13:34

## 2022-05-18 NOTE — PROGRESS NOTES
Wound Healing Center Followup Visit Note    Referring Physician : MD Jonathan Ledbetter [facility]  MEDICAL RECORD NUMBER:  [id number]  AGE: 54 y.o. GENDER: female  : 1970  EPISODE DATE:  2022    Subjective:     Chief Complaint   Patient presents with    Wound Check     right breast      HISTORY of PRESENT ILLNESS HPI   Narendra Cummins is a 54 y.o. female who presents today in regards to follow up evaluation and treatment of wound/ulcer. That patient's past medical, family and social hx were reviewed and changes were made if present. History of Wound Context:  Patient has had a wound of her right chest since 2022. She had her mastectomy wound dehisce. She had her first breast cancer at age 30.  She had undergone a lumpectomy with reexcision for margins and radiation at that time first at [facility] and then up at [facility]. Yves Shelton was estrogen progesterone positive and HER-2 negative and was placed on tamoxifen then for 5 years. She had a recurrence in . It was estrogen progesterone negative and HER-2 positive.  She underwent an MRI of her breast and the tumor was only 1.6 cm in size.  Due to the recurrence she also underwent CAT scans and bone scans which were negative.  Due to the tumor being less than 2 cm in size we took her to the OR and she elected for bilateral mastectomy.  [place] lymph node was attempted but it turned into a right modified radical mastectomy. Mariah Zarina final pathology showed that she had multiple lymph nodes that were positive.  So she went into surgery and a stage Ia tumor and came out of surgery as a stage IIIa. She underwent PET scan in October which showed postoperative change.      At her second postoperative appointment she had a lot of drainage through the wound despite the FABIO drain that had been left.  And she dehisced in the middle to lateral aspect of the wound.  She was treated with wet-to-dry dressings.  When she got off her chemotherapy and onto immunotherapy she went back to surgery in 2/2022 for debridement and closure.  The wound was able to be closed but it had a little bit of tension medially.  She developed a seroma.      She has had a wound vac since ~ 2/2022. All of her sutures have been removed. On their initial visit to the wound healing center, 3/30/22  the patient has noted that the wound has been stable. The patient has not had similar previous wounds in the past.      Pt is not on abx at time of initial visit.     3/30/22  · Culture done  · aquacell packing  · Stop wound vac  · Screen for hbo  · Labs and cxr ordered   4/6/22  · Wound improved  4/13/22  · Improved  4/27/22  · Improved - at 7 oclcok - small area - pack  · Started hbo  · Some drainage from higher up towards axilla  5/4/22  · Wound improved  · Continue hbo  5/11/22  · Medial healed  · Lateral some drainage - small opening  · Continue hbo  5/18/22  · Lateral - small opening made slightly larger so packing could be placed  · Continue hbo     Wound/Ulcer Pain Timing/Severity: none  Quality of pain: N/A  Severity:  0 / 10   Modifying Factors: None  Associated Signs/Symptoms: drainage    Ulcer Identification:  Ulcer Type: non-healing surgical, soft tissue radionecrosis, compromised skin graft/flap and necrosis radiation  Contributing Factors: edema and decreased tissue oxygenation    Diabetic/Pressure/Non Pressure Ulcers only:  Ulcer: Non-Pressure ulcer, fat layer exposed  Wound: Wound dehiscence        PAST MEDICAL HISTORY      Diagnosis Date    Cancer [facility]) 2008    right breast    Late effect of radiation 3/30/2022    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    Ulcer of skin of breast ([address] 3/30/2022     Past Surgical History:   Procedure Laterality Date    BREAST BIOPSY Bilateral 7/6/2021    RIGHT MODIFIED RADICAL MASTECTOMY,  LEFT BREAST PROPHYLATIC MASTECTOMY performed by Lesli Zarate MD at [address] [facility] Right     [facility] TUNNEL RELEASE Right     CHEST WALL RESECTION Right 2/8/2022    RIGHT CHEST WALL COMPLEX WOUND CLOSURE AND WOUND VAC performed by Evin Taylor MD at [facility] 22 PARTIAL HYSTERECTOMY      PORT SURGERY Left 7/6/2021    LEFT SUBCLAVIAN MEDI PORT INSERTION performed by Evin Taylor MD at [facility]       Family History   Problem Relation Age of Onset    Cancer Mother         lung     Social History     Tobacco Use    Smoking status: Former Smoker     Packs/day: 0.50    Smokeless tobacco: Former User     Quit date: 3/9/2022    Tobacco comment: 1/2 pack or less    Vaping Use    Vaping Use: Never used   Substance Use Topics    Alcohol use: Yes     Comment: occasionaly    Drug use: Never     Allergies   Allergen Reactions    Meperidine     Morphine     Sulfa Antibiotics Nausea And Vomiting and Other (See Comments)     fever     Current Outpatient Medications on File Prior to Encounter   Medication Sig Dispense Refill    Multiple Vitamins-Minerals (THERAPEUTIC MULTIVITAMIN-MINERALS) tablet Take 1 tablet by mouth daily       BIOTIN PO Take by mouth       NONFORMULARY Inject into the muscle every 21 days Chemo cocktail:  Pertuzumab, Trastuzumab, Hyaluronidase  Goes to [facility] for the injection        No current facility-administered medications on file prior to encounter.        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)    Ulcer of skin of breast (HCC) - Primary (Chronic)    Relevant Orders    Initiate Outpatient Wound Care Protocol    Late effect of radiation (Chronic)    Relevant Orders    Initiate Outpatient Wound Care Protocol    Wound dehiscence    Relevant Orders    Initiate Outpatient Wound Care Protocol          Pre Debridement Measurements:  Are located in the [place]  Documentation Flow Sheet  Post Debridement Measurements:  Wound/Ulcer Descriptions are Pre Debridement except measurements:     Wound 03/30/22 Breast Right #1 (Active)   Wound Image   04/27/22 1313   Dressing Status New dressing applied;Clean;Dry; Intact 05/18/22 1422   Wound Cleansed Cleansed with saline 05/18/22 1422   Dressing/Treatment Alginate;Dry dressing 05/18/22 1422   Offloading for Diabetic Foot Ulcers Offloading not required 05/04/22 1407   Wound Length (cm) 0.3 cm 05/18/22 1328   Wound Width (cm) 0.1 cm 05/18/22 1328   Wound Depth (cm) 0.1 cm 05/18/22 1328   Wound Surface Area (cm^2) 0.03 cm^2 05/18/22 1328   Change in Wound Size % (l*w) 95.38 05/18/22 1328   Wound Volume (cm^3) 0.003 cm^3 05/18/22 1328   Wound Healing % 98 05/18/22 1328   Post-Procedure Length (cm) 0.4 cm 05/18/22 1403   Post-Procedure Width (cm) 0.2 cm 05/18/22 1403   Post-Procedure Depth (cm) 0.1 cm 05/18/22 1403   Post-Procedure Surface Area (cm^2) 0.08 cm^2 05/18/22 1403   Post-Procedure Volume (cm^3) 0.008 cm^3 05/18/22 1403   Undermining Starts ___ O'Clock 3 04/06/22 1402   Undermining Ends___ O'Clock 8 04/06/22 1402   Undermining Maxium Distance (cm) 0.5 @ 7 04/06/22 1402   Wound Assessment Fibrin 05/18/22 1328   Drainage Amount Moderate 05/18/22 1328   Drainage Description Yellow 05/18/22 1328   Odor None 05/18/22 1328   Ling-wound Assessment Intact 05/18/22 1328   Wound Thickness Description not for Pressure Injury Full thickness 05/18/22 1328   Number of days: 49     Incision 07/06/21 Breast Right;Medial (Active)   Number of days: 316       Incision 02/08/22 Chest Right (Active)   Number of days: 99       Procedure Note  Indications:  Based on my examination of this patient's wound(s)/ulcer(s) today, debridement is required to promote healing and evaluate the wound base. Performed by: David Garza MD    Consent obtained:  Yes    Time out taken:  Yes    Pain Control: Anesthetic  Anesthetic: 4% Lidocaine Liquid Topical     Debridement:Non-excisional Debridement    Using curette the wound(s)/ulcer(s) was/were sharply debrided down through and including the removal of epidermis and dermis. Devitalized Tissue Debrided:  fibrin, slough and exudate to stimulate bleeding to promote healing, post debridement good bleeding base and wound edges noted    Wound/Ulcer #: 1    Percent of Wound/Ulcer Debrided: 100%    Total Surface Area Debrided:  0.03 sq cm     Estimated Blood Loss:  Minimal  Hemostasis Achieved:  by pressure    Procedural Pain:  3  / 10   Post Procedural Pain:  2 / 10     Response to treatment:  Well tolerated by patient. Plan:   Treatment Note please see attached Discharge Instructions    Written patient dismissal instructions given to patient and signed by patient or POA. Discharge Instructions       Visit Discharge/Physician Orders     Discharge condition: Stable     Assessment of pain at discharge: mild     Anesthetic used: lido 4%     Discharge to: Home     Left via:Private automobile     Accompanied by: accompanied by self     ECF/HHA: [facility]      Dressing Orders: To right lateral breast wound: Cleanse with normal saline, pack loosely with aquacel rope in upper right corner, cover and secure with dry dressing. Change daily. Remove aquacel dry, do not wet to remove.      Treatment Orders: Eat a diet high in protein and vitamin C.  Take a multiple vitamin daily unless contraindicated.     Do not get wet.       hyperbaric oxygen treatment     [facility] followup visit  1 week_________________________  (Please note your next appointment above and if you are unable to keep, kindly give a 24 hour notice.  Thank you.)     Physician signature:__________________________        If you experience any of the following, please call the [facility] [address] during business hours:     * Increase in Pain  * Temperature over 101  * Increase in drainage from your wound  * Drainage with a foul odor  * Bleeding  * Increase in swelling  * Need for compression bandage changes due to slippage, breakthrough drainage.     If you need medical attention outside of the business hours of the [address] please contact your PCP or go to the nearest emergency room.           Electronically signed by Tami Gottron, MD

## 2022-05-18 NOTE — PROGRESS NOTES
[facility]  Hyperbaric Oxygen Therapy   Progress Note    NAME: Seun HOYOS [facility]  MEDICAL RECORD NUMBER:  [id number]  AGE: 54 y.o. GENDER: female  : 1970  EPISODE DATE:  2022   Subjective   HBO Treatment Number: 8 out of Total Treatments: 20  HBO Diagnosis:   Problem List Items Addressed This Visit     Late effect of radiation - Primary (Chronic)        Safety checks performed prior to treatment. See doc flowsheets for documentation. Objective       No results for input(s): GLUMET in the last 72 hours. Pre treatment Vital Signs       Temp: 97 °F (36.1 °C)     Pulse: 70     Resp: 18     BP: (!) 98/54     Post treatment Vital Signs  Temp: 96.8 °F (36 °C)  Pulse: 72  Resp: 16  BP: (!) 140/66  Assessment      Physical Exam:  General Appearance:  alert and oriented to person, place and time, well-developed and well-nourished, in no acute distress  ENT:  tympanic membranes intact bilaterally  Pulmonary/Chest:  clear to auscultation bilaterally- no wheezes, rales or rhonchi, normal air movement, no respiratory distress  Cardiovascular:  regular rate and rhythm  Chamber #: 38  Treatment Start Time: 1323     Pressure Reached Time: 1342  FADI : 2.5  Number of Air Breaks:  Treatment Status: Air break X 2 for 5 minutes each     Decompression Time: 1524   Treatment End Time: 1539  Length of Treatment: 90 Minutes  Symptoms Noted During Treatment: None  Total Treatment Time (min): 136    Adverse Event: no    I was present on these premises and immediately available to furnish assistance & direction throughout the procedure. Plan      Liam Milner is a 54 y.o. female  did successfully complete today's hyperbaric oxygen treatment at [address] [place] [address]. In my clinical judgement, ongoing HBO therapy is  necessary at this time, given a threat to patient function, limb or life from the current condition.       Supervision and attendance of Hyperbaric Oxygen Therapy provided. Continue HBO treatment as outlined in the treatment plan. Hyperbaric Oxygen: Lennox Olga Rae tolerated Treatment Number: 8 well today without complications. Discharge Instructions were explained and given to Ms. Rae     Electronically signed by Estrellita Ch MD on 4/28/2022 at 8:07 AM

## 2022-05-18 NOTE — PROGRESS NOTES
[address]:     [facility] [address] [place], [address] [place] [address]  p: [phone] f: [phone]     Ordering Center:     [address]  [address] [address]  [phone]  WOUND CARE Dept: [address]ung [address] QYFVGC [phone]    Patient Information:      Greta Rae  [address]  [address] [id number]   I have attempted without success to contact this patient by phone for Preadmission Testing phone assessment. Message left for pt to return call. : 1970  AGE: 54 y.o. GENDER: female   EPISODE DATE: 2022    Insurance:      PRIMARY INSURANCE:  Plan: BCBS OUT OF STATE  Coverage: BCBS  Effective Date: 2018  Group Number: [unfilled]  Subscriber Number: [id number] - (To BCBS)    Payor/Plan Subscr  Sex Relation Sub. Ins. ID Effective Group Num   1. [address] M 1970 Female Self [id number]                                   [address]   2. MEDICAL [id number] [facility] 1968 Male Spouse [id number]                                   [address]       Patient Wound Information:      Problem List Items Addressed This Visit        Other    Ulcer of skin of breast ([facility] [address] - Primary (Chronic)    Relevant Orders    Initiate Outpatient Wound Care Protocol    Late effect of radiation (Chronic)    Relevant Orders    Initiate Outpatient Wound Care Protocol    Wound dehiscence    Relevant Orders    Initiate Outpatient Wound Care Protocol          WOUNDS REQUIRING DRESSING SUPPLIES:     Wound 22 Breast Right #1 (Active)   Wound Image   22 1313   Dressing Status New dressing applied;Clean;Dry; Intact 22 1422   Wound Cleansed Cleansed with saline 22 1422   Dressing/Treatment Alginate;Dry dressing 22 1422   Offloading for Diabetic Foot Ulcers Offloading not required 22 1407   Wound Length (cm) 0.3 cm 22 1328 Wound Width (cm) 0.1 cm 05/18/22 1328   Wound Depth (cm) 0.1 cm 05/18/22 1328   Wound Surface Area (cm^2) 0.03 cm^2 05/18/22 1328   Change in Wound Size % (l*w) 95.38 05/18/22 1328   Wound Volume (cm^3) 0.003 cm^3 05/18/22 1328   Wound Healing % 98 05/18/22 1328   Post-Procedure Length (cm) 0.4 cm 05/18/22 1403   Post-Procedure Width (cm) 0.2 cm 05/18/22 1403   Post-Procedure Depth (cm) 0.1 cm 05/18/22 1403   Post-Procedure Surface Area (cm^2) 0.08 cm^2 05/18/22 1403   Post-Procedure Volume (cm^3) 0.008 cm^3 05/18/22 1403   Undermining Starts ___ O'Clock 3 04/06/22 1402   Undermining Ends___ O'Clock 8 04/06/22 1402   Undermining Maxium Distance (cm) 0.5 @ 7 04/06/22 1402   Wound Assessment Fibrin 05/18/22 1328   Drainage Amount Moderate 05/18/22 1328   Drainage Description Yellow 05/18/22 1328   Odor None 05/18/22 1328   Ling-wound Assessment Intact 05/18/22 1328   Wound Thickness Description not for Pressure Injury Full thickness 05/18/22 1328   Number of days: 49     Incision 07/06/21 Breast Right;Medial (Active)   Number of days: 315       Incision 02/08/22 Chest Right (Active)   Number of days: 99       Supplies Requested :      WOUND #: 1   PRIMARY DRESSING:  Other: Ribbed aquacel rope   Cover and Secure with:  Other 6x6 excel SAP     FREQUENCY OF DRESSING CHANGES:  Daily         ADDITIONAL ITEMS:  [] Gloves Small  [x] Gloves Medium [] Gloves Large [] Gloves XLarge  [] Tape 1\" [x] Tape 2\" [] Tape 3\"  [] Medipore Tape  [x] Saline  [] Skin Prep   [] Adhesive Remover   [] Cotton Tip Applicators   [] Other:    Patient Wound(s) Debrided: [x] Yes if yes please add date 5/18/22   [] No    Debribement Type: Excisional/Sharp    Is the patient currently on an antibiotic for their Wound(s): [] Yes if yes please add name and dose    [x] No    Patient currently being seen by Home Health: [] Yes   [x] No    Duration for needed supplies:  []15  []30  []60  [x]90 Days    Electronically signed by Jorge Luis Mendiola RN on 5/18/2022 at 2:23 PM     Provider Information:      PROVIDER'S NAME:Dr. Estrellita Ch    NPI: [id number]

## 2022-05-19 ENCOUNTER — HOSPITAL ENCOUNTER (OUTPATIENT)
Dept: HYPERBARIC MEDICINE | Age: 52
Setting detail: THERAPIES SERIES
Discharge: HOME | End: 2022-05-19
Payer: COMMERCIAL

## 2022-05-19 VITALS
HEART RATE: 80 BPM | SYSTOLIC BLOOD PRESSURE: 122 MMHG | TEMPERATURE: 97.2 F | RESPIRATION RATE: 16 BRPM | DIASTOLIC BLOOD PRESSURE: 74 MMHG

## 2022-05-19 DIAGNOSIS — T66.XXXS LATE EFFECT OF RADIATION: Primary | ICD-10-CM

## 2022-05-19 PROCEDURE — G0277 HBOT, FULL BODY CHAMBER, 30M: HCPCS

## 2022-05-19 PROCEDURE — 99183 HYPERBARIC OXYGEN THERAPY: CPT | Performed by: SURGERY

## 2022-05-20 ENCOUNTER — HOSPITAL ENCOUNTER (OUTPATIENT)
Dept: HYPERBARIC MEDICINE | Age: 52
Setting detail: THERAPIES SERIES
Discharge: HOME | End: 2022-05-20
Payer: COMMERCIAL

## 2022-05-20 VITALS
HEART RATE: 76 BPM | SYSTOLIC BLOOD PRESSURE: 124 MMHG | TEMPERATURE: 96.9 F | DIASTOLIC BLOOD PRESSURE: 68 MMHG | RESPIRATION RATE: 16 BRPM

## 2022-05-20 DIAGNOSIS — T66.XXXS LATE EFFECT OF RADIATION: Primary | ICD-10-CM

## 2022-05-20 DIAGNOSIS — L59.8 RADIATION NECROSIS OF SKIN AND SUBCUTANEOUS: Chronic | ICD-10-CM
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
## 2022-05-20 PROCEDURE — G0277 HBOT, FULL BODY CHAMBER, 30M: HCPCS

## 2022-05-20 PROCEDURE — 99183 HYPERBARIC OXYGEN THERAPY: CPT | Performed by: SURGERY

## 2022-05-20 NOTE — PROGRESS NOTES
[facility]  Hyperbaric Oxygen Therapy   Progress Note    NAME: Sky Galeano [facility]  MEDICAL RECORD NUMBER:  [id number]  AGE: 54 y.o. GENDER: female  : 1970  EPISODE DATE:  2022   Subjective   HBO Treatment Number: 22 out of Total Treatments: 40  HBO Diagnosis:   Problem List Items Addressed This Visit     Late effect of radiation - Primary (Chronic)    Relevant Orders    Notify physician (specify)    Hyperbaric Oxygen Therapy        Safety checks performed prior to treatment. See doc flowsheets for documentation. Objective       No results for input(s): GLUMET in the last 72 hours. Pre treatment Vital Signs       Temp: 97.4 °F (36.3 °C)     Pulse: 86     Resp: 16     BP: 110/78     Post treatment Vital Signs  Temp: 97.2 °F (36.2 °C)  Pulse: 80  Resp: 16  BP: 122/74  Assessment      Physical Exam:  General Appearance:  alert and oriented to person, place and time, well-developed and well-nourished, in no acute distress  ENT:  tympanic membranes intact bilaterally  Pulmonary/Chest:  clear to auscultation bilaterally- no wheezes, rales or rhonchi, normal air movement, no respiratory distress  Cardiovascular:  regular rate and rhythm  Chamber #: 38  Treatment Start Time: 1419     Pressure Reached Time: 1433  FADI : 2.5  Number of Air Breaks:  Treatment Status: Air break X 2 for 5 minutes each     Decompression Time: 1616   Treatment End Time: 5854  Length of Treatment: 90 Minutes  Symptoms Noted During Treatment: None  Total Treatment Time (min): 128    Adverse Event: no    I was present on these premises and immediately available to furnish assistance & direction throughout the procedure. Laura      Jason Wise is a 54 y.o. female  did successfully complete today's hyperbaric oxygen treatment at [address] [place] [address].     In my clinical judgement, ongoing HBO therapy is  necessary at this time, given a threat to patient function, limb or life from the current condition. Supervision and attendance of Hyperbaric Oxygen Therapy provided. Continue HBO treatment as outlined in the treatment plan. Hyperbaric Oxygen: Nirmalalenore Moisepapito Rae tolerated Treatment Number: 22 well today without complications. Discharge Instructions were explained and given to Ms. Rae     Electronically signed by Tayler Conroy MD on 5/19/2022 at 10:28 PM

## 2022-05-20 NOTE — PROGRESS NOTES
[facility]  Hyperbaric Oxygen Therapy   Progress Note    NAME: Ramakrishna HOYOS [facility]  MEDICAL RECORD NUMBER:  [id number]  AGE: 54 y.o. 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 See doc flowsheets for documentation. Objective       No results for input(s): GLUMET in the last 72 hours. Pre treatment Vital Signs       Temp: 96.7 °F (35.9 °C)     Pulse: 84     Resp: 16     BP: 118/70     Post treatment Vital Signs  Temp: 96.9 °F (36.1 °C)  Pulse: 76  Resp: 16  BP: 124/68  Assessment      Physical Exam:  General Appearance:  alert and oriented to person, place and time, well-developed and well-nourished, in no acute distress  ENT:  tympanic membranes intact bilaterally  Pulmonary/Chest:  clear to auscultation bilaterally- no wheezes, rales or rhonchi, normal air movement, no respiratory distress  Cardiovascular:  regular rate and rhythm  Chamber #: 38  Treatment Start Time: 1218     Pressure Reached Time: 1233  FADI : 2.5  Number of Air Breaks:  Treatment Status: Air break X 2 for 5 minutes each     Decompression Time: 1404   Treatment End Time: 1431  Length of Treatment: 90 Minutes  Symptoms Noted During Treatment: None  Total Treatment Time (min): 133    Adverse Event: no    I was present on these premises and immediately available to furnish assistance & direction throughout the procedure. Laura      Sandra Parsons is a 54 y.o. female  did successfully complete today's hyperbaric oxygen treatment at [facility].     In my clinical judgement, ongoing HBO therapy is  necessary at this time, given a threat to patient function, limb or life from the current condition. Supervision and attendance of Hyperbaric Oxygen Therapy provided. Continue HBO treatment as outlined in the treatment plan. Hyperbaric Oxygen: Ignacio Rae tolerated Treatment Number: 23 well today without complications. Discharge Instructions were explained and given to Ms. Rae     Electronically signed by Fatuma Padgett MD on 5/20/2022 at 2:57 PM

## 2022-05-23 ENCOUNTER — HOSPITAL ENCOUNTER (OUTPATIENT)
Dept: HYPERBARIC MEDICINE | Age: 52
Setting detail: THERAPIES SERIES
Discharge: HOME | End: 2022-05-23
Payer: COMMERCIAL

## 2022-05-23 VITALS
HEART RATE: 72 BPM | DIASTOLIC BLOOD PRESSURE: 62 MMHG | TEMPERATURE: 96.3 F | RESPIRATION RATE: 16 BRPM | SYSTOLIC BLOOD PRESSURE: 110 MMHG

## 2022-05-23 DIAGNOSIS — T66.XXXS LATE EFFECT OF RADIATION: Primary | ICD-10-CM

## 2022-05-23 PROCEDURE — G0277 HBOT, FULL BODY CHAMBER, 30M: HCPCS

## 2022-05-23 PROCEDURE — 99183 HYPERBARIC OXYGEN THERAPY: CPT | Performed by: SURGERY

## 2022-05-23 NOTE — PROGRESS NOTES
[facility]  Hyperbaric Oxygen Therapy   Progress Note    NAME: Viviana HOYOS [facility]  MEDICAL RECORD NUMBER:  [id number]  AGE: 54 y.o. GENDER: female  : 1970  EPISODE DATE:  2022   Subjective   HBO Treatment Number: 19 out of Total Treatments: 20  HBO Diagnosis:   Problem List Items Addressed This Visit     Late effect of radiation - Primary (Chronic)        Safety checks performed prior to treatment by [facility]OT staff. See doc flowsheets for documentation. Objective       No results for input(s): GLUMET in the last 72 hours. Pre treatment Vital Signs       Temp: 97.1 °F (36.2 °C)     Pulse: 88     Resp: 16     BP: 132/86     Post treatment Vital Signs  Temp: 97.4 °F (36.3 °C)  Pulse: 80  Resp: 16  BP: 132/72  Assessment      Physical Exam:  General Appearance:  alert and oriented to person, place and time, well-developed and well-nourished, in no acute distress  ENT:  tympanic membranes intact bilaterally, normal color, normal light reflex bilaterally  Pulmonary/Chest:  clear to auscultation bilaterally- no wheezes, rales or rhonchi, normal air movement, no respiratory distress  Cardiovascular:  regular rate and rhythm  Chamber #: 39  Treatment Start Time: 0955     Pressure Reached Time: 1009  FADI : 2.5  Number of Air Breaks: 2  Treatment Status: Back to oxygen     Decompression Time: 1152   Treatment End Time: 1206  Length of Treatment: 90 Minutes  Symptoms Noted During Treatment: None  Total Treatment Time (min): 131    Adverse Event: no    I was present on these premises and immediately available to furnish assistance & direction throughout the procedure. Laura Rich is a 54 y.o. female  did successfully complete today's hyperbaric oxygen treatment at [address] [place] [address]. In my clinical judgement, ongoing HBO therapy is necessary at this time, given a threat to patient function, limb or life from the current condition. Supervision and attendance of Hyperbaric Oxygen Therapy provided. Continue HBO treatment as outlined in the treatment plan. Hyperbaric Oxygen: Ijeoma Rae tolerated Treatment Number: 63 well today without complications. Discharge Instructions were explained and given to Ms. Rae by [facility]OT staff    Electronically signed by Gabriel Salazar MD

## 2022-05-23 NOTE — PROGRESS NOTES
condition. Supervision and attendance of Hyperbaric Oxygen Therapy provided. Continue HBO treatment as outlined in the treatment plan. Hyperbaric Oxygen: Trand Davian Rae tolerated Treatment Number: 20 well today without complications. Discharge Instructions were explained and given to Ms. Rae by HBOT staff    Electronically signed by Philipp Myers MD

## 2022-05-24 ENCOUNTER — HOSPITAL ENCOUNTER (OUTPATIENT)
Dept: HYPERBARIC MEDICINE | Age: 52
Setting detail: THERAPIES SERIES
Discharge: HOME | End: 2022-05-24
Payer: COMMERCIAL

## 2022-05-24 VITALS
TEMPERATURE: 97.4 F | HEART RATE: 76 BPM | DIASTOLIC BLOOD PRESSURE: 62 MMHG | SYSTOLIC BLOOD PRESSURE: 128 MMHG | RESPIRATION RATE: 18 BRPM
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
## 2022-05-24 DIAGNOSIS — L59.8 RADIATION NECROSIS OF SKIN AND SUBCUTANEOUS: Chronic | ICD-10-CM

## 2022-05-24 DIAGNOSIS — T66.XXXS LATE EFFECT OF RADIATION: Primary | ICD-10-CM

## 2022-05-24 PROCEDURE — G0277 HBOT, FULL BODY CHAMBER, 30M: HCPCS

## 2022-05-24 PROCEDURE — 99183 HYPERBARIC OXYGEN THERAPY: CPT | Performed by: SURGERY

## 2022-05-24 NOTE — PROGRESS NOTES
[facility]  Hyperbaric Oxygen Therapy   Progress Note    NAME: Priscilla HOYOS [facility]  MEDICAL RECORD NUMBER:  [id number]  AGE: 54 y.o. 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 See doc flowsheets for documentation. Objective       No results for input(s): GLUMET in the last 72 hours. Pre treatment Vital Signs       Temp: 97 °F (36.1 °C)     Pulse: 76     Resp: 18     BP: 126/66     Post treatment Vital Signs  Temp: 97.4 °F (36.3 °C)  Pulse: 76  Resp: 18  BP: 128/62  Assessment      Physical Exam:  General Appearance:  alert and oriented to person, place and time, well-developed and well-nourished, in no acute distress  ENT:  tympanic membranes intact bilaterally  Pulmonary/Chest:  clear to auscultation bilaterally- no wheezes, rales or rhonchi, normal air movement, no respiratory distress  Cardiovascular:  regular rate and rhythm  Chamber #: 38  Treatment Start Time: 1228     Pressure Reached Time: 1242  FADI : 2.5  Number of Air Breaks:  Treatment Status: Air break X 2 for 5 minutes each     Decompression Time: 1421   Treatment End Time: 1443  Length of Treatment: 90 Minutes  Symptoms Noted During Treatment: None  Total Treatment Time (min): 135    Adverse Event: no    I was present on these premises and immediately available to furnish assistance & direction throughout the procedure. Laura Workmanra Jack is a 54 y.o. female  did successfully complete today's hyperbaric oxygen treatment at [address] [place] [address].     In my clinical judgement, ongoing HBO therapy is  necessary at this time,

## 2022-05-25 ENCOUNTER — HOSPITAL ENCOUNTER (OUTPATIENT)
Dept: WOUND CARE | Age: 52
Discharge: HOME | End: 2022-05-25
Payer: COMMERCIAL

## 2022-05-25 ENCOUNTER — HOSPITAL ENCOUNTER (OUTPATIENT)
Dept: HYPERBARIC MEDICINE | Age: 52
Setting detail: THERAPIES SERIES
Discharge: HOME | End: 2022-05-25
Payer: COMMERCIAL

## 2022-05-25 VITALS
DIASTOLIC BLOOD PRESSURE: 70 MMHG | HEART RATE: 72 BPM | RESPIRATION RATE: 16 BRPM | TEMPERATURE: 97.4 F | SYSTOLIC BLOOD PRESSURE: 162 MMHG

## 2022-05-25 DIAGNOSIS — T66.XXXS LATE EFFECT OF RADIATION: ICD-10-CM

## 2022-05-25 DIAGNOSIS — L98.499 ULCER OF SKIN OF BREAST (HCC): Primary | ICD-10-CM

## 2022-05-25 DIAGNOSIS — T81.30XA WOUND DEHISCENCE: ICD-10-CM

## 2022-05-25 DIAGNOSIS — T66.XXXS LATE EFFECT OF RADIATION: Primary | ICD-10-CM

## 2022-05-25 PROCEDURE — 99212 OFFICE O/P EST SF 10 MIN: CPT

## 2022-05-25 PROCEDURE — G0277 HBOT, FULL BODY CHAMBER, 30M: HCPCS

## 2022-05-25 PROCEDURE — 6370000000 HC RX 637 (ALT 250 FOR IP): Performed by: SURGERY

## 2022-05-25 PROCEDURE — 99213 OFFICE O/P EST LOW 20 MIN: CPT | Performed by: NURSE PRACTITIONER

## 2022-05-25 PROCEDURE — 99183 HYPERBARIC OXYGEN THERAPY: CPT | Performed by: SURGERY

## 2022-05-25 RX ORDER — BACITRACIN, NEOMYCIN, POLYMYXIN B 400; 3.5; 5 [USP'U]/G; MG/G; [USP'U]/G
OINTMENT TOPICAL ONCE
Status: CANCELLED | OUTPATIENT
Start: 2022-05-25 | End: 2022-05-25

## 2022-05-25 RX ORDER — LIDOCAINE 50 MG/G
OINTMENT TOPICAL ONCE
Status: CANCELLED | OUTPATIENT
Start: 2022-05-25 | End: 2022-05-25

## 2022-05-25 RX ORDER — LIDOCAINE HYDROCHLORIDE 40 MG/ML
SOLUTION TOPICAL ONCE
Status: COMPLETED | OUTPATIENT
Start: 2022-05-25 | End: 2022-05-25

## 2022-05-25 RX ORDER — GINSENG 100 MG
CAPSULE ORAL ONCE
Status: CANCELLED | OUTPATIENT
Start: 2022-05-25 | End: 2022-05-25

## 2022-05-25 RX ORDER — BETAMETHASONE DIPROPIONATE 0.05 %
OINTMENT (GRAM) TOPICAL ONCE
Status: CANCELLED | OUTPATIENT
Start: 2022-05-25 | End: 2022-05-25

## 2022-05-25 RX ORDER — BACITRACIN ZINC AND POLYMYXIN B SULFATE 500; 1000 [USP'U]/G; [USP'U]/G
OINTMENT TOPICAL ONCE
Status: CANCELLED | OUTPATIENT
Start: 2022-05-25 | End: 2022-05-25

## 2022-05-25 RX ORDER — LIDOCAINE HYDROCHLORIDE 40 MG/ML
SOLUTION TOPICAL ONCE
Status: CANCELLED | OUTPATIENT
Start: 2022-05-25 | End: 2022-05-25

## 2022-05-25 RX ORDER — LIDOCAINE 40 MG/G
CREAM TOPICAL ONCE
Status: CANCELLED | OUTPATIENT
Start: 2022-05-25 | End: 2022-05-25

## 2022-05-25 RX ORDER — CLOBETASOL PROPIONATE 0.5 MG/G
OINTMENT TOPICAL ONCE
Status: CANCELLED | OUTPATIENT
Start: 2022-05-25 | End: 2022-05-25

## 2022-05-25 RX ORDER — GENTAMICIN SULFATE 1 MG/G
OINTMENT TOPICAL ONCE
Status: CANCELLED | OUTPATIENT
Start: 2022-05-25 | End: 2022-05-25

## 2022-05-25 RX ORDER — LIDOCAINE HYDROCHLORIDE 20 MG/ML
JELLY TOPICAL ONCE
Status: CANCELLED | OUTPATIENT
Start: 2022-05-25 | End: 2022-05-25

## 2022-05-25 RX ADMIN — LIDOCAINE HYDROCHLORIDE 10 ML: 40 SOLUTION TOPICAL at 14:51

## 2022-05-25 NOTE — PROGRESS NOTES
Wound Healing Center Followup Visit Note    Referring Physician : Kelle Schaumann, MD Abdul Lineman [facility]  MEDICAL RECORD NUMBER:  [id number]  AGE: 54 y.o. GENDER: female  : 1970  EPISODE DATE:  2022    Subjective:     Chief Complaint   Patient presents with    Wound Check     Right breast      HISTORY of PRESENT ILLNESS HPI   Major Ro is a 54 y.o. female who presents today in regards to follow up evaluation and treatment of wound/ulcer. That patient's past medical, family and social hx were reviewed and changes were made if present. History of Wound Context:  Patient has had a wound of her right chest since 2022. She had her mastectomy wound dehisce. She had her first breast cancer at age 30.  She had undergone a lumpectomy with reexcision for margins and radiation at that time first at [facility] and then up at [facility]. Art Hinojosa was estrogen progesterone positive and HER-2 negative and was placed on tamoxifen then for 5 years. She had a recurrence in . It was estrogen progesterone negative and HER-2 positive.  She underwent an MRI of her breast and the tumor was only 1.6 cm in size.  Due to the recurrence she also underwent CAT scans and bone scans which were negative.  Due to the tumor being less than 2 cm in size we took her to the OR and she elected for bilateral mastectomy.  [place] lymph node was attempted but it turned into a right modified radical mastectomy. Marisol Bauer final pathology showed that she had multiple lymph nodes that were positive.  So she went into surgery and a stage Ia tumor and came out of surgery as a stage IIIa. She underwent PET scan in October which showed postoperative change.      At her second postoperative appointment she had a lot of drainage through the wound despite the FABIO drain that had been left.  And she dehisced in the middle to lateral aspect of the wound.  She was treated with wet-to-dry dressings.  When she got off her chemotherapy and onto immunotherapy she went back to surgery in 2/2022 for debridement and closure.  The wound was able to be closed but it had a little bit of tension medially.  She developed a seroma.      She has had a wound vac since ~ 2/2022. All of her sutures have been removed. On their initial visit to the wound healing center, 3/30/22  the patient has noted that the wound has been stable. The patient has not had similar previous wounds in the past.      Pt is not on abx at time of initial visit.     3/30/22  · Culture done  · aquacell packing  · Stop wound vac  · Screen for hbo  · Labs and cxr ordered   4/6/22  · Wound improved  4/13/22  · Improved  4/27/22  · Improved - at 7 oclcok - small area - pack  · Started hbo  · Some drainage from higher up towards axilla  5/4/22  · Wound improved  · Continue hbo  5/11/22  · Medial healed  · Lateral some drainage - small opening  · Continue hbo  5/18/22  · Lateral - small opening made slightly larger so packing could be placed  · Continue hbo    5/25/22  · Small opening to right lateral breast still present  · Clear non-malodorous drainage   · No debridement today   · Continue aquacel dressing      Wound/Ulcer Pain Timing/Severity: none  Quality of pain: N/A  Severity:  0 / 10   Modifying Factors: None  Associated Signs/Symptoms: drainage    Ulcer Identification:  Ulcer Type: non-healing surgical, soft tissue radionecrosis, compromised skin graft/flap and necrosis radiation  Contributing Factors: edema and decreased tissue oxygenation    Diabetic/Pressure/Non Pressure Ulcers only:  Ulcer: Non-Pressure ulcer, fat layer exposed  Wound: Wound dehiscence        PAST MEDICAL HISTORY      Diagnosis Date    Cancer [facility]) 2008    right breast    Late effect of radiation 3/30/2022    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    Ulcer of skin of breast ([facility] [address] 3/30/2022     Past Surgical History:   Procedure Laterality Date    BREAST BIOPSY Bilateral 7/6/2021    RIGHT MODIFIED RADICAL MASTECTOMY,  LEFT BREAST PROPHYLATIC MASTECTOMY performed by Pamela Moore MD at . [address] LUMPECTOMY Right     CARPAL TUNNEL RELEASE Right     CHEST WALL RESECTION Right 2/8/2022    RIGHT CHEST WALL COMPLEX WOUND CLOSURE AND WOUND VAC performed by Pamela Moore MD at [facility] PARTIAL HYSTERECTOMY      PORT SURGERY Left 7/6/2021    LEFT SUBCLAVIAN MEDI PORT INSERTION performed by Pamela Moore MD at [facility]       Family History   Problem Relation Age of Onset    Cancer Mother         lung     Social History     Tobacco Use    Smoking status: Former Smoker     Packs/day: 0.50    Smokeless tobacco: Former User     Quit date: 3/9/2022    Tobacco comment: 1/2 pack or less    Vaping Use    Vaping Use: Never used   Substance Use Topics    Alcohol use: Yes     Comment: occasionaly    Drug use: Never     Allergies   Allergen Reactions    Meperidine     Morphine     Sulfa Antibiotics Nausea And Vomiting and Other (See Comments)     fever     Current Outpatient Medications on File Prior to Encounter   Medication Sig Dispense Refill    Multiple Vitamins-Minerals (THERAPEUTIC MULTIVITAMIN-MINERALS) tablet Take 1 tablet by mouth daily       BIOTIN PO Take by mouth       NONFORMULARY Inject into the muscle every 21 days Chemo cocktail:  Pertuzumab, Trastuzumab, Hyaluronidase  Goes to [facility] for the injection        No current facility-administered medications on file prior to encounter. REVIEW OF SYSTEMS See HPI    Objective: There were no vitals taken for this visit.   Wt Readings from Last 3 Encounters:   05/18/22 145 lb (65.8 kg)   05/11/22 145 lb (65.8 kg)   05/04/22 145 lb (65.8 kg)     PHYSICAL EXAM  CONSTITUTIONAL:   Awake, alert, cooperative   EYES:  lids and lashes normal   ENT: external ears and nose without lesions   NECK:  supple, symmetrical, trachea midline   SKIN:  Open wound present    Assessment: Problem List Items Addressed This Visit     Late effect of radiation (Chronic)    Relevant Orders    Initiate Outpatient Wound Care Protocol    Ulcer of skin of breast ([facility] [address] - Primary (Chronic)    Relevant Orders    Initiate Outpatient Wound Care Protocol    Wound dehiscence    Relevant Orders    Initiate Outpatient Wound Care Protocol          Pre Debridement Measurements:  Are located in the [facility]  Documentation Flow Sheet  Post Debridement Measurements:  Wound/Ulcer Descriptions are Pre Debridement except measurements:     Wound 03/30/22 Breast Right #1 (Active)   Wound Image   05/25/22 1452   Dressing Status New dressing applied 05/25/22 1510   Wound Cleansed Cleansed with saline 05/25/22 1510   Dressing/Treatment Alginate;Dry dressing 05/25/22 1510   Offloading for Diabetic Foot Ulcers Offloading not required 05/25/22 1510   Wound Length (cm) 0.1 cm 05/25/22 1452   Wound Width (cm) 0.1 cm 05/25/22 1452   Wound Depth (cm) 0.1 cm 05/25/22 1452   Wound Surface Area (cm^2) 0.01 cm^2 05/25/22 1452   Change in Wound Size % (l*w) 98.46 05/25/22 1452   Wound Volume (cm^3) 0.001 cm^3 05/25/22 1452   Wound Healing % 99 05/25/22 1452   Post-Procedure Length (cm) 0.1 cm 05/25/22 1502   Post-Procedure Width (cm) 0.1 cm 05/25/22 1502   Post-Procedure Depth (cm) 0.1 cm 05/25/22 1502   Post-Procedure Surface Area (cm^2) 0.01 cm^2 05/25/22 1502   Post-Procedure Volume (cm^3) 0.001 cm^3 05/25/22 1502   Undermining Starts ___ O'Clock 3 04/06/22 1402   Undermining Ends___ O'Clock 8 04/06/22 1402   Undermining Maxium Distance (cm) 0.5 @ 7 04/06/22 1402   Wound Assessment Dry;Fibrin 05/25/22 1452   Drainage Amount Scant 05/25/22 1452   Drainage Description Yellow 05/25/22 1452   Odor None 05/25/22 1452   Ling-wound Assessment Intact 05/25/22 1452   Wound Thickness Description not for Pressure Injury Full thickness 05/18/22 1328   Number of days: 56     Incision 07/06/21 Breast Right;Medial (Active)   Number of days: 323 Incision 02/08/22 Chest Right (Active)   Number of days: 106       Procedure Note  Indications:  Based on my examination of this patient's wound(s)/ulcer(s) today, debridement is not required to promote healing and evaluate the wound base. Performed by: Willy Osler, APRN - CNP    Consent obtained:  Yes    Time out taken:  Yes    Pain Control: Anesthetic  Anesthetic: 4% Lidocaine Liquid Topical     Debridement:Other (comment)no debridement       Plan:   Treatment Note please see attached Discharge Instructions    Written patient dismissal instructions given to patient and signed by patient or POA. Discharge Instructions       Visit Discharge/Physician Orders     Discharge condition: Stable     Assessment of pain at discharge: mild     Anesthetic used: lido 4%     Discharge to: Home     Left via:Private automobile     Accompanied by: accompanied by self     ECF/HHA: [facility]      Dressing Orders: To right lateral breast wound: Cleanse with normal saline, pack loosely with aquacel rope in upper right corner, cover and secure with dry dressing. Change daily. Remove aquacel dry, do not wet to remove.      Treatment Orders: Eat a diet high in protein and vitamin C. Take a multiple vitamin daily unless contraindicated.     Do not get wet.       hyperbaric oxygen treatment     [facility] followup visit  1 week_________________________  (Please note your next appointment above and if you are unable to keep, kindly give a 24 hour notice.  Thank you.)     Physician signature:__________________________        If you experience any of the following, please call the [facility] [address] during business hours:     * Increase in Pain  * Temperature over 101  * Increase in drainage from your wound  * Drainage with a foul odor  * Bleeding  * Increase in swelling  * Need for compression bandage changes due to slippage, breakthrough drainage.     If you need medical attention outside of the business hours of the Wound Care Centers please contact your PCP or go to the nearest emergency room.          Electronically signed by NANCY Cool CNP

## 2022-05-26 ENCOUNTER — HOSPITAL ENCOUNTER (OUTPATIENT)
Dept: HYPERBARIC MEDICINE | Age: 52
Setting detail: THERAPIES SERIES
Discharge: HOME | End: 2022-05-26
Payer: COMMERCIAL

## 2022-05-26 VITALS
RESPIRATION RATE: 18 BRPM | TEMPERATURE: 97.3 F | DIASTOLIC BLOOD PRESSURE: 68 MMHG | SYSTOLIC BLOOD PRESSURE: 152 MMHG | HEART RATE: 72 BPM

## 2022-05-26 DIAGNOSIS — T66.XXXS LATE EFFECT OF RADIATION: Primary | ICD-10-CM

## 2022-05-26 PROCEDURE — G0277 HBOT, FULL BODY CHAMBER, 30M: HCPCS

## 2022-05-26 PROCEDURE — 99183 HYPERBARIC OXYGEN THERAPY: CPT | Performed by: SURGERY

## 2022-05-26 NOTE — PROGRESS NOTES
[facility]  Hyperbaric Oxygen Therapy   Progress Note    NAME: Rei Cuellar [facility]  MEDICAL RECORD NUMBER:  [id number]  AGE: 54 y.o. 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 See doc flowsheets for documentation. Objective       No results for input(s): GLUMET in the last 72 hours. Pre treatment Vital Signs       Temp: 97.5 °F (36.4 °C)     Heart Rate: 76     Resp: 16     BP: 120/76     Post treatment Vital Signs  Temp: 97.4 °F (36.3 °C)  Heart Rate: 72  Resp: 16  BP: (!) 162/70  Assessment      Physical Exam:  General Appearance:  alert and oriented to person, place and time, well-developed and well-nourished, in no acute distress  ENT:  tympanic membranes intact bilaterally  Pulmonary/Chest:  clear to auscultation bilaterally- no wheezes, rales or rhonchi, normal air movement, no respiratory distress  Cardiovascular:  regular rate and rhythm  Chamber #: 38  Treatment Start Time: 1210     Pressure Reached Time: 1224  FADI : 2.5  Number of Air Breaks:  Treatment Status: Air break X 2 for 5 minutes each     Decompression Time: 1407   Treatment End Time: 1417  Length of Treatment: 90 Minutes  Symptoms Noted During Treatment: None  Total Treatment Time (min): 127    Adverse Event: no    I was present on these premises and immediately available to furnish assistance & direction throughout the procedure. Plan      Rachel Machado is a 54 y.o. female  did successfully complete today's hyperbaric oxygen treatment at [address] [place] [address]. In my clinical judgement, ongoing HBO therapy is  necessary at this time, given a threat to patient function, limb or life from the current condition.       Supervision and attendance of Hyperbaric Oxygen Therapy provided. Continue HBO treatment as outlined in the treatment plan. Hyperbaric Oxygen: Vinnie Rae tolerated Treatment Number: 26 well today without complications. Discharge Instructions were explained and given to Ms. Rae     Electronically signed by Yves Wilson MD on 5/25/2022 at 4:39 PM

## 2022-05-26 NOTE — PROGRESS NOTES
[facility]  Hyperbaric Oxygen Therapy   Progress Note    NAME: Greta Arredondo [facility]  MEDICAL RECORD NUMBER:  [id number]  AGE: 54 y.o. 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 See doc flowsheets for documentation. Objective       No results for input(s): GLUMET in the last 72 hours. Pre treatment Vital Signs       Temp: 97 °F (36.1 °C)     Heart Rate: 76     Resp: 18     BP: (!) 158/70     Post treatment Vital Signs  Temp: 97.3 °F (36.3 °C)  Heart Rate: 72  Resp: 18  BP: (!) 152/68  Assessment      Physical Exam:  General Appearance:  alert and oriented to person, place and time, well-developed and well-nourished, in no acute distress  ENT:  tympanic membranes intact bilaterally  Pulmonary/Chest:  clear to auscultation bilaterally- no wheezes, rales or rhonchi, normal air movement, no respiratory distress  Cardiovascular:  regular rate and rhythm  Chamber #: 38  Treatment Start Time: 1203     Pressure Reached Time: 1223  FADI : 2.5  Number of Air Breaks:  Treatment Status: Air break X 2 for 5 minutes each     Decompression Time: 1407   Treatment End Time: 1418  Length of Treatment: 90 Minutes  Symptoms Noted During Treatment: None  Total Treatment Time (min): 135    Adverse Event: no    I was present on these premises and immediately available to furnish assistance & direction throughout the procedure. Laura Gagnon is a 54 y.o. female  did successfully complete today's hyperbaric oxygen treatment at [address] [place] [address].     In my clinical judgement, ongoing HBO therapy is  necessary at this time, given a threat to patient function, limb or life from the current condition. Supervision and attendance of Hyperbaric Oxygen Therapy provided. Continue HBO treatment as outlined in the treatment plan. Hyperbaric Oxygen: Ignacio Rae tolerated Treatment Number: 27 well today without complications. Discharge Instructions were explained and given to Ms. Rae     Electronically signed by Flower Deng MD on 5/26/2022 at 4:41 PM

## 2022-05-27 ENCOUNTER — HOSPITAL ENCOUNTER (OUTPATIENT)
Dept: HYPERBARIC MEDICINE | Age: 52
Setting detail: THERAPIES SERIES
Discharge: HOME | End: 2022-05-27
Payer: COMMERCIAL

## 2022-05-27 VITALS
DIASTOLIC BLOOD PRESSURE: 80 MMHG | SYSTOLIC BLOOD PRESSURE: 124 MMHG | HEART RATE: 72 BPM | TEMPERATURE: 97.6 F | RESPIRATION RATE: 18 BRPM

## 2022-05-27 DIAGNOSIS — T66.XXXS LATE EFFECT OF RADIATION: Primary | ICD-10-CM

## 2022-05-27 DIAGNOSIS — L59.8 RADIATION NECROSIS OF SKIN AND SUBCUTANEOUS: Chronic | ICD-10-CM
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
## 2022-05-27 PROCEDURE — G0277 HBOT, FULL BODY CHAMBER, 30M: HCPCS

## 2022-05-27 PROCEDURE — 99183 HYPERBARIC OXYGEN THERAPY: CPT | Performed by: SURGERY

## 2022-05-27 NOTE — PROGRESS NOTES
[facility] [address]  Hyperbaric Oxygen Therapy   Progress Note    NAME: Emigdio Steven [facility]  MEDICAL RECORD NUMBER:  [id number]  AGE: 54 y.o. 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 See doc flowsheets for documentation. Objective       No results for input(s): GLUMET in the last 72 hours. Pre treatment Vital Signs       Temp: 98.6 °F (37 °C)     Heart Rate: 70     Resp: 19     BP: 122/82     Post treatment Vital Signs  Temp: 97.6 °F (36.4 °C)  Heart Rate: 72  Resp: 18  BP: 124/80  Assessment      Physical Exam:  General Appearance:  alert and oriented to person, place and time, well-developed and well-nourished, in no acute distress  ENT:  tympanic membranes intact bilaterally  Pulmonary/Chest:  clear to auscultation bilaterally- no wheezes, rales or rhonchi, normal air movement, no respiratory distress  Cardiovascular:  regular rate and rhythm  Chamber #: 38  Treatment Start Time: 1217     Pressure Reached Time: 1234  FADI : 2.5  Number of Air Breaks:  Treatment Status: Air break X 2 for 5 minutes each     Decompression Time: 1420   Treatment End Time: 1430  Length of Treatment: 90 Minutes  Symptoms Noted During Treatment: None  Total Treatment Time (min): 133    Adverse Event: no    I was present on these premises and immediately available to furnish assistance & direction throughout the procedure. Plan      Karl Hopkins is a 54 y.o. female  did successfully complete today's hyperbaric oxygen treatment at [address] [place] [address].     In my clinical judgement, ongoing HBO therapy is  necessary at this time, given a threat to patient function, limb or life from the current condition. Supervision and attendance of Hyperbaric Oxygen Therapy provided. Continue HBO treatment as outlined in the treatment plan. Hyperbaric Oxygen: Farhanaholly Rae tolerated Treatment Number: 28 well today without complications. Discharge Instructions were explained and given to Ms. Rae     Electronically signed by Lakshmi Davis MD on 5/27/2022 at 3:16 PM

## 2022-05-31 ENCOUNTER — HOSPITAL ENCOUNTER (OUTPATIENT)
Dept: HYPERBARIC MEDICINE | Age: 52
Setting detail: THERAPIES SERIES
Discharge: HOME | End: 2022-05-31
Payer: COMMERCIAL

## 2022-05-31 VITALS
SYSTOLIC BLOOD PRESSURE: 144 MMHG | TEMPERATURE: 97.8 F | HEART RATE: 84 BPM | RESPIRATION RATE: 18 BRPM | DIASTOLIC BLOOD PRESSURE: 70 MMHG

## 2022-05-31 DIAGNOSIS — T66.XXXS LATE EFFECT OF RADIATION: Primary | ICD-10-CM

## 2022-05-31 PROCEDURE — 99183 HYPERBARIC OXYGEN THERAPY: CPT | Performed by: SURGERY

## 2022-05-31 PROCEDURE — G0277 HBOT, FULL BODY CHAMBER, 30M: HCPCS

## 2022-05-31 NOTE — DISCHARGE INSTR - COC
Continuity of Care Form    Patient Name: Narendra Placido   :  1970  MRN:  [id number]    Admit date:  (Not on file)  Discharge date:  ***    Code Status Order: Prior   Advance Directives:      Admitting Physician:  No admitting provider for patient encounter. PCP: Neelima Vásquez MD    Discharging Nurse: [facility] Unit/Room#: No information available for this encounter.   Discharging Unit Phone Number: ***    Emergency Contact:   Extended Emergency Contact Information  Primary Emergency Contact: Lucio Ac  [address] [facility] [address]  Phone: [phone]  Mobile Phone: [phone]  Relation: Spouse  Preferred language: English  Secondary Emergency Contact: [facility] Phone: [phone]  Mobile Phone: [phone]  Relation: Child    Past Surgical History:  Past Surgical History:   Procedure Laterality Date    BREAST BIOPSY Bilateral 2021    RIGHT MODIFIED RADICAL MASTECTOMY,  LEFT BREAST PROPHYLATIC MASTECTOMY performed by Kip Strickland MD at [place] [address] LUMPECTOMY Right     CARPAL TUNNEL RELEASE Right     CHEST WALL RESECTION Right 2022    RIGHT CHEST WALL COMPLEX WOUND CLOSURE AND WOUND VAC performed by Kip Strickland MD at [facility] Left 2021    LEFT SUBCLAVIAN MEDI PORT INSERTION performed by Kip Strickland MD at [address] [place] [address]         Immunization History:   Immunization History   Administered Date(s) Administered    COVID-19, Pfizer Purple top, DILUTE for use, 12+ yrs, 30mcg/0.3mL dose 2021, 2021, 09/10/2021       Active Problems:  Patient Active Problem List   Diagnosis Code    Malignant neoplasm of central portion of right breast in female, estrogen receptor negative ([address] C50.111, Z17.1    Recurrent breast cancer, right ([address] C50.911    S/P mastectomy, bilateral Z90.13    Wound dehiscence T81.30XA 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 Dio Hdz       Isolation/Infection:   Isolation            No Isolation          Patient Infection Status       None to display            Nurse Assessment:  Last Vital Signs: There were no vitals taken for this visit.     Last documented pain score (0-10 scale):    Last Weight:   Wt Readings from Last 1 Encounters:   05/18/22 145 lb (65.8 kg)     Mental Status:  {IP PT MENTAL STATUS:20030}    IV Access:  [facility] LUCA IV ACCESS:304153798}    Nursing Mobility/ADLs:  Walking   {CHP DME MWHF:983956820}  Transfer  {CHP DME WUBZ:316030897}  Bathing  {CHP DME EIMD:698077767}  Dressing  {CHP DME TQAO:034122769}  Toileting  {CHP DME QJCM:889056339}  Feeding  {CHP DME COVM:298654724}  Med Admin  {CHP DME UWLV:293311555}  Med Delivery   {[facility] MED Delivery:359714968}    Wound Care Documentation and Therapy:  Wound 03/30/22 Breast Right #1 (Active)   Wound Image   05/25/22 1452   Dressing Status New dressing applied 05/25/22 1510   Wound Cleansed Cleansed with saline 05/25/22 1510   Dressing/Treatment Alginate;Dry dressing 05/25/22 1510   Offloading for Diabetic Foot Ulcers Offloading not required 05/25/22 1510   Wound Length (cm) 0.1 cm 05/25/22 1452   Wound Width (cm) 0.1 cm 05/25/22 1452   Wound Depth (cm) 0.1 cm 05/25/22 1452   Wound Surface Area (cm^2) 0.01 cm^2 05/25/22 1452   Change in Wound Size % (l*w) 98.46 05/25/22 1452   Wound Volume (cm^3) 0.001 cm^3 05/25/22 1452   Wound Healing % 99 05/25/22 1452   Post-Procedure Length (cm) 0.1 cm 05/25/22 1502   Post-Procedure Width (cm) 0.1 cm 05/25/22 1502   Post-Procedure Depth (cm) 0.1 cm 05/25/22 1502   Post-Procedure Surface Area (cm^2) 0.01 cm^2 05/25/22 1502   Post-Procedure Volume (cm^3) 0.001 cm^3 05/25/22 1502   Wound Assessment Dry;Fibrin 05/25/22 1452   Drainage Amount Scant 05/25/22 1452   Drainage Description Yellow 05/25/22 1452   Odor None 22 1452   Ling-wound Assessment Intact 22 1452   Wound Thickness Description not for Pressure Injury Full thickness 22 1328   Number of days: 62       Incision 21 Breast Right;Medial (Active)   Number of days: 329       Incision 22 Chest Right (Active)   Number of days: 112        Elimination:  Continence: Bowel: {YES / EW:90382}  Bladder: {YES / XV:03473}  Urinary Catheter: {Urinary Catheter:470469597}   Colostomy/Ileostomy/Ileal Conduit: {YES / KN:13144}       Date of Last BM: ***  No intake or output data in the 24 hours ending 22 1650  No intake/output data recorded.     Safety Concerns:     [address] Safety Concerns:187551744}    Impairments/Disabilities:      [address] Impairments/Disabilities:414149065}    Nutrition Therapy:  Current Nutrition Therapy:   [address] Diet List:827601268}    Routes of Feeding: {CHP DME Other Feedings:128085679}  Liquids: {Slp liquid thickness:23845}  Daily Fluid Restriction: {CHP DME Yes amt example:796624228}  Last Modified Barium Swallow with Video (Video Swallowing Test): {Done Not Done DLBM:831342269}    Treatments at the Time of Hospital Discharge:   Respiratory Treatments: ***  Oxygen Therapy:  {Therapy; copd oxygen:07699}  Ventilator:    {MH CC Vent LWXE:383019000}    Rehab Therapies: {THERAPEUTIC INTERVENTION:3886710709}  Weight Bearing Status/Restrictions: [address] [facility]  Weight Bearin}  Other Medical Equipment (for information only, NOT a DME order):  {EQUIPMENT:713296897}  Other Treatments: ***    Patient's personal belongings (please select all that are sent with patient):  {P DME Belongings:145408947}    RN SIGNATURE:  {Esignature:524902440}    CASE MANAGEMENT/SOCIAL WORK SECTION    Inpatient Status Date: ***    Readmission Risk Assessment Score:  Readmission Risk              Risk of Unplanned Readmission:  0           Discharging to Facility/ Agency   Name:   Address:  Phone:  Fax:    Dialysis Facility (if applicable) Name:  Address:  Dialysis Schedule:  Phone:  Fax:    / signature: {Esignature:331483962}    PHYSICIAN SECTION    Prognosis: {Prognosis:1275502349}    Condition at Discharge: Lani Willingham Patient Condition:877864804}    Rehab Potential (if transferring to Rehab): {Prognosis:6778224777}    Recommended Labs or Other Treatments After Discharge: ***    Physician Certification: I certify the above information and transfer of Kristine Smith  is necessary for the continuing treatment of the diagnosis listed and that she requires {Admit to Appropriate Level of Care:99896} for {GREATER/LESS:432173740} 30 days.      Update Admission H&P: {CHP DME Changes in FPZAO:207250160}    PHYSICIAN SIGNATURE:  {Esignature:980558714}

## 2022-05-31 NOTE — PROGRESS NOTES
[facility]  Hyperbaric Oxygen Therapy   Progress Note    NAME: Chinyere HOYOS [facility]  MEDICAL RECORD NUMBER:  [id number]  AGE: 54 y.o. 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 See doc flowsheets for documentation. Objective       No results for input(s): GLUMET in the last 72 hours. Pre treatment Vital Signs       Temp: 97.4 °F (36.3 °C)     Heart Rate: 96     Resp: 18     BP: 120/72     Post treatment Vital Signs  Temp: 97.8 °F (36.6 °C)  Heart Rate: 84  Resp: 18  BP: (!) 144/70  Assessment      Physical Exam:  General Appearance:  alert and oriented to person, place and time, well-developed and well-nourished, in no acute distress  ENT:  tympanic membranes intact bilaterally  Pulmonary/Chest:  clear to auscultation bilaterally- no wheezes, rales or rhonchi, normal air movement, no respiratory distress  Cardiovascular:  regular rate and rhythm  Chamber #: 38  Treatment Start Time: 729     Pressure Reached Time: 45  FADI : 2.5  Number of Air Breaks:  Treatment Status: Air break X 2 for 5 minutes each     Decompression Time: 927   Treatment End Time: 09  Length of Treatment: 90 Minutes  Symptoms Noted During Treatment: None  Total Treatment Time (min): 133    Adverse Event: no    I was present on these premises and immediately available to furnish assistance & direction throughout the procedure. Laura      Nikia Quach is a 54 y.o. female  did successfully complete today's hyperbaric oxygen treatment at [address] [place] [address].     In my clinical judgement, ongoing HBO therapy is  necessary at this time, given a threat to patient function, limb or life from the current condition. Supervision and attendance of Hyperbaric Oxygen Therapy provided. Continue HBO treatment as outlined in the treatment plan. Hyperbaric Oxygen: Macriholly Rae tolerated Treatment Number: 87 well today without complications. Discharge Instructions were explained and given to Ms. Rae     Electronically signed by Regis Barrios MD on 5/31/2022 at 3:21 PM

## 2022-06-01 ENCOUNTER — HOSPITAL ENCOUNTER (OUTPATIENT)
Dept: WOUND CARE | Age: 52
Discharge: HOME | End: 2022-06-01
Payer: COMMERCIAL

## 2022-06-01 ENCOUNTER — HOSPITAL ENCOUNTER (OUTPATIENT)
Dept: HYPERBARIC MEDICINE | Age: 52
Discharge: HOME | End: 2022-06-01
Payer: COMMERCIAL

## 2022-06-01 VITALS
TEMPERATURE: 97 F | HEART RATE: 84 BPM | RESPIRATION RATE: 18 BRPM | SYSTOLIC BLOOD PRESSURE: 126 MMHG | DIASTOLIC BLOOD PRESSURE: 68 MMHG

## 2022-06-01 DIAGNOSIS — L59.8 RADIATION NECROSIS OF SKIN AND SUBCUTANEOUS: Chronic | ICD-10-CM

## 2022-06-01 DIAGNOSIS — T81.30XA WOUND DEHISCENCE: ICD-10-CM
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
## 2022-06-01 DIAGNOSIS — T66.XXXS LATE EFFECT OF RADIATION: Primary | ICD-10-CM

## 2022-06-01 DIAGNOSIS — L98.499 ULCER OF SKIN OF BREAST (HCC): Primary | ICD-10-CM

## 2022-06-01 DIAGNOSIS — T66.XXXS LATE EFFECT OF RADIATION: ICD-10-CM

## 2022-06-01 PROCEDURE — 99213 OFFICE O/P EST LOW 20 MIN: CPT | Performed by: SURGERY

## 2022-06-01 PROCEDURE — 99212 OFFICE O/P EST SF 10 MIN: CPT

## 2022-06-01 PROCEDURE — G0277 HBOT, FULL BODY CHAMBER, 30M: HCPCS

## 2022-06-01 PROCEDURE — 99183 HYPERBARIC OXYGEN THERAPY: CPT | Performed by: SURGERY

## 2022-06-01 RX ORDER — CLOBETASOL PROPIONATE 0.5 MG/G
OINTMENT TOPICAL ONCE
Status: CANCELLED | OUTPATIENT
Start: 2022-06-01 | End: 2022-06-01

## 2022-06-01 RX ORDER — LIDOCAINE 40 MG/G
CREAM TOPICAL ONCE
Status: CANCELLED | OUTPATIENT
Start: 2022-06-01 | End: 2022-06-01

## 2022-06-01 RX ORDER — LIDOCAINE HYDROCHLORIDE 20 MG/ML
JELLY TOPICAL ONCE
Status: CANCELLED | OUTPATIENT
Start: 2022-06-01 | End: 2022-06-01

## 2022-06-01 RX ORDER — GINSENG 100 MG
CAPSULE ORAL ONCE
Status: CANCELLED | OUTPATIENT
Start: 2022-06-01 | End: 2022-06-01

## 2022-06-01 RX ORDER — LIDOCAINE 50 MG/G
OINTMENT TOPICAL ONCE
Status: CANCELLED | OUTPATIENT
Start: 2022-06-01 | End: 2022-06-01

## 2022-06-01 RX ORDER — BACITRACIN, NEOMYCIN, POLYMYXIN B 400; 3.5; 5 [USP'U]/G; MG/G; [USP'U]/G
OINTMENT TOPICAL ONCE
Status: CANCELLED | OUTPATIENT
Start: 2022-06-01 | End: 2022-06-01

## 2022-06-01 RX ORDER — BETAMETHASONE DIPROPIONATE 0.05 %
OINTMENT (GRAM) TOPICAL ONCE
Status: CANCELLED | OUTPATIENT
Start: 2022-06-01 | End: 2022-06-01

## 2022-06-01 RX ORDER — LIDOCAINE HYDROCHLORIDE 40 MG/ML
SOLUTION TOPICAL ONCE
Status: CANCELLED | OUTPATIENT
Start: 2022-06-01 | End: 2022-06-01

## 2022-06-01 RX ORDER — GENTAMICIN SULFATE 1 MG/G
OINTMENT TOPICAL ONCE
Status: CANCELLED | OUTPATIENT
Start: 2022-06-01 | End: 2022-06-01

## 2022-06-01 RX ORDER — BACITRACIN ZINC AND POLYMYXIN B SULFATE 500; 1000 [USP'U]/G; [USP'U]/G
OINTMENT TOPICAL ONCE
Status: CANCELLED | OUTPATIENT
Start: 2022-06-01 | End: 2022-06-01

## 2022-06-01 RX ORDER — LIDOCAINE HYDROCHLORIDE 40 MG/ML
SOLUTION TOPICAL ONCE
Status: COMPLETED | OUTPATIENT
Start: 2022-06-01 | End: 2022-06-01

## 2022-06-01 RX ADMIN — LIDOCAINE HYDROCHLORIDE: 40 SOLUTION TOPICAL at 10:28

## 2022-06-01 NOTE — PROGRESS NOTES
Wound Healing Center Followup Visit Note    Referring Physician : MD Israel Recinos [facility]  MEDICAL RECORD NUMBER:  [id number]  AGE: 54 y.o. GENDER: female  : 1970  EPISODE DATE:  2022    Subjective:     Chief Complaint   Patient presents with    Wound Check     right breast      HISTORY of PRESENT ILLNESS HPI   Hunter Landaverde is a 54 y.o. female who presents today in regards to follow up evaluation and treatment of wound/ulcer. That patient's past medical, family and social hx were reviewed and changes were made if present. History of Wound Context:  Patient has had a wound of her right chest since 2022. She had her mastectomy wound dehisce. She had her first breast cancer at age 30.  She had undergone a lumpectomy with reexcision for margins and radiation at that time first at [facility] and then up at [facility]. Hamida Roman was estrogen progesterone positive and HER-2 negative and was placed on tamoxifen then for 5 years. She had a recurrence in . It was estrogen progesterone negative and HER-2 positive.  She underwent an MRI of her breast and the tumor was only 1.6 cm in size.  Due to the recurrence she also underwent CAT scans and bone scans which were negative.  Due to the tumor being less than 2 cm in size we took her to the OR and she elected for bilateral mastectomy.  [employer] lymph node was attempted but it turned into a right modified radical mastectomy. MARY SHAH[facility] final pathology showed that she had multiple lymph nodes that were positive.  So she went into surgery and a stage Ia tumor and came out of surgery as a stage IIIa. She underwent PET scan in October which showed postoperative change.      At her second postoperative appointment she had a lot of drainage through the wound despite the FABIO drain that had been left.  And she dehisced in the middle to lateral aspect of the wound.  She was treated with wet-to-dry dressings.  When she got off her chemotherapy and onto immunotherapy she went back to surgery in 2/2022 for debridement and closure.  The wound was able to be closed but it had a little bit of tension medially.  She developed a seroma.      She has had a wound vac since ~ 2/2022. All of her sutures have been removed. On their initial visit to the wound healing center, 3/30/22  the patient has noted that the wound has been stable. The patient has not had similar previous wounds in the past.      Pt is not on abx at time of initial visit.     3/30/22  · Culture done  · aquacell packing  · Stop wound vac  · Screen for hbo  · Labs and cxr ordered   4/6/22  · Wound improved  4/13/22  · Improved  4/27/22  · Improved - at 7 oclcok - small area - pack  · Started hbo  · Some drainage from higher up towards axilla  5/4/22  · Wound improved  · Continue hbo  5/11/22  · Medial healed  · Lateral some drainage - small opening  · Continue hbo  5/18/22  · Lateral - small opening made slightly larger so packing could be placed  · Continue hbo  6/1/22  · Smaller, less drainage  · Continue hbo     Wound/Ulcer Pain Timing/Severity: none  Quality of pain: N/A  Severity:  0 / 10   Modifying Factors: None  Associated Signs/Symptoms: drainage    Ulcer Identification:  Ulcer Type: non-healing surgical, soft tissue radionecrosis, compromised skin graft/flap and necrosis radiation  Contributing Factors: edema and decreased tissue oxygenation    Diabetic/Pressure/Non Pressure Ulcers only:  Ulcer: Non-Pressure ulcer, fat layer exposed  Wound: Wound dehiscence        PAST MEDICAL HISTORY      Diagnosis Date    Cancer [facility]) 2008    right breast    Late effect of radiation 3/30/2022    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    Ulcer of skin of breast ([facility] 3/30/2022     Past Surgical History:   Procedure Laterality Date    BREAST BIOPSY Bilateral 7/6/2021    RIGHT MODIFIED RADICAL MASTECTOMY,  LEFT BREAST PROPHYLATIC MASTECTOMY performed by Brian Mulligan MD at [address] [place] [address]  BREAST LUMPECTOMY Right     CARPAL TUNNEL RELEASE Right     CHEST WALL RESECTION Right 2/8/2022    RIGHT CHEST WALL COMPLEX WOUND CLOSURE AND WOUND VAC performed by Bernett Mortimer, MD at [address] [place] [address]      PORT SURGERY Left 7/6/2021    LEFT SUBCLAVIAN MEDI PORT INSERTION performed by Bernett Mortimer, MD at [address] [place] [address]       Family History   Problem Relation Age of Onset    Cancer Mother         lung     Social History     Tobacco Use    Smoking status: Former Smoker     Packs/day: 0.50    Smokeless tobacco: Former User     Quit date: 3/9/2022    Tobacco comment: 1/2 pack or less    Vaping Use    Vaping Use: Never used   Substance Use Topics    Alcohol use: Yes     Comment: occasionaly    Drug use: Never     Allergies   Allergen Reactions    Meperidine     Morphine     Sulfa Antibiotics Nausea And Vomiting and Other (See Comments)     fever     Current Outpatient Medications on File Prior to Encounter   Medication Sig Dispense Refill    Multiple Vitamins-Minerals (THERAPEUTIC MULTIVITAMIN-MINERALS) tablet Take 1 tablet by mouth daily       BIOTIN PO Take by mouth       NONFORMULARY Inject into the muscle every 21 days Chemo cocktail:  Pertuzumab, Trastuzumab, Hyaluronidase  Goes to [facility] for the injection        No current facility-administered medications on file prior to encounter. REVIEW OF SYSTEMS See HPI    Objective: There were no vitals taken for this visit.   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)    Ulcer of skin of breast ([facility] - Primary (Chronic)    Relevant Orders    Initiate Outpatient Wound Care Protocol    Late effect of radiation (Chronic)    Relevant Orders    Initiate Outpatient Wound Care Protocol    Wound dehiscence    Relevant Orders    Initiate Outpatient Wound Care Protocol          Pre Debridement Measurements:  Are located in the [place]  Documentation Flow Sheet  Post Debridement Measurements:  Wound/Ulcer Descriptions are Pre Debridement except measurements:     Wound 03/30/22 Breast Right #1 (Active)   Wound Image   05/25/22 1452   Dressing Status New dressing applied 06/01/22 1117   Wound Cleansed Cleansed with saline 06/01/22 1117   Dressing/Treatment Alginate;Dry dressing 06/01/22 1117   Offloading for Diabetic Foot Ulcers Offloading not required 06/01/22 1117   Wound Length (cm) 0.1 cm 06/01/22 1025   Wound Width (cm) 0.1 cm 06/01/22 1025   Wound Depth (cm) 0.1 cm 06/01/22 1025   Wound Surface Area (cm^2) 0.01 cm^2 06/01/22 1025   Change in Wound Size % (l*w) 98.46 06/01/22 1025   Wound Volume (cm^3) 0.001 cm^3 06/01/22 1025   Wound Healing % 99 06/01/22 1025   Post-Procedure Length (cm) 0.1 cm 05/25/22 1502   Post-Procedure Width (cm) 0.1 cm 05/25/22 1502   Post-Procedure Depth (cm) 0.1 cm 05/25/22 1502   Post-Procedure Surface Area (cm^2) 0.01 cm^2 05/25/22 1502   Post-Procedure Volume (cm^3) 0.001 cm^3 05/25/22 1502   Undermining Starts ___ O'Clock 3 04/06/22 1402   Undermining Ends___ O'Clock 8 04/06/22 1402   Undermining Maxium Distance (cm) 0.5 @ 7 04/06/22 1402   Wound Assessment Fibrin 06/01/22 1025   Drainage Amount Scant 06/01/22 1025   Drainage Description Yellow 06/01/22 1025   Odor None 06/01/22 1025   Ling-wound Assessment Intact 06/01/22 1025   Wound Thickness Description not for Pressure Injury Full thickness 05/18/22 1328   Number of days: 62     Incision 07/06/21 Breast Right;Medial (Active)   Number of days: 329       Incision 02/08/22 Chest Right (Active)   Number of days: 112       No debridement    Plan:   Treatment Note please see attached Discharge Instructions    Written patient dismissal instructions given to patient and signed by patient or POA. Discharge Instructions       Visit Discharge/Physician Orders     Discharge condition: Stable     Assessment of pain at discharge: mild     Anesthetic used: lido 4%     Discharge to: Home     Left via:Private automobile     Accompanied by:  self     ECF/HHA: [facility]      Dressing Orders: To right lateral breast wound: Cleanse with normal saline, pack loosely with aquacel rope in upper right corner, cover and secure with dry dressing. Change daily. Remove aquacel dry, do not wet to remove.      Treatment Orders: Eat a diet high in protein and vitamin C. Take a multiple vitamin daily unless contraindicated.     Do not get wet.       Continue HBO     [facility] followup visit  1 week _________________________  (Please note your next appointment above and if you are unable to keep, kindly give a 24 hour notice.  Thank you.)     Physician signature:__________________________        If you experience any of the following, please call the [facility] during business hours:     * Increase in Pain  * Temperature over 101  * Increase in drainage from your wound  * Drainage with a foul odor  * Bleeding  * Increase in swelling  * Need for compression bandage changes due to slippage, breakthrough drainage.     If you need medical attention outside of the business hours of the [facility] please contact your PCP or go to the nearest emergency room.                   Electronically signed by Olman Schmitz MD

## 2022-06-02 ENCOUNTER — HOSPITAL ENCOUNTER (OUTPATIENT)
Dept: HYPERBARIC MEDICINE | Age: 52
Discharge: HOME | End: 2022-06-02
Payer: COMMERCIAL

## 2022-06-02 VITALS
RESPIRATION RATE: 16 BRPM | TEMPERATURE: 97.1 F | SYSTOLIC BLOOD PRESSURE: 124 MMHG | DIASTOLIC BLOOD PRESSURE: 68 MMHG | HEART RATE: 72 BPM

## 2022-06-02 DIAGNOSIS — T66.XXXS LATE EFFECT OF RADIATION: Primary | ICD-10-CM

## 2022-06-02 PROCEDURE — 99183 HYPERBARIC OXYGEN THERAPY: CPT | Performed by: SURGERY

## 2022-06-02 PROCEDURE — G0277 HBOT, FULL BODY CHAMBER, 30M: HCPCS

## 2022-06-02 NOTE — PROGRESS NOTES
Oxygen Therapy provided. Continue HBO treatment as outlined in the treatment plan. Hyperbaric Oxygen: Ijeoma Rae tolerated Treatment Number: 30 well today without complications. Discharge Instructions were explained and given to Ms. Rae     Electronically signed by Barrett Kahn MD on 6/1/2022 at 10:29 AM

## 2022-06-03 ENCOUNTER — HOSPITAL ENCOUNTER (OUTPATIENT)
Dept: HYPERBARIC MEDICINE | Age: 52
Discharge: HOME | End: 2022-06-03
Payer: COMMERCIAL

## 2022-06-03 VITALS
TEMPERATURE: 96.7 F | SYSTOLIC BLOOD PRESSURE: 142 MMHG | HEART RATE: 76 BPM | RESPIRATION RATE: 18 BRPM | DIASTOLIC BLOOD PRESSURE: 70 MMHG

## 2022-06-03 DIAGNOSIS — T66.XXXS LATE EFFECT OF RADIATION: Primary | ICD-10-CM

## 2022-06-03 PROCEDURE — G0277 HBOT, FULL BODY CHAMBER, 30M: HCPCS

## 2022-06-03 PROCEDURE — 99183 HYPERBARIC OXYGEN THERAPY: CPT | Performed by: SURGERY

## 2022-06-06 ENCOUNTER — HOSPITAL ENCOUNTER (OUTPATIENT)
Dept: HYPERBARIC MEDICINE | Age: 52
Discharge: HOME | End: 2022-06-06
Payer: COMMERCIAL

## 2022-06-06 VITALS
TEMPERATURE: 97 F | DIASTOLIC BLOOD PRESSURE: 78 MMHG | SYSTOLIC BLOOD PRESSURE: 144 MMHG | HEART RATE: 76 BPM | RESPIRATION RATE: 16 BRPM

## 2022-06-06 DIAGNOSIS — L59.8 RADIATION NECROSIS OF SKIN AND SUBCUTANEOUS: Chronic | ICD-10-CM
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
## 2022-06-06 DIAGNOSIS — T66.XXXS LATE EFFECT OF RADIATION: Primary | ICD-10-CM

## 2022-06-06 PROCEDURE — 99183 HYPERBARIC OXYGEN THERAPY: CPT | Performed by: SURGERY

## 2022-06-06 PROCEDURE — G0277 HBOT, FULL BODY CHAMBER, 30M: HCPCS

## 2022-06-07 ENCOUNTER — HOSPITAL ENCOUNTER (OUTPATIENT)
Dept: HYPERBARIC MEDICINE | Age: 52
Discharge: HOME | End: 2022-06-07
Payer: COMMERCIAL

## 2022-06-07 VITALS
SYSTOLIC BLOOD PRESSURE: 134 MMHG | TEMPERATURE: 97.4 F | HEART RATE: 84 BPM | DIASTOLIC BLOOD PRESSURE: 84 MMHG | RESPIRATION RATE: 16 BRPM

## 2022-06-07 DIAGNOSIS — T66.XXXS LATE EFFECT OF RADIATION: Primary | ICD-10-CM
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
## 2022-06-07 DIAGNOSIS — L59.8 RADIATION NECROSIS OF SKIN AND SUBCUTANEOUS: Chronic | ICD-10-CM

## 2022-06-07 PROCEDURE — 99183 HYPERBARIC OXYGEN THERAPY: CPT | Performed by: SURGERY

## 2022-06-07 PROCEDURE — G0277 HBOT, FULL BODY CHAMBER, 30M: HCPCS

## 2022-06-07 NOTE — PROGRESS NOTES
[facility]  Hyperbaric Oxygen Therapy   Progress Note    NAME: Sheryle Sera COOK [facility]  MEDICAL RECORD NUMBER:  [id number]  AGE: 54 y.o. 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 See doc flowsheets for documentation. Objective       No results for input(s): GLUMET in the last 72 hours. Pre treatment Vital Signs       Temp: 97.3 °F (36.3 °C)     Heart Rate: 72     Resp: 18     BP: (!) 140/68     Post treatment Vital Signs  Temp: (!) 96.7 °F (35.9 °C)  Heart Rate: 76  Resp: 18  BP: (!) 142/70  Assessment      Physical Exam:  General Appearance:  alert and oriented to person, place and time, well-developed and well-nourished, in no acute distress  ENT:  tympanic membranes intact bilaterally, normal color, normal light reflex bilaterally  Pulmonary/Chest:  clear to auscultation bilaterally- no wheezes, rales or rhonchi, normal air movement, no respiratory distress  Cardiovascular:  regular rate and rhythm  Chamber #: 38  Treatment Start Time: 0721     Pressure Reached Time: 0738  FADI : 2.5  Number of Air Breaks: 2  Treatment Status: Air break X 2 for 5 minutes each     Decompression Time: 6063   Treatment End Time: 0933  Length of Treatment: 90 Minutes  Symptoms Noted During Treatment: None  Total Treatment Time (min): 132    Adverse Event: no    I was present on these premises and immediately available to furnish assistance & direction throughout the procedure. Laura      Claribel Doyle is a 54 y.o. female  did successfully complete today's hyperbaric oxygen treatment at [address] [place] [address].     In my clinical judgement, ongoing HBO therapy is necessary at this time, given a threat to patient function, limb or life from the current condition. Supervision and attendance of Hyperbaric Oxygen Therapy provided. Continue HBO treatment as outlined in the treatment plan. Hyperbaric Oxygen: Ramakrishna Rae tolerated Treatment Number: 28 well today without complications. Discharge Instructions were explained and given to Ms. Rae by HBOT staff    Electronically signed by Laurel Guillermo MD

## 2022-06-07 NOTE — PROGRESS NOTES
[facility]  Hyperbaric Oxygen Therapy   Progress Note    NAME: Cristel HOYOS [facility]  MEDICAL RECORD NUMBER:  [id number]  AGE: 54 y.o. 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 See doc flowsheets for documentation. Objective       No results for input(s): GLUMET in the last 72 hours. Pre treatment Vital Signs       Temp: 97.3 °F (36.3 °C)     Heart Rate: 72     Resp: 16     BP: 110/60     Post treatment Vital Signs  Temp: 96.3 °F (35.7 °C)  Heart Rate: 72  Resp: 16  BP: 110/62  Assessment      Physical Exam:  General Appearance:  alert and oriented to person, place and time, well-developed and well-nourished, in no acute distress  ENT:  tympanic membranes intact bilaterally, normal color, normal light reflex bilaterally  Pulmonary/Chest:  clear to auscultation bilaterally- no wheezes, rales or rhonchi, normal air movement, no respiratory distress  Cardiovascular:  regular rate and rhythm  Chamber #: 38  Treatment Start Time: 1158     Pressure Reached Time: 1215  FADI : 2.5  Number of Air Breaks: 2  Treatment Status: Air break X 2 for 5 minutes each     Decompression Time: 1356   Treatment End Time: 1412  Length of Treatment: 90 Minutes  Symptoms Noted During Treatment: None  Total Treatment Time (min): 134    Adverse Event: no    I was present on these premises and immediately available to furnish assistance & direction throughout the procedure. Laura Munguia Avis is a 54 y.o. female  did successfully complete today's hyperbaric oxygen treatment at [address] [place] [address].     In my clinical judgement, ongoing HBO therapy is necessary at this time, given a threat to patient function, limb or life from the current condition. Supervision and attendance of Hyperbaric Oxygen Therapy provided. Continue HBO treatment as outlined in the treatment plan. Hyperbaric Oxygen: Seun Rae tolerated Treatment Number: 24 well today without complications. Discharge Instructions were explained and given to Ms. Rae by HBOT staff    Electronically signed by Gregorio Delvalle MD

## 2022-06-07 NOTE — PROGRESS NOTES
current condition. Supervision and attendance of Hyperbaric Oxygen Therapy provided. Continue HBO treatment as outlined in the treatment plan. Hyperbaric Oxygen: Loreta Rae tolerated Treatment Number: 37 well today without complications. Discharge Instructions were explained and given to Ms. Rae by HBOT staff    Electronically signed by Kalpana Hall MD

## 2022-06-07 NOTE — PROGRESS NOTES
[facility]  Hyperbaric Oxygen Therapy   Progress Note    NAME: Rei Cuellar [facility]  MEDICAL RECORD NUMBER:  [id number]  AGE: 54 y.o. 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 See doc flowsheets for documentation. Objective       No results for input(s): GLUMET in the last 72 hours. Pre treatment Vital Signs       Temp: 97 °F (36.1 °C)     Heart Rate: 80     Resp: 16     BP: 124/62     Post treatment Vital Signs  Temp: 97 °F (36.1 °C)  Heart Rate: 76  Resp: 16  BP: (!) 144/78  Assessment      Physical Exam:  General Appearance:  alert and oriented to person, place and time, well-developed and well-nourished, in no acute distress  ENT:  tympanic membranes intact bilaterally  Pulmonary/Chest:  clear to auscultation bilaterally- no wheezes, rales or rhonchi, normal air movement, no respiratory distress  Cardiovascular:  regular rate and rhythm  Chamber #: 38  Treatment Start Time: 07     Pressure Reached Time: 0744  FADI : 2.5  Number of Air Breaks:  Treatment Status: Air break X 2 for 5 minutes each     Decompression Time: 6319   Treatment End Time: 0942  Length of Treatment: 90 Minutes  Symptoms Noted During Treatment: None  Total Treatment Time (min): 133    Adverse Event: no    I was present on these premises and immediately available to furnish assistance & direction throughout the procedure. Plan      Rachel Machado is a 54 y.o. female  did successfully complete today's hyperbaric oxygen treatment at [address] [place] [address].     In my clinical judgement, ongoing HBO therapy is  necessary at this time, given a threat to patient function, limb or life from the current condition. Supervision and attendance of Hyperbaric Oxygen Therapy provided. Continue HBO treatment as outlined in the treatment plan. Hyperbaric Oxygen: Narendra Davian Rae tolerated Treatment Number: 35 well today without complications. Discharge Instructions were explained and given to Ms. Rae     Electronically signed by Domo Garcia MD on 6/6/2022 at 9:37 PM

## 2022-06-08 ENCOUNTER — HOSPITAL ENCOUNTER (OUTPATIENT)
Dept: HYPERBARIC MEDICINE | Age: 52
Discharge: HOME | End: 2022-06-08
Payer: COMMERCIAL

## 2022-06-08 ENCOUNTER — HOSPITAL ENCOUNTER (OUTPATIENT)
Dept: WOUND CARE | Age: 52
Discharge: HOME | End: 2022-06-08
Payer: COMMERCIAL

## 2022-06-08 VITALS — BODY MASS INDEX: 30.44 KG/M2 | HEIGHT: 58 IN | WEIGHT: 145 LBS

## 2022-06-08 VITALS
SYSTOLIC BLOOD PRESSURE: 112 MMHG | TEMPERATURE: 97.7 F | DIASTOLIC BLOOD PRESSURE: 74 MMHG | RESPIRATION RATE: 16 BRPM | HEART RATE: 85 BPM

## 2022-06-08 DIAGNOSIS — T66.XXXS LATE EFFECT OF RADIATION: ICD-10-CM
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
## 2022-06-08 DIAGNOSIS — T66.XXXS LATE EFFECT OF RADIATION: Primary | ICD-10-CM

## 2022-06-08 DIAGNOSIS — L59.8 RADIATION NECROSIS OF SKIN AND SUBCUTANEOUS: Chronic | ICD-10-CM

## 2022-06-08 DIAGNOSIS — L98.499 ULCER OF SKIN OF BREAST (HCC): Primary | ICD-10-CM

## 2022-06-08 DIAGNOSIS — T81.30XA WOUND DEHISCENCE: ICD-10-CM

## 2022-06-08 PROCEDURE — 99183 HYPERBARIC OXYGEN THERAPY: CPT | Performed by: SURGERY

## 2022-06-08 PROCEDURE — G0277 HBOT, FULL BODY CHAMBER, 30M: HCPCS

## 2022-06-08 PROCEDURE — 99211 OFF/OP EST MAY X REQ PHY/QHP: CPT

## 2022-06-08 PROCEDURE — 99213 OFFICE O/P EST LOW 20 MIN: CPT | Performed by: SURGERY

## 2022-06-08 RX ORDER — LIDOCAINE 50 MG/G
OINTMENT TOPICAL ONCE
Status: CANCELLED | OUTPATIENT
Start: 2022-06-08 | End: 2022-06-08

## 2022-06-08 RX ORDER — LIDOCAINE HYDROCHLORIDE 40 MG/ML
SOLUTION TOPICAL ONCE
Status: CANCELLED | OUTPATIENT
Start: 2022-06-08 | End: 2022-06-08

## 2022-06-08 RX ORDER — LIDOCAINE HYDROCHLORIDE 20 MG/ML
JELLY TOPICAL ONCE
Status: CANCELLED | OUTPATIENT
Start: 2022-06-08 | End: 2022-06-08

## 2022-06-08 RX ORDER — BETAMETHASONE DIPROPIONATE 0.05 %
OINTMENT (GRAM) TOPICAL ONCE
Status: CANCELLED | OUTPATIENT
Start: 2022-06-08 | End: 2022-06-08

## 2022-06-08 RX ORDER — LIDOCAINE 40 MG/G
CREAM TOPICAL ONCE
Status: CANCELLED | OUTPATIENT
Start: 2022-06-08 | End: 2022-06-08

## 2022-06-08 RX ORDER — BACITRACIN, NEOMYCIN, POLYMYXIN B 400; 3.5; 5 [USP'U]/G; MG/G; [USP'U]/G
OINTMENT TOPICAL ONCE
Status: CANCELLED | OUTPATIENT
Start: 2022-06-08 | End: 2022-06-08

## 2022-06-08 RX ORDER — CLOBETASOL PROPIONATE 0.5 MG/G
OINTMENT TOPICAL ONCE
Status: CANCELLED | OUTPATIENT
Start: 2022-06-08 | End: 2022-06-08

## 2022-06-08 RX ORDER — BACITRACIN ZINC AND POLYMYXIN B SULFATE 500; 1000 [USP'U]/G; [USP'U]/G
OINTMENT TOPICAL ONCE
Status: CANCELLED | OUTPATIENT
Start: 2022-06-08 | End: 2022-06-08

## 2022-06-08 RX ORDER — GENTAMICIN SULFATE 1 MG/G
OINTMENT TOPICAL ONCE
Status: CANCELLED | OUTPATIENT
Start: 2022-06-08 | End: 2022-06-08

## 2022-06-08 RX ORDER — GINSENG 100 MG
CAPSULE ORAL ONCE
Status: CANCELLED | OUTPATIENT
Start: 2022-06-08 | End: 2022-06-08

## 2022-06-08 NOTE — PROGRESS NOTES
[facility]  Hyperbaric Oxygen Therapy   Progress Note    NAME: Timo HOYOS [facility]  MEDICAL RECORD NUMBER:  [id number]  AGE: 54 y.o. 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 See doc flowsheets for documentation. Objective       No results for input(s): GLUMET in the last 72 hours. Pre treatment Vital Signs       Temp: 97 °F (36.1 °C)     Heart Rate: 90     Resp: 16     BP: 130/64     Post treatment Vital Signs  Temp: 97.4 °F (36.3 °C)  Heart Rate: 84  Resp: 16  BP: 134/84  Assessment      Physical Exam:  General Appearance:  alert and oriented to person, place and time, well-developed and well-nourished, in no acute distress  ENT:  tympanic membranes intact bilaterally  Pulmonary/Chest:  clear to auscultation bilaterally- no wheezes, rales or rhonchi, normal air movement, no respiratory distress  Cardiovascular:  normal  Chamber #: 39  Treatment Start Time: 0725     Pressure Reached Time: 0740  FADI : 2.5  Number of Air Breaks:  Treatment Status: Back to oxygen     Decompression Time: 923   Treatment End Time: 5668  Length of Treatment: 90 Minutes  Symptoms Noted During Treatment: None  Total Treatment Time (min): 133    Adverse Event: no    I was present on these premises and immediately available to furnish assistance & direction throughout the procedure. Laura      Richa Guillory is a 54 y.o. female  did successfully complete today's hyperbaric oxygen treatment at [address] [place] [address]. In my clinical judgement, ongoing HBO therapy is  necessary at this time, given a threat to patient function, limb or life from the current condition.       Supervision and attendance of Hyperbaric Oxygen Therapy provided. Continue HBO treatment as outlined in the treatment plan. Hyperbaric Oxygen: Ariella Jamesblanche Kyra tolerated Treatment Number: 29 well today without complications. Discharge Instructions were explained and given to Ms. Rae     Electronically signed by Pastor Pickett MD on 6/7/2022 at 6:50 AM

## 2022-06-08 NOTE — PROGRESS NOTES
Wound Healing Center Followup Visit Note    Referring Physician : Kelle Schaumann, MD Abdul Lineman [facility]  MEDICAL RECORD NUMBER:  [id number]  AGE: 54 y.o. GENDER: female  : 1970  EPISODE DATE:  2022    Subjective:     Chief Complaint   Patient presents with    Wound Check     right breast      HISTORY of PRESENT ILLNESS HPI   Major Ro is a 54 y.o. female who presents today in regards to follow up evaluation and treatment of wound/ulcer. That patient's past medical, family and social hx were reviewed and changes were made if present. History of Wound Context:  Patient has had a wound of her right chest since 2022. She had her mastectomy wound dehisce. She had her first breast cancer at age 30.  She had undergone a lumpectomy with reexcision for margins and radiation at that time first at [facility] and then up at [facility]. Art Hinojosa was estrogen progesterone positive and HER-2 negative and was placed on tamoxifen then for 5 years. She had a recurrence in . It was estrogen progesterone negative and HER-2 positive.  She underwent an MRI of her breast and the tumor was only 1.6 cm in size.  Due to the recurrence she also underwent CAT scans and bone scans which were negative.  Due to the tumor being less than 2 cm in size we took her to the OR and she elected for bilateral mastectomy.  [place] lymph node was attempted but it turned into a right modified radical mastectomy. Marisol Bauer final pathology showed that she had multiple lymph nodes that were positive.  So she went into surgery and a stage Ia tumor and came out of surgery as a stage IIIa. She underwent PET scan in October which showed postoperative change.      At her second postoperative appointment she had a lot of drainage through the wound despite the FABIO drain that had been left.  And she dehisced in the middle to lateral aspect of the wound.  She was treated with wet-to-dry dressings.  When she got off her chemotherapy and onto immunotherapy she went back to surgery in 2/2022 for debridement and closure.  The wound was able to be closed but it had a little bit of tension medially.  She developed a seroma.      She has had a wound vac since ~ 2/2022. All of her sutures have been removed. On their initial visit to the wound healing center, 3/30/22  the patient has noted that the wound has been stable. The patient has not had similar previous wounds in the past.      Pt is not on abx at time of initial visit.     3/30/22  · Culture done  · aquacell packing  · Stop wound vac  · Screen for hbo  · Labs and cxr ordered   4/6/22  · Wound improved  4/13/22  · Improved  4/27/22  · Improved - at 7 oclcok - small area - pack  · Started hbo  · Some drainage from higher up towards axilla  5/4/22  · Wound improved  · Continue hbo  5/11/22  · Medial healed  · Lateral some drainage - small opening  · Continue hbo  5/18/22  · Lateral - small opening made slightly larger so packing could be placed  · Continue hbo  6/1/22  · Smaller, less drainage  · Continue hbo   6/8/22  · No drainage  · Wound appears healed  · Stop hbo  · Call if needed    Wound/Ulcer Pain Timing/Severity: none  Quality of pain: N/A  Severity:  0 / 10   Modifying Factors: None  Associated Signs/Symptoms: drainage    Ulcer Identification:  Ulcer Type: non-healing surgical, soft tissue radionecrosis, compromised skin graft/flap and necrosis radiation  Contributing Factors: edema and decreased tissue oxygenation    Diabetic/Pressure/Non Pressure Ulcers only:  Ulcer: Non-Pressure ulcer, fat layer exposed  Wound: Wound dehiscence        PAST MEDICAL HISTORY      Diagnosis Date    Cancer [facility]) 2008    right breast    Late effect of radiation 3/30/2022    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    Ulcer of skin of breast ([facility] [address] 3/30/2022     Past Surgical History:   Procedure Laterality Date    BREAST BIOPSY Bilateral 7/6/2021    RIGHT MODIFIED RADICAL MASTECTOMY, absent    Assessment:     Problem List Items Addressed This Visit     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)    Ulcer of skin of breast ([facility] [address] - Primary (Chronic)    Relevant Orders    Initiate Outpatient Wound Care Protocol    Late effect of radiation (Chronic)    Relevant Orders    Initiate Outpatient Wound Care Protocol    Wound dehiscence    Relevant Orders    Initiate Outpatient Wound Care Protocol          Pre Debridement Measurements:  Are located in the [facility]  Documentation Flow Sheet  Post Debridement Measurements:  Wound/Ulcer Descriptions are Pre Debridement except measurements:     Wound 03/30/22 Breast Right #1 (Active)   Wound Image   06/08/22 1024   Dressing Status New dressing applied 06/01/22 1117   Wound Cleansed Cleansed with saline 06/01/22 1117   Dressing/Treatment Alginate;Dry dressing 06/01/22 1117   Offloading for Diabetic Foot Ulcers Offloading not required 06/01/22 1117   Wound Length (cm) 0 cm 06/08/22 1024   Wound Width (cm) 0 cm 06/08/22 1024   Wound Depth (cm) 0 cm 06/08/22 1024   Wound Surface Area (cm^2) 0 cm^2 06/08/22 1024   Change in Wound Size % (l*w) 100 06/08/22 1024   Wound Volume (cm^3) 0 cm^3 06/08/22 1024   Wound Healing % 100 06/08/22 1024   Post-Procedure Length (cm) 0 cm 06/08/22 1116   Post-Procedure Width (cm) 0 cm 06/08/22 1116   Post-Procedure Depth (cm) 0 cm 06/08/22 1116   Post-Procedure Surface Area (cm^2) 0 cm^2 06/08/22 1116   Post-Procedure Volume (cm^3) 0 cm^3 06/08/22 1116   Undermining Starts ___ O'Clock 3 04/06/22 1402   Undermining Ends___ O'Clock 8 04/06/22 1402   Undermining Maxium Distance (cm) 0.5 @ 7 04/06/22 1402   Wound Assessment Dry;Fibrin 06/08/22 1024   Drainage Amount None 06/08/22 1024   Drainage Description Yellow 06/01/22 1025   Odor None 06/01/22 1025   Ling-wound Assessment Intact 06/01/22 1025   Wound Thickness Description not for Pressure Injury Full thickness 05/18/22 1328   Number of days: 69     Incision 07/06/21 Breast Right;Medial (Active)   Number of days: 336       Incision 02/08/22 Chest Right (Active)   Number of days: 119       No debridement    Plan:   Treatment Note please see attached Discharge Instructions    Written patient dismissal instructions given to patient and signed by patient or POA. Discharge Instructions       Visit Discharge/Physician Orders     Discharge condition: Stable     Assessment of pain at discharge: mild     Anesthetic used: none     Discharge to: Home     Left via:Private automobile     Accompanied by:  self     ECF/HHA: [facility]      Dressing Orders: To right lateral breast wound: healed- keep area clean and dry.     Treatment Orders: Eat a diet high in protein and vitamin C. Take a multiple vitamin daily unless contraindicated.      HBO-completed     WCC followup visit : none-HEALED_________________________  (Please note your next appointment above and if you are unable to keep, kindly give a 24 hour notice.  Thank you.)     Physician signature:__________________________        If you experience any of the following, please call the [facility] during business hours:     * Increase in Pain  * Temperature over 101  * Increase in drainage from your wound  * Drainage with a foul odor  * Bleeding  * Increase in swelling  * Need for compression bandage changes due to slippage, breakthrough drainage.     If you need medical attention outside of the business hours of the [facility] please contact your PCP or go to the nearest emergency room.                                Electronically signed by Cynthia Lainez MD

## 2022-06-17 ENCOUNTER — OFFICE VISIT (OUTPATIENT)
Dept: BREAST CENTER | Age: 52
End: 2022-06-17
Payer: COMMERCIAL

## 2022-06-17 VITALS
RESPIRATION RATE: 17 BRPM | BODY MASS INDEX: 30.8 KG/M2 | TEMPERATURE: 97.2 F | HEART RATE: 79 BPM | OXYGEN SATURATION: 99 % | WEIGHT: 152.8 LBS | SYSTOLIC BLOOD PRESSURE: 120 MMHG | DIASTOLIC BLOOD PRESSURE: 80 MMHG | HEIGHT: 59 IN

## 2022-06-17 DIAGNOSIS — C50.111 MALIGNANT NEOPLASM OF CENTRAL PORTION OF RIGHT BREAST IN FEMALE, ESTROGEN RECEPTOR NEGATIVE (HCC): Primary | ICD-10-CM

## 2022-06-17 DIAGNOSIS — Z17.1 MALIGNANT NEOPLASM OF CENTRAL PORTION OF RIGHT BREAST IN FEMALE, ESTROGEN RECEPTOR NEGATIVE (HCC): Primary | ICD-10-CM

## 2022-06-17 PROCEDURE — 99212 OFFICE O/P EST SF 10 MIN: CPT | Performed by: SURGERY

## 2022-06-17 PROCEDURE — G8417 CALC BMI ABV UP PARAM F/U: HCPCS | Performed by: SURGERY

## 2022-06-17 PROCEDURE — G8427 DOCREV CUR MEDS BY ELIG CLIN: HCPCS | Performed by: SURGERY

## 2022-06-17 PROCEDURE — 1036F TOBACCO NON-USER: CPT | Performed by: SURGERY

## 2022-06-17 ASSESSMENT — ENCOUNTER SYMPTOMS
RESPIRATORY NEGATIVE: 1
BLOOD IN STOOL: 0
ABDOMINAL DISTENTION: 0
ABDOMINAL PAIN: 0
VOMITING: 0
DIARRHEA: 0
ANAL BLEEDING: 0
SHORTNESS OF BREATH: 0
GASTROINTESTINAL NEGATIVE: 1
NAUSEA: 0
COUGH: 0
BACK PAIN: 0
EYES NEGATIVE: 1
ALLERGIC/IMMUNOLOGIC NEGATIVE: 1
CONSTIPATION: 0

## 2022-06-17 NOTE — PROGRESS NOTES
[facility]  PROGRESS NOTE  ATTENDING NOTE    Chief Complaint   Patient presents with    Wound Check     wound check - personal history breast cancer     S: 54 female with history of recurrent right breast cancer. She had a wound dehiscence was unsuccessfully revised. She is completed now 35 treatments of hyperbaric oxygen. It has healed. She denies any other new symptoms. Review of Systems   Constitutional: Negative. Negative for activity change, appetite change and unexpected weight change. HENT: Negative. Eyes: Negative. Respiratory: Negative. Negative for cough and shortness of breath. Cardiovascular: Negative. Negative for chest pain and leg swelling. Gastrointestinal: Negative. Negative for abdominal distention, abdominal pain, anal bleeding, blood in stool, constipation, diarrhea, nausea and vomiting. Endocrine: Negative. Genitourinary: Negative. Musculoskeletal: Negative for arthralgias, back pain, gait problem, joint swelling and myalgias. Muscle twitching   Skin: Negative. Allergic/Immunologic: Negative. Neurological: Negative. Negative for dizziness, weakness and headaches. Hematological: Negative. Psychiatric/Behavioral: Negative. Negative for confusion, decreased concentration and sleep disturbance. /80 (Site: Right Upper Arm, Position: Sitting, Cuff Size: Medium Adult)   Pulse 79   Temp 97.2 °F (36.2 °C)   Resp 17   Ht 4' 10.5\" (1.486 m)   Wt 152 lb 12.8 oz (69.3 kg)   SpO2 99%   BMI 31.39 kg/m²   Physical Exam  Constitutional:       Appearance: Normal appearance. HENT:      Head: Normocephalic and atraumatic. Nose: Nose normal.      Mouth/Throat:      Mouth: Mucous membranes are moist.      Pharynx: Oropharynx is clear. Eyes:      Extraocular Movements: Extraocular movements intact. Pupils: Pupils are equal, round, and reactive to light.    Cardiovascular:      Rate and Rhythm: Normal rate and regular rhythm. Pulses: Normal pulses. Heart sounds: Normal heart sounds. Pulmonary:      Effort: Pulmonary effort is normal.      Breath sounds: Normal breath sounds. Chest:   Breasts:      Right: Absent. No axillary adenopathy or supraclavicular adenopathy. Left: Absent. No axillary adenopathy or supraclavicular adenopathy. Abdominal:      General: There is no distension. Palpations: Abdomen is soft. Tenderness: There is no abdominal tenderness. Musculoskeletal:         General: No tenderness or signs of injury. Cervical back: Normal range of motion and neck supple. Lymphadenopathy:      Cervical: No cervical adenopathy. Right cervical: No superficial cervical adenopathy. Left cervical: No superficial cervical adenopathy. Upper Body:      Right upper body: No supraclavicular or axillary adenopathy. Left upper body: No supraclavicular or axillary adenopathy. Skin:     General: Skin is warm and dry. Neurological:      General: No focal deficit present. Mental Status: She is alert and oriented to person, place, and time. Psychiatric:         Mood and Affect: Mood normal.         Behavior: Behavior normal.         Thought Content: Thought content normal.         Judgment: Judgment normal.         ASSESSMENT/PLAN:  Stage IIIA recurrent right breast cancer--s/p bilateral mastectomy  Wound dehiscence--s/p HBO therapy  -- Continue monthly self examination of the chest wall and axilla.   I advised patient to bring any changes to our attention  --Maintain a healthy weight with diet and exercise  --Avoid any IVs, venipuncture, blood pressures to right upper extremity  -- Continue to follow-up with oncology    Return to clinic 6 months    Lizbeth Haskins MD, MSc, FACS  6/17/2022  2:51 PM

## 2022-06-29 NOTE — PROGRESS NOTES
[facility]  Hyperbaric Oxygen Therapy   Progress Note    NAME: Paige HOYOS [facility]  MEDICAL RECORD NUMBER:  [id number]  AGE: 50 y.o. 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 See doc flowsheets for documentation. Objective       No results for input(s): GLUMET in the last 72 hours. Pre treatment Vital Signs       Temp: 97 °F (36.1 °C)     Heart Rate: 76     Resp: 16     BP: 126/72     Post treatment Vital Signs  Temp: 97.7 °F (36.5 °C)  Heart Rate: 85  Resp: 16  BP: 112/74  Assessment      Physical Exam:  General Appearance:  alert and oriented to person, place and time, well-developed and well-nourished, in no acute distress  ENT:  tympanic membranes intact bilaterally  Pulmonary/Chest:  clear to auscultation bilaterally- no wheezes, rales or rhonchi, normal air movement, no respiratory distress  Cardiovascular:  regular rate and rhythm  Chamber #: 39  Treatment Start Time: 0740     Pressure Reached Time: 0756  FADI : 2.5  Number of Air Breaks:  Treatment Status: Back to oxygen     Decompression Time: 6151   Treatment End Time: 0956  Length of Treatment: 90 Minutes  Symptoms Noted During Treatment: None  Total Treatment Time (min): 136    Adverse Event: no    I was present on these premises and immediately available to furnish assistance & direction throughout the procedure. Laura Cash is a 50 y.o. female  did successfully complete today's hyperbaric oxygen treatment at [address] [place] [address]. In my clinical judgement, ongoing HBO therapy is  necessary at this time, given a threat to patient function, limb or life from the current condition.       Supervision and attendance of Hyperbaric Oxygen Therapy provided. Continue HBO treatment as outlined in the treatment plan. Hyperbaric Oxygen: Ramakrishna Rae tolerated Treatment Number: 28 well today without complications. Discharge Instructions were explained and given to Ms. Rae     Electronically signed by Po Thomason MD on 6/8/2022 at 10:19 PM

## 2022-08-09 ENCOUNTER — HOSPITAL ENCOUNTER (OUTPATIENT)
Dept: ULTRASOUND IMAGING | Age: 52
Discharge: HOME | End: 2022-08-11
Payer: COMMERCIAL

## 2022-08-09 DIAGNOSIS — R94.5 ABNORMAL FINDING ON LIVER FUNCTION: ICD-10-CM

## 2022-08-09 PROCEDURE — 76705 ECHO EXAM OF ABDOMEN: CPT

## 2022-09-06 ENCOUNTER — HOSPITAL ENCOUNTER (INPATIENT)
Age: 52
LOS: 2 days | Discharge: HOME | DRG: 683 | End: 2022-09-08
Attending: EMERGENCY MEDICINE | Admitting: FAMILY MEDICINE
Payer: COMMERCIAL

## 2022-09-06 DIAGNOSIS — E87.1 HYPONATREMIA: ICD-10-CM

## 2022-09-06 DIAGNOSIS — E87.6 HYPOKALEMIA: ICD-10-CM

## 2022-09-06 DIAGNOSIS — E86.0 DEHYDRATION: ICD-10-CM

## 2022-09-06 DIAGNOSIS — N17.9 ACUTE RENAL FAILURE, UNSPECIFIED ACUTE RENAL FAILURE TYPE (HCC): Primary | ICD-10-CM

## 2022-09-06 LAB
ALBUMIN SERPL-MCNC: 5 G/DL (ref 3.5–5.2)
ALP BLD-CCNC: 125 U/L (ref 35–104)
ALT SERPL-CCNC: 72 U/L (ref 0–32)
ANION GAP SERPL CALCULATED.3IONS-SCNC: 21 MMOL/L (ref 7–16)
AST SERPL-CCNC: 59 U/L (ref 0–31)
BACTERIA: ABNORMAL /HPF
BASOPHILS ABSOLUTE: 0.04 E9/L (ref 0–0.2)
BASOPHILS RELATIVE PERCENT: 0.3 % (ref 0–2)
BILIRUB SERPL-MCNC: 0.4 MG/DL (ref 0–1.2)
BILIRUBIN DIRECT: <0.2 MG/DL (ref 0–0.3)
BILIRUBIN URINE: ABNORMAL
BILIRUBIN, INDIRECT: ABNORMAL MG/DL (ref 0–1)
BLOOD, URINE: ABNORMAL
BUN BLDV-MCNC: 66 MG/DL (ref 6–20)
CALCIUM SERPL-MCNC: 9.4 MG/DL (ref 8.6–10.2)
CHLORIDE BLD-SCNC: 91 MMOL/L (ref 98–107)
CLARITY: CLEAR
CO2: 17 MMOL/L (ref 22–29)
COLOR: YELLOW
CREAT SERPL-MCNC: 2.9 MG/DL (ref 0.5–1)
CREATININE URINE: 142 MG/DL (ref 29–226)
EOSINOPHILS ABSOLUTE: 0.13 E9/L (ref 0.05–0.5)
EOSINOPHILS RELATIVE PERCENT: 0.9 % (ref 0–6)
GFR AFRICAN AMERICAN: 21
GFR NON-AFRICAN AMERICAN: 17 ML/MIN/1.73
GLUCOSE BLD-MCNC: 108 MG/DL (ref 74–99)
GLUCOSE URINE: NEGATIVE MG/DL
HCT VFR BLD CALC: 41.8 % (ref 34–48)
HEMOGLOBIN: 15.1 G/DL (ref 11.5–15.5)
IMMATURE GRANULOCYTES #: 0.1 E9/L
IMMATURE GRANULOCYTES %: 0.7 % (ref 0–5)
KETONES, URINE: NEGATIVE MG/DL
LACTIC ACID: 0.9 MMOL/L (ref 0.5–2.2)
LEUKOCYTE ESTERASE, URINE: ABNORMAL
LIPASE: 38 U/L (ref 13–60)
LYMPHOCYTES ABSOLUTE: 3.09 E9/L (ref 1.5–4)
LYMPHOCYTES RELATIVE PERCENT: 22.2 % (ref 20–42)
MCH RBC QN AUTO: 34.2 PG (ref 26–35)
MCHC RBC AUTO-ENTMCNC: 36.1 % (ref 32–34.5)
MCV RBC AUTO: 94.6 FL (ref 80–99.9)
MONOCYTES ABSOLUTE: 1.12 E9/L (ref 0.1–0.95)
MONOCYTES RELATIVE PERCENT: 8.1 % (ref 2–12)
NEUTROPHILS ABSOLUTE: 9.41 E9/L (ref 1.8–7.3)
NEUTROPHILS RELATIVE PERCENT: 67.8 % (ref 43–80)
NITRITE, URINE: NEGATIVE
PDW BLD-RTO: 11.9 FL (ref 11.5–15)
PH UA: 6 (ref 5–9)
PLATELET # BLD: 255 E9/L (ref 130–450)
PMV BLD AUTO: 9.1 FL (ref 7–12)
POTASSIUM SERPL-SCNC: 3 MMOL/L (ref 3.5–5)
PROTEIN UA: 30 MG/DL
RBC # BLD: 4.42 E12/L (ref 3.5–5.5)
RBC UA: ABNORMAL /HPF (ref 0–2)
SODIUM BLD-SCNC: 129 MMOL/L (ref 132–146)
SPECIFIC GRAVITY UA: >=1.03 (ref 1–1.03)
TOTAL PROTEIN: 8.4 G/DL (ref 6.4–8.3)
UROBILINOGEN, URINE: 0.2 E.U./DL
WBC # BLD: 13.9 E9/L (ref 4.5–11.5)
WBC UA: ABNORMAL /HPF (ref 0–5)

## 2022-09-06 PROCEDURE — 84300 ASSAY OF URINE SODIUM: CPT

## 2022-09-06 PROCEDURE — 84540 ASSAY OF URINE/UREA-N: CPT

## 2022-09-06 PROCEDURE — 83605 ASSAY OF LACTIC ACID: CPT

## 2022-09-06 PROCEDURE — 83690 ASSAY OF LIPASE: CPT

## 2022-09-06 PROCEDURE — 6370000000 HC RX 637 (ALT 250 FOR IP): Performed by: EMERGENCY MEDICINE

## 2022-09-06 PROCEDURE — 96374 THER/PROPH/DIAG INJ IV PUSH: CPT

## 2022-09-06 PROCEDURE — 80076 HEPATIC FUNCTION PANEL: CPT

## 2022-09-06 PROCEDURE — 2580000003 HC RX 258: Performed by: EMERGENCY MEDICINE

## 2022-09-06 PROCEDURE — 81001 URINALYSIS AUTO W/SCOPE: CPT

## 2022-09-06 PROCEDURE — 2060000000 HC ICU INTERMEDIATE R&B

## 2022-09-06 PROCEDURE — 85025 COMPLETE CBC W/AUTO DIFF WBC: CPT

## 2022-09-06 PROCEDURE — 80048 BASIC METABOLIC PNL TOTAL CA: CPT

## 2022-09-06 PROCEDURE — 82570 ASSAY OF URINE CREATININE: CPT

## 2022-09-06 PROCEDURE — 6360000002 HC RX W HCPCS: Performed by: EMERGENCY MEDICINE

## 2022-09-06 PROCEDURE — 99285 EMERGENCY DEPT VISIT HI MDM: CPT

## 2022-09-06 RX ORDER — ONDANSETRON 2 MG/ML
8 INJECTION INTRAMUSCULAR; INTRAVENOUS ONCE
Status: COMPLETED | OUTPATIENT
Start: 2022-09-06 | End: 2022-09-06

## 2022-09-06 RX ORDER — LISINOPRIL 10 MG/1
10 TABLET ORAL DAILY
Status: ON HOLD | COMMUNITY
End: 2022-09-08 | Stop reason: SDUPTHER

## 2022-09-06 RX ORDER — ONDANSETRON 4 MG/1
4 TABLET, ORALLY DISINTEGRATING ORAL EVERY 8 HOURS PRN
Status: DISCONTINUED | OUTPATIENT
Start: 2022-09-06 | End: 2022-09-08 | Stop reason: HOSPADM

## 2022-09-06 RX ORDER — SODIUM CHLORIDE 0.9 % (FLUSH) 0.9 %
5-40 SYRINGE (ML) INJECTION PRN
Status: DISCONTINUED | OUTPATIENT
Start: 2022-09-06 | End: 2022-09-08 | Stop reason: HOSPADM

## 2022-09-06 RX ORDER — ONDANSETRON 2 MG/ML
4 INJECTION INTRAMUSCULAR; INTRAVENOUS EVERY 6 HOURS PRN
Status: DISCONTINUED | OUTPATIENT
Start: 2022-09-06 | End: 2022-09-08 | Stop reason: HOSPADM

## 2022-09-06 RX ORDER — ACETAMINOPHEN 325 MG/1
650 TABLET ORAL EVERY 6 HOURS PRN
Status: DISCONTINUED | OUTPATIENT
Start: 2022-09-06 | End: 2022-09-08 | Stop reason: HOSPADM

## 2022-09-06 RX ORDER — POTASSIUM CHLORIDE 20 MEQ/1
40 TABLET, EXTENDED RELEASE ORAL ONCE
Status: COMPLETED | OUTPATIENT
Start: 2022-09-06 | End: 2022-09-06

## 2022-09-06 RX ORDER — 0.9 % SODIUM CHLORIDE 0.9 %
1000 INTRAVENOUS SOLUTION INTRAVENOUS ONCE
Status: COMPLETED | OUTPATIENT
Start: 2022-09-06 | End: 2022-09-06

## 2022-09-06 RX ORDER — ACETAMINOPHEN 650 MG/1
650 SUPPOSITORY RECTAL EVERY 6 HOURS PRN
Status: DISCONTINUED | OUTPATIENT
Start: 2022-09-06 | End: 2022-09-08 | Stop reason: HOSPADM

## 2022-09-06 RX ORDER — SODIUM CHLORIDE 9 MG/ML
INJECTION, SOLUTION INTRAVENOUS ONCE
Status: COMPLETED | OUTPATIENT
Start: 2022-09-06 | End: 2022-09-06

## 2022-09-06 RX ORDER — SODIUM CHLORIDE 9 MG/ML
INJECTION, SOLUTION INTRAVENOUS PRN
Status: DISCONTINUED | OUTPATIENT
Start: 2022-09-06 | End: 2022-09-08 | Stop reason: HOSPADM

## 2022-09-06 RX ORDER — SODIUM CHLORIDE 0.9 % (FLUSH) 0.9 %
5-40 SYRINGE (ML) INJECTION EVERY 12 HOURS SCHEDULED
Status: DISCONTINUED | OUTPATIENT
Start: 2022-09-06 | End: 2022-09-08 | Stop reason: HOSPADM

## 2022-09-06 RX ORDER — HEPARIN SODIUM 10000 [USP'U]/ML
5000 INJECTION, SOLUTION INTRAVENOUS; SUBCUTANEOUS EVERY 8 HOURS SCHEDULED
Status: DISCONTINUED | OUTPATIENT
Start: 2022-09-06 | End: 2022-09-08 | Stop reason: HOSPADM

## 2022-09-06 RX ADMIN — SODIUM CHLORIDE 1000 ML: 9 INJECTION, SOLUTION INTRAVENOUS at 19:58

## 2022-09-06 RX ADMIN — POTASSIUM CHLORIDE 40 MEQ: 1500 TABLET, EXTENDED RELEASE ORAL at 19:55

## 2022-09-06 RX ADMIN — ONDANSETRON 8 MG: 2 INJECTION INTRAMUSCULAR; INTRAVENOUS at 19:55

## 2022-09-06 RX ADMIN — SODIUM CHLORIDE: 9 INJECTION, SOLUTION INTRAVENOUS at 21:08

## 2022-09-06 ASSESSMENT — ENCOUNTER SYMPTOMS
EYE PAIN: 0
WHEEZING: 0
BACK PAIN: 0
SHORTNESS OF BREATH: 0
COUGH: 0
EYE DISCHARGE: 0
SINUS PRESSURE: 0
NAUSEA: 1
SORE THROAT: 0
DIARRHEA: 0
VOMITING: 0
EYE REDNESS: 0
ABDOMINAL DISTENTION: 0

## 2022-09-06 ASSESSMENT — PAIN - FUNCTIONAL ASSESSMENT
PAIN_FUNCTIONAL_ASSESSMENT: NONE - DENIES PAIN

## 2022-09-06 NOTE — ED PROVIDER NOTES
Patient with history of breast cancer being treated by Dr. Gwen Velasquez. She was recently started on new chemotherapy. Since that time, she has had no appetite. Has been unable to keep down any food or drink secondary to the nausea. She denies any fever or chills. Was sent in by her oncologist after outpatient lab work demonstrated renal failure today. The history is provided by the patient. Illness   The current episode started 3 to 5 days ago. The onset was gradual. The problem occurs continuously. The problem has been unchanged. The problem is moderate. Nothing relieves the symptoms. Nothing aggravates the symptoms. Associated symptoms include nausea. Pertinent negatives include no fever, no diarrhea, no vomiting, no headaches, no sore throat, no cough, no wheezing, no rash, no eye discharge, no eye pain and no eye redness. Review of Systems   Constitutional:  Positive for activity change, appetite change and fatigue. Negative for chills and fever. HENT:  Negative for sinus pressure and sore throat. Eyes:  Negative for pain, discharge and redness. Respiratory:  Negative for cough, shortness of breath and wheezing. Cardiovascular:  Negative for chest pain. Gastrointestinal:  Positive for nausea. Negative for abdominal distention, diarrhea and vomiting. Genitourinary:  Negative for dysuria and frequency. Musculoskeletal:  Negative for arthralgias and back pain. Skin:  Negative for rash and wound. Neurological:  Negative for weakness and headaches. Hematological:  Negative for adenopathy. All other systems reviewed and are negative. Physical Exam  Vitals and nursing note reviewed. Constitutional:       Appearance: She is well-developed. HENT:      Head: Normocephalic and atraumatic. Eyes:      Pupils: Pupils are equal, round, and reactive to light. Cardiovascular:      Rate and Rhythm: Normal rate and regular rhythm. Heart sounds: Normal heart sounds.  No murmur heard.  Pulmonary:      Effort: Pulmonary effort is normal. No respiratory distress. Breath sounds: Normal breath sounds. No wheezing or rales. Abdominal:      General: Bowel sounds are normal.      Palpations: Abdomen is soft. Tenderness: There is no abdominal tenderness. There is no guarding or rebound. Musculoskeletal:      Cervical back: Normal range of motion and neck supple. Skin:     General: Skin is warm and dry. Neurological:      Mental Status: She is alert and oriented to person, place, and time. Cranial Nerves: No cranial nerve deficit. Coordination: Coordination normal.        Procedures     MDM            --------------------------------------------- PAST HISTORY ---------------------------------------------  Past Medical History:  has a past medical history of Cancer ([facility] [place] [address] Late effect of radiation, Radiation necrosis of skin and subcutaneous, and Ulcer of skin of breast ([facility] [place] [address] Past Surgical History:  has a past surgical history that includes Breast lumpectomy (Right); Partial hysterectomy; Tonsillectomy and adenoidectomy; Carpal tunnel release (Right); Breast biopsy (Bilateral, 7/6/2021); Port Surgery (Left, 7/6/2021); and Chest Wall Resection (Right, 2/8/2022). Social History:  reports that she has quit smoking. She smoked an average of .5 packs per day. She quit smokeless tobacco use about 5 months ago. She reports current alcohol use. She reports that she does not use drugs. Family History: family history includes Cancer in her mother. The patients home medications have been reviewed.     Allergies: Meperidine, Morphine, and Sulfa antibiotics    -------------------------------------------------- RESULTS -------------------------------------------------    LABS:  Results for orders placed or performed during the hospital encounter of 09/06/22   Hepatic Function Panel   Result Value Ref Range    Total Protein 8.4 (H) 6.4 - 8.3 g/dL    Albumin 5.0 3.5 - 5.2 g/dL    Alkaline Phosphatase 125 (H) 35 - 104 U/L    ALT 72 (H) 0 - 32 U/L    AST 59 (H) 0 - 31 U/L    Total Bilirubin 0.4 0.0 - 1.2 mg/dL    Bilirubin, Direct <0.2 0.0 - 0.3 mg/dL    Bilirubin, Indirect see below 0.0 - 1.0 mg/dL   Basic Metabolic Panel   Result Value Ref Range    Sodium 129 (L) 132 - 146 mmol/L    Potassium 3.0 (L) 3.5 - 5.0 mmol/L    Chloride 91 (L) 98 - 107 mmol/L    CO2 17 (L) 22 - 29 mmol/L    Anion Gap 21 (H) 7 - 16 mmol/L    Glucose 108 (H) 74 - 99 mg/dL    BUN 66 (H) 6 - 20 mg/dL    Creatinine 2.9 (H) 0.5 - 1.0 mg/dL    GFR Non-African American 17 >=60 mL/min/1.73    GFR African American 21     Calcium 9.4 8.6 - 10.2 mg/dL   Urinalysis with Microscopic   Result Value Ref Range    Color, UA Yellow Straw/Yellow    Clarity, UA Clear Clear    Glucose, Ur Negative Negative mg/dL    Bilirubin Urine MODERATE (A) Negative    Ketones, Urine Negative Negative mg/dL    Specific Gravity, UA >=1.030 1.005 - 1.030    Blood, Urine SMALL (A) Negative    pH, UA 6.0 5.0 - 9.0    Protein, UA 30 (A) Negative mg/dL    Urobilinogen, Urine 0.2 <2.0 E.U./dL    Nitrite, Urine Negative Negative    Leukocyte Esterase, Urine SMALL (A) Negative    WBC, UA 1-3 0 - 5 /HPF    RBC, UA 0-1 0 - 2 /HPF    Bacteria, UA FEW (A) None Seen /HPF   CBC with Auto Differential   Result Value Ref Range    WBC 13.9 (H) 4.5 - 11.5 E9/L    RBC 4.42 3.50 - 5.50 E12/L    Hemoglobin 15.1 11.5 - 15.5 g/dL    Hematocrit 41.8 34.0 - 48.0 %    MCV 94.6 80.0 - 99.9 fL    MCH 34.2 26.0 - 35.0 pg    MCHC 36.1 (H) 32.0 - 34.5 %    RDW 11.9 11.5 - 15.0 fL    Platelets 447 826 - 665 E9/L    MPV 9.1 7.0 - 12.0 fL    Neutrophils % 67.8 43.0 - 80.0 %    Immature Granulocytes % 0.7 0.0 - 5.0 %    Lymphocytes % 22.2 20.0 - 42.0 %    Monocytes % 8.1 2.0 - 12.0 %    Eosinophils % 0.9 0.0 - 6.0 %    Basophils % 0.3 0.0 - 2.0 %    Neutrophils Absolute 9.41 (H) 1.80 - 7.30 E9/L    Immature Granulocytes # 0.10 E9/L    Lymphocytes Absolute 3.09 1.50 - 4.00 E9/L Monocytes Absolute 1.12 (H) 0.10 - 0.95 E9/L    Eosinophils Absolute 0.13 0.05 - 0.50 E9/L    Basophils Absolute 0.04 0.00 - 0.20 E9/L   Lactic Acid   Result Value Ref Range    Lactic Acid 0.9 0.5 - 2.2 mmol/L   Lipase   Result Value Ref Range    Lipase 38 13 - 60 U/L       RADIOLOGY:  No orders to display           ------------------------- NURSING NOTES AND VITALS REVIEWED ---------------------------  Date / Time Roomed:  9/6/2022  6:24 PM  ED Bed Assignment:  13/13    The nursing notes within the ED encounter and vital signs as below have been reviewed. Patient Vitals for the past 24 hrs:   BP Temp Temp src Pulse Resp SpO2 Height Weight   09/06/22 1849 111/68 -- -- 83 16 99 % -- --   09/06/22 1847 -- -- -- -- -- -- 4' 11\" (1.499 m) 138 lb (62.6 kg)   09/06/22 1603 106/78 98.6 °F (37 °C) Oral 92 18 99 % -- 152 lb (68.9 kg)       Oxygen Saturation Interpretation: Normal    ------------------------------------------ PROGRESS NOTES ------------------------------------------  Re-evaluation(s):  Time: 1952  Patients symptoms show no change  Repeat physical examination is not changed    Counseling:  I have spoken with the patient and discussed todays results, in addition to providing specific details for the plan of care and counseling regarding the diagnosis and prognosis. Their questions are answered at this time and they are agreeable with the plan of admission.    --------------------------------- ADDITIONAL PROVIDER NOTES ---------------------------------  Consultations:  Time: 2014. Spoke with Temo Shay for Dr Prema Cardenas. Discussed case. They will admit the patient. This patient's ED course included: a personal history and physicial examination, multiple bedside re-evaluations, IV medications, continuous pulse oximetry, and complex medical decision making and emergency management    This patient has remained hemodynamically stable during their ED course. Diagnosis:  1.  Acute renal failure, unspecified acute renal failure type (HCC)    2. Dehydration    3. Hypokalemia    4. Hyponatremia        Disposition:  Patient's disposition: Admit to telemetry  Patient's condition is stable.             Hallie Burrell DO  09/06/22 2016

## 2022-09-06 NOTE — ED NOTES
Department of Emergency Medicine  FIRST PROVIDER TRIAGE NOTE             Independent MLP           9/6/22  4:03 PM EDT    Date of Encounter: 9/6/22   MRN: [id number]      HPI: Waylon Perez is a 50 y.o. female who presents to the ED for Dehydration (Sent by dr Denis Uribe for eval and hydration)     Patient states that since Sunday morning she had nausea, vomiting and diarrhea. Patient states she went to her doctor they were very concerned about her liver enzymes as well as her dehydration therefore they sent her in for fluids and further evaluation. Patient denies any fevers or chills    ROS: Negative for cp, sob, or abd pain. PE: Gen Appearance/Constitutional: alert  HEENT: NC/NT. PERRLA,  Airway patent. Neck: supple     Initial Plan of Care: All treatment areas with department are currently occupied. Plan to order/Initiate the following while awaiting opening in ED: labs, EKG, and imaging studies.   Initiate Treatment-Testing, Proceed toTreatment Area When Bed Available for ED Attending/MLP to Continue Care    Electronically signed by Bebe Aguilera PA-C   DD: 9/6/22       Bebe Aguilera PA-C  09/06/22 4476

## 2022-09-07 LAB
ALBUMIN SERPL-MCNC: 4.2 G/DL (ref 3.5–5.2)
ANION GAP SERPL CALCULATED.3IONS-SCNC: 14 MMOL/L (ref 7–16)
BASOPHILS ABSOLUTE: 0.04 E9/L (ref 0–0.2)
BASOPHILS RELATIVE PERCENT: 0.3 % (ref 0–2)
BUN BLDV-MCNC: 53 MG/DL (ref 6–20)
CALCIUM SERPL-MCNC: 8.6 MG/DL (ref 8.6–10.2)
CHLORIDE BLD-SCNC: 103 MMOL/L (ref 98–107)
CO2: 16 MMOL/L (ref 22–29)
CREAT SERPL-MCNC: 1.7 MG/DL (ref 0.5–1)
EOSINOPHILS ABSOLUTE: 0.23 E9/L (ref 0.05–0.5)
EOSINOPHILS RELATIVE PERCENT: 1.9 % (ref 0–6)
GFR AFRICAN AMERICAN: 38
GFR NON-AFRICAN AMERICAN: 32 ML/MIN/1.73
GLUCOSE BLD-MCNC: 101 MG/DL (ref 74–99)
HCT VFR BLD CALC: 35 % (ref 34–48)
HEMOGLOBIN: 12.5 G/DL (ref 11.5–15.5)
IMMATURE GRANULOCYTES #: 0.14 E9/L
IMMATURE GRANULOCYTES %: 1.2 % (ref 0–5)
LYMPHOCYTES ABSOLUTE: 2.98 E9/L (ref 1.5–4)
LYMPHOCYTES RELATIVE PERCENT: 24.6 % (ref 20–42)
MCH RBC QN AUTO: 33.7 PG (ref 26–35)
MCHC RBC AUTO-ENTMCNC: 35.7 % (ref 32–34.5)
MCV RBC AUTO: 94.3 FL (ref 80–99.9)
MONOCYTES ABSOLUTE: 0.99 E9/L (ref 0.1–0.95)
MONOCYTES RELATIVE PERCENT: 8.2 % (ref 2–12)
NEUTROPHILS ABSOLUTE: 7.71 E9/L (ref 1.8–7.3)
NEUTROPHILS RELATIVE PERCENT: 63.8 % (ref 43–80)
PDW BLD-RTO: 11.9 FL (ref 11.5–15)
PHOSPHORUS: 3.4 MG/DL (ref 2.5–4.5)
PLATELET # BLD: 221 E9/L (ref 130–450)
PMV BLD AUTO: 9.1 FL (ref 7–12)
POTASSIUM SERPL-SCNC: 3.3 MMOL/L (ref 3.5–5)
RBC # BLD: 3.71 E12/L (ref 3.5–5.5)
SODIUM BLD-SCNC: 133 MMOL/L (ref 132–146)
SODIUM URINE: <20 MMOL/L
UREA NITROGEN, UR: 1074 MG/DL (ref 800–1666)
WBC # BLD: 12.1 E9/L (ref 4.5–11.5)

## 2022-09-07 PROCEDURE — 6360000002 HC RX W HCPCS: Performed by: INTERNAL MEDICINE

## 2022-09-07 PROCEDURE — 2580000003 HC RX 258: Performed by: FAMILY MEDICINE

## 2022-09-07 PROCEDURE — 6370000000 HC RX 637 (ALT 250 FOR IP): Performed by: INTERNAL MEDICINE

## 2022-09-07 PROCEDURE — 85025 COMPLETE CBC W/AUTO DIFF WBC: CPT

## 2022-09-07 PROCEDURE — 80069 RENAL FUNCTION PANEL: CPT

## 2022-09-07 PROCEDURE — 36415 COLL VENOUS BLD VENIPUNCTURE: CPT

## 2022-09-07 PROCEDURE — 2060000000 HC ICU INTERMEDIATE R&B

## 2022-09-07 RX ORDER — DOCUSATE SODIUM 100 MG/1
100 CAPSULE, LIQUID FILLED ORAL NIGHTLY
Status: DISCONTINUED | OUTPATIENT
Start: 2022-09-07 | End: 2022-09-08 | Stop reason: HOSPADM

## 2022-09-07 RX ORDER — SODIUM CHLORIDE AND POTASSIUM CHLORIDE .9; .15 G/100ML; G/100ML
SOLUTION INTRAVENOUS CONTINUOUS
Status: DISCONTINUED | OUTPATIENT
Start: 2022-09-07 | End: 2022-09-08

## 2022-09-07 RX ORDER — POTASSIUM CHLORIDE 20 MEQ/1
40 TABLET, EXTENDED RELEASE ORAL 2 TIMES DAILY WITH MEALS
Status: DISCONTINUED | OUTPATIENT
Start: 2022-09-07 | End: 2022-09-08

## 2022-09-07 RX ADMIN — POTASSIUM CHLORIDE 40 MEQ: 1500 TABLET, EXTENDED RELEASE ORAL at 11:02

## 2022-09-07 RX ADMIN — POTASSIUM CHLORIDE AND SODIUM CHLORIDE: 900; 150 INJECTION, SOLUTION INTRAVENOUS at 20:45

## 2022-09-07 RX ADMIN — Medication 10 ML: at 20:49

## 2022-09-07 RX ADMIN — POTASSIUM CHLORIDE AND SODIUM CHLORIDE: 900; 150 INJECTION, SOLUTION INTRAVENOUS at 11:01

## 2022-09-07 ASSESSMENT — PAIN SCALES - GENERAL: PAINLEVEL_OUTOF10: 0

## 2022-09-07 NOTE — PLAN OF CARE
Problem: ABCDS Injury Assessment  Goal: Absence of physical injury  Outcome: Progressing     Problem: Discharge Planning  Goal: Discharge to home or other facility with appropriate resources  Recent Flowsheet Documentation  Taken 9/6/2022 2209 by Chio Nixon RN  Discharge to home or other facility with appropriate resources:   Identify barriers to discharge with patient and caregiver   Identify discharge learning needs (meds, wound care, etc)   Refer to discharge planning if patient needs post-hospital services based on physician order or complex needs related to functional status, cognitive ability or social support system       Outcome: Progressing

## 2022-09-07 NOTE — CARE COORDINATION
Transition of Care-Met with patient and daughter at bedside. Patient lives alone in a two story home, thirteen steps to gain entry. Patient reports independence with ADL's, uses no assistive devices to ambulate. PCP is Dr. Daxa Ravi, preferred pharmacy is [facility] in [place]. No Hx: SNF stay, did use [facility] in the past, denies need for [facility] this stay. Anticipate discharge in the next 24-48 hrs after electrolytes are replaced, kidney function improvement. Daughter will transport home.      Miguel DELUNA, RN  [facility]

## 2022-09-07 NOTE — PROGRESS NOTES
Physical Therapy  PT orders received. Pt reports she is independent with mobility and does not need inpatient PT services. Will discharge order. Pt instructed to notify staff if PT needs arise.

## 2022-09-07 NOTE — PROGRESS NOTES
Comprehensive Nutrition Assessment    Type and Reason for Visit:  Initial, Positive Nutrition Screen    Nutrition Recommendations/Plan:     Will continue current diet & monitor. Malnutrition Assessment:  Malnutrition Status: At risk for malnutrition (Comment) (d/t catabolic illness & chemo tx) (09/07/22 1840)        Nutrition Assessment:    Reoccurrence Breast CA. Started new chemotherapy w/ no appetite & unable to keep down any food or drink secondary to the nausea (~3-5 days). Pt states appetite was good before & dr felt wt loss d/t dehydration. Appetite good again currently per pt. Pt at nut'l risk for catabolic illness/chemo tx. Declined ONS. Will continue current diet & monitor. Nutrition Related Findings:    A&O, +BS,  no edema, fatigue, nausea & decreased appetite  both resolved per pt, abnormal renal/liver labs Wound Type: None       Current Nutrition Intake & Therapies:    Average Meal Intake: %     ADULT DIET; Regular    Anthropometric Measures:  Height: 4' 11\" (149.9 cm)  Ideal Body Weight (IBW): 95 lbs (43 kg)    Admission Body Weight: 144 lb 6.4 oz (65.5 kg) (9/6 bed)  Current Body Weight: 151 lb 6.4 oz (68.7 kg), 159.4 % IBW. Weight Source: Bed Scale (9/7)  Current BMI (kg/m2): 30.6  Usual Body Weight: 152 lb 12.8 oz (69.3 kg) (6/17/22 actual measure in EMR)  % Weight Change (Calculated): -0.9  Weight Adjustment For: No Adjustment                 BMI Categories: Obese Class 1 (BMI 30.0-34. 9)      Nutrition Diagnosis:   No nutrition diagnosis at this time      Nutrition Interventions:   Food and/or Nutrient Delivery: Continue Current Diet  Nutrition Education/Counseling: Education not indicated  Coordination of Nutrition Care: Continue to monitor while inpatient       Goals:     Goals: by next RD assessment, PO intake 75% or greater       Nutrition Monitoring and Evaluation:      Food/Nutrient Intake Outcomes: Food and Nutrient Intake (declines ONS)  Physical Signs/Symptoms Outcomes: Biochemical Data, Nutrition Focused Physical Findings, Skin, Weight, Fluid Status or Edema    Discharge Planning:    Continue current diet     Rebecca Gómez RD  Contact: [phone]

## 2022-09-07 NOTE — H&P
Hospital Medicine History & Physical      PCP: Tonya Foster MD    Date of Admission: 9/6/2022    Date of Service: . SEPT 7, 2022  Chief Complaint:   NV, DIARRHEA. History Of Present Illness:     50 y.o. female presented with SHE HAS BEEN RECEIVING NEULAX AND GENETIC BLOCKER? AFTER HAVING A REOCCURRENCE OF  OF BREAST CANCER. SHE DEVELOPED NV DIARRHEA. FOUND TO BE DEHYDRATED /    Past Medical History:          Diagnosis Date    Cancer ([facility] [address] 2008    right breast    Late effect of radiation 3/30/2022    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    Ulcer of skin of breast ([facility] [address] 3/30/2022       Past Surgical History:          Procedure Laterality Date    BREAST BIOPSY Bilateral 7/6/2021    RIGHT MODIFIED RADICAL MASTECTOMY,  LEFT BREAST PROPHYLATIC MASTECTOMY performed by Lonnie Flores MD at [place] [address] LUMPECTOMY Right     CARPAL TUNNEL RELEASE Right     CHEST WALL RESECTION Right 2/8/2022    RIGHT CHEST WALL COMPLEX WOUND CLOSURE AND WOUND VAC performed by Lonnie Flores MD at [address] (CERVIX NOT REMOVED)      PORT SURGERY Left 7/6/2021    LEFT SUBCLAVIAN MEDI PORT INSERTION performed by Lonnie Flores MD at [facility]         Medications Prior to Admission:      Prior to Admission medications    Medication Sig Start Date End Date Taking?  Authorizing Provider   lisinopril (PRINIVIL;ZESTRIL) 10 MG tablet Take 10 mg by mouth daily   Yes Historical Provider, MD   Multiple Vitamins-Minerals (THERAPEUTIC MULTIVITAMIN-MINERALS) tablet Take 1 tablet by mouth daily     Historical Provider, MD   BIOTIN PO Take by mouth     Historical Provider, MD   NONFORMULARY Inject into the muscle every 21 days Chemo cocktail:  Pertuzumab, Trastuzumab, Hyaluronidase  Goes to [facility] for the injection     Historical Provider, MD       Allergies:  Meperidine, Morphine, and Sulfa antibiotics    Social History:      The patient currently lives  WITH     TOBACCO:   reports that she has quit smoking. She smoked an average of .5 packs per day. She quit smokeless tobacco use about 5 months ago. ETOH:   reports current alcohol use. Family History:      Reviewed in detail and negative for DM, CAD, Cancer, CVA. Positive as follows:        Problem Relation Age of Onset    Cancer Mother         lung       REVIEW OF SYSTEMS:   Pertinent positives as noted in the HPI. All other systems reviewed and negative. PHYSICAL EXAM:    /70   Pulse 80   Temp 97.1 °F (36.2 °C) (Infrared)   Resp 18   Ht 4' 11\" (1.499 m)   Wt 144 lb 6.4 oz (65.5 kg)   SpO2 98%   BMI 29.17 kg/m²     General appearance:  No apparent distress, appears stated age and cooperative. HEENT:  Normal cephalic, atraumatic without obvious deformity. Pupils equal, round, and reactive to light. Extra ocular muscles intact. Conjunctivae/corneas clear. Neck: Supple, with full range of motion. No jugular venous distention. Trachea midline. Respiratory:  Normal respiratory effort. Clear to auscultation, bilaterally without Rales/Wheezes/Rhonchi. LEFT MEDIPORT  Cardiovascular:  Regular rate and rhythm   Abdomen: Soft, non-tender, non-distended with normal bowel sounds. Musculoskeletal:  No clubbing, cyanosis or edema bilaterally. Skin: Skin color, texture, turgor normal.  No rashes or lesions. Neurologic:  Neurovascularly intact without any focal sensory/motor deficits.  Cranial nerves: II-XII intact, grossly non-focal.  Psychiatric:  Alert and oriented, thought content appropriate, normal insight        Labs:     Recent Labs     09/06/22  1819 09/07/22  0500   WBC 13.9* 12.1*   HGB 15.1 12.5   HCT 41.8 35.0    221     Recent Labs     09/06/22  1819 09/07/22  0500   * 133   K 3.0* 3.3*   CL 91* 103   CO2 17* 16*   BUN 66* 53*   CREATININE 2. 9* 1.7*   CALCIUM 9.4 8.6   PHOS  --  3.4     Recent Labs     09/06/22  1819   AST 59*   ALT 72*   BILIDIR <0.2   BILITOT 0.4   ALKPHOS 125*     No results for input(s): INR in the last 72 hours. No results for input(s): Ariana Castillo in the last 72 hours. Urinalysis:      Lab Results   Component Value Date/Time    NITRU Negative 09/06/2022 06:58 PM    WBCUA 1-3 09/06/2022 06:58 PM    BACTERIA FEW 09/06/2022 06:58 PM    RBCUA 0-1 09/06/2022 06:58 PM    BLOODU SMALL 09/06/2022 06:58 PM    SPECGRAV >=1.030 09/06/2022 06:58 PM    GLUCOSEU Negative 09/06/2022 06:58 PM       Radiology:       No orders to display       ASSESSMENT:    Active Hospital Problems    Diagnosis Date Noted    Acute renal failure (ARF) ([facility] [address] [N17.9] 09/06/2022     Priority: Medium   CKD 4   DEHYDRATION   BREAST CANCER HISTORY   HYPOKALEMIA    PLAN:  HYDRATION  REPLACE LYTES      DVT Prophylaxis: HEPARIN  Diet: ADULT DIET; Regular  Code Status: Full Code    PT/OT Eval Status: NA    Dispo - HOME    Electronically signed by Yissel Bailey DO on 9/7/2022 at 2:03 PM [facility]       Thank you Marie Harrison MD for the opportunity to be involved in this patient's care.  If you have any questions or concerns please feel free to contact me at [phone]

## 2022-09-07 NOTE — PROGRESS NOTES
Occupational Therapy    OT order received and chart reviewed. Pt observed to completes ADLs/functional mobility/transfers Mod I, demo'ing good balance/safety awareness. Pt reports no needed DME for home. No OT needs at this time. OT order discontinued. Please re-consult if there is a change in functional status.     Dario Flores, [facility], OTR/L [id number]

## 2022-09-08 VITALS
RESPIRATION RATE: 18 BRPM | SYSTOLIC BLOOD PRESSURE: 112 MMHG | HEIGHT: 59 IN | HEART RATE: 71 BPM | OXYGEN SATURATION: 99 % | BODY MASS INDEX: 29.61 KG/M2 | TEMPERATURE: 98.6 F | DIASTOLIC BLOOD PRESSURE: 84 MMHG | WEIGHT: 146.9 LBS

## 2022-09-08 LAB
ANION GAP SERPL CALCULATED.3IONS-SCNC: 10 MMOL/L (ref 7–16)
BUN BLDV-MCNC: 25 MG/DL (ref 6–20)
CALCIUM SERPL-MCNC: 8.4 MG/DL (ref 8.6–10.2)
CHLORIDE BLD-SCNC: 107 MMOL/L (ref 98–107)
CO2: 18 MMOL/L (ref 22–29)
CREAT SERPL-MCNC: 0.9 MG/DL (ref 0.5–1)
GFR AFRICAN AMERICAN: >60
GFR NON-AFRICAN AMERICAN: >60 ML/MIN/1.73
GLUCOSE BLD-MCNC: 92 MG/DL (ref 74–99)
POTASSIUM SERPL-SCNC: 3.2 MMOL/L (ref 3.5–5)
SODIUM BLD-SCNC: 135 MMOL/L (ref 132–146)

## 2022-09-08 PROCEDURE — 6360000002 HC RX W HCPCS: Performed by: INTERNAL MEDICINE

## 2022-09-08 PROCEDURE — 2580000003 HC RX 258: Performed by: FAMILY MEDICINE

## 2022-09-08 PROCEDURE — 6370000000 HC RX 637 (ALT 250 FOR IP): Performed by: INTERNAL MEDICINE

## 2022-09-08 PROCEDURE — 36415 COLL VENOUS BLD VENIPUNCTURE: CPT

## 2022-09-08 PROCEDURE — 80048 BASIC METABOLIC PNL TOTAL CA: CPT

## 2022-09-08 RX ORDER — POTASSIUM CHLORIDE 20 MEQ/1
40 TABLET, EXTENDED RELEASE ORAL 2 TIMES DAILY WITH MEALS
Status: DISCONTINUED | OUTPATIENT
Start: 2022-09-08 | End: 2022-09-08 | Stop reason: HOSPADM

## 2022-09-08 RX ORDER — LISINOPRIL 10 MG/1
10 TABLET ORAL DAILY
Qty: 30 TABLET | Refills: 3 | Status: SHIPPED | OUTPATIENT
Start: 2022-09-08

## 2022-09-08 RX ADMIN — POTASSIUM CHLORIDE AND SODIUM CHLORIDE: 900; 150 INJECTION, SOLUTION INTRAVENOUS at 07:00

## 2022-09-08 RX ADMIN — POTASSIUM CHLORIDE 40 MEQ: 1500 TABLET, EXTENDED RELEASE ORAL at 09:55

## 2022-09-08 RX ADMIN — Medication 10 ML: at 08:12

## 2022-09-08 RX ADMIN — POTASSIUM CHLORIDE 40 MEQ: 1500 TABLET, EXTENDED RELEASE ORAL at 16:24

## 2022-09-08 ASSESSMENT — PAIN SCALES - GENERAL: PAINLEVEL_OUTOF10: 0

## 2022-09-08 NOTE — PROGRESS NOTES
Dr Cox Left in to see pt. Ok to go home today from her standpoint. Continue antibiotics for 10 more days and follow up in 2 weeks.

## 2022-09-08 NOTE — PROGRESS NOTES
P Quality Flow/Interdisciplinary Rounds Progress Note        Quality Flow Rounds held on September 8, 2022    Disciplines Attending:  Bedside Nurse, , , and Nursing Unit Leadership    Loreta Rae was admitted on 9/6/2022  6:24 PM    Anticipated Discharge Date:       Disposition:    Yefri Score:  Yefri Scale Score: 21    Readmission Risk              Risk of Unplanned Readmission:  13           Discussed patient goal for the day, patient clinical progression, and barriers to discharge. The following Goal(s) of the Day/Commitment(s) have been identified:  Check discharge today, replace potassium.        Teressa Avalos RN  September 8, 2022

## 2022-09-08 NOTE — CONSULTS
for hydration and management of YUNIOR. In the ED her Na 129, Cl 91, K 3.0, BUN 66, Cr 2.9, GFR 17. LFT's elevated with ALK phos 125, ALT 72, AST 59. CBC with WBC 12.1, ANC 7.71. She was hydrated with IV hydration. Consultation for a patient with known breast ca with dehydration related to diarrhea caused by Nerlynx. Diarrhea has improved. Tolerating orals. Review of systems: Over 10 systems were reviewed and all were negative except as mentioned above.           Diagnostic Data:     Past Medical History:   Diagnosis Date    Cancer [facility]) 2008    right breast    Late effect of radiation 3/30/2022    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    Ulcer of skin of breast ([facility] [address] 3/30/2022       Patient Active Problem List    Diagnosis Date Noted    Acute renal failure (ARF) ([address] 09/06/2022    Radiation necrosis of skin and subcutaneous 03/30/2022    Ulcer of skin of breast ([facility] [address] 03/30/2022    Late effect of radiation 03/30/2022    Wound dehiscence 09/10/2021    S/P mastectomy, bilateral 07/06/2021    Malignant neoplasm of central portion of right breast in female, estrogen receptor negative ([facility] [address] 06/04/2021    Recurrent breast cancer, right ([address] 06/04/2021        Past Surgical History:   Procedure Laterality Date    BREAST BIOPSY Bilateral 7/6/2021    RIGHT MODIFIED RADICAL MASTECTOMY,  LEFT BREAST PROPHYLATIC MASTECTOMY performed by Ambrosio Hooks MD at [place] [address] LUMPECTOMY Right     CARPAL TUNNEL RELEASE Right     CHEST WALL RESECTION Right 2/8/2022    RIGHT CHEST WALL COMPLEX WOUND CLOSURE AND WOUND VAC performed by Ambrosio Hooks MD at [address] (CERVIX NOT REMOVED)      PORT SURGERY Left 7/6/2021    LEFT SUBCLAVIAN MEDI PORT INSERTION performed by Ambrosio Hooks MD at [address] [place] [address]         Family History  Family History   Problem Relation Age of Onset    Cancer Mother         lung       Social History    TOBACCO:   reports that she has quit smoking. She smoked an average of .5 packs per day. She quit smokeless tobacco use about 6 months ago. ETOH:   reports current alcohol use. Home Medications  Prior to Admission medications    Medication Sig Start Date End Date Taking? Authorizing Provider   lisinopril (PRINIVIL;ZESTRIL) 10 MG tablet Take 10 mg by mouth daily   Yes Historical Provider, MD   Multiple Vitamins-Minerals (THERAPEUTIC MULTIVITAMIN-MINERALS) tablet Take 1 tablet by mouth daily     Historical Provider, MD   BIOTIN PO Take by mouth     Historical Provider, MD   NONFORMULARY Inject into the muscle every 21 days Chemo cocktail:  Pertuzumab, Trastuzumab, Hyaluronidase  Goes to [facility] for the injection   Patient not taking: Reported on 9/8/2022    Historical Provider, MD       Allergies  Allergies   Allergen Reactions    Meperidine     Morphine     Sulfa Antibiotics Nausea And Vomiting and Other (See Comments)     fever         Objective  /75   Pulse 76   Temp 98.6 °F (37 °C) (Oral)   Resp 18   Ht 4' 11\" (1.499 m)   Wt 146 lb 14.4 oz (66.6 kg)   SpO2 100%   BMI 29.67 kg/m²     Physical Exam:   Performance Status:  General: AAO to person, place, time, in no acute distress,   Head and neck : PERRLA, EOMI . Sclera non icteric. Oropharynx : Clear  Neck: no JVD,  no adenopathy  Heart: Regular rate and regular rhythm, no murmur  Lungs: Clear to auscultation   Extremities: No edema,no cyanosis, no clubbing. Abdomen: Soft, non-tender;no masses, no organomegaly  Skin:  No rash. Neurologic:Cranial nerves grossly intact. No focal motor or sensory deficits .     Recent Laboratory Data-   Lab Results   Component Value Date    WBC 12.1 (H) 09/07/2022    HGB 12.5 09/07/2022    HCT 35.0 09/07/2022    MCV 94.3 09/07/2022     09/07/2022    LYMPHOPCT 24.6 09/07/2022    RBC 3.71 09/07/2022    MCH 33.7 09/07/2022    MCHC 35.7 (H) 09/07/2022    RDW 11.9 09/07/2022    NEUTOPHILPCT 63.8 09/07/2022    MONOPCT 8.2 09/07/2022    BASOPCT 0.3 09/07/2022    NEUTROABS 7.71 (H) 09/07/2022    LYMPHSABS 2.98 09/07/2022    MONOSABS 0.99 (H) 09/07/2022    EOSABS 0.23 09/07/2022    BASOSABS 0.04 09/07/2022       Lab Results   Component Value Date     09/08/2022    K 3.2 (L) 09/08/2022     09/08/2022    CO2 18 (L) 09/08/2022    BUN 25 (H) 09/08/2022    CREATININE 0.9 09/08/2022    GLUCOSE 92 09/08/2022    CALCIUM 8.4 (L) 09/08/2022    PROT 8.4 (H) 09/06/2022    LABALBU 4.2 09/07/2022    BILITOT 0.4 09/06/2022    ALKPHOS 125 (H) 09/06/2022    AST 59 (H) 09/06/2022    ALT 72 (H) 09/06/2022    LABGLOM >60 09/08/2022    GFRAA >60 09/08/2022       No results found for: IRON, TIBC, FERRITIN        Radiology-    No results found. ASSESSMENT/PLAN :  82-year-old female   - Recurrent breast cancer. She was initially diagnosed in June 2008 s/p treatment as above. In 2021 she was found to have recurrence in the right breast with invasive carcinoma that is ER/OK negative and HER2 3+ by IHC. She is s/p BL mastectomy in July of 2021 and was staged [id number] due to LN involvement. She is s/p adjuvant TCHP and adjuvant radiation therapy. S/p Phesgo last dose on 8/4/22. She was started on Nerlynx 8/17/22 however caused persistent severe grade 3 diarrhea which led to discontinuation of Nerlynx.     -Acute renal failure with hypokalemia.   - CMP Na 129, Cl 91, K 3.0, BUN 66, Cr 2.9, GFR 17. LFT's elevated with ALK phos 125, ALT 72, AST 59. CBC with WBC 12.1, ANC 7.71.    - Hydrated with IV hydration. Attending addendum:  Stage III right-sided hormone negative HER2 positive breast CA s/p bilateral mastectomy and adjuvant TCHP, radiation therapy. Started adjuvant Nerlynx a week ago. Admitted with acute renal failure from severe diarrhea nausea vomiting related to neratinib. Renal failure has improved with hydration. Nausea vomiting has improved she is tolerating orals. Diarrhea down to 5-6 episodes a day.   Neratinib will be

## 2022-09-08 NOTE — PROGRESS NOTES
Dr Denise Irby states patient can go home today. Follow up in clinic. Dr Yamilex Santana aware, will work on papers.

## 2022-09-08 NOTE — PROGRESS NOTES
Consult to Dr. Vergil Severin called into his office.     Electronically signed by Katy Nesbitt RN on 9/8/2022 at 9:42 AM

## 2022-09-08 NOTE — PROGRESS NOTES
Hospitalist Progress Note      PCP: Tiffany Fleming MD    Date of Admission: 9/6/2022        Hospital Course:  50 y.o. female presented with SHE HAS BEEN RECEIVING NEULAX AND GENETIC BLOCKER? AFTER HAVING A REOCCURRENCE OF  OF BREAST CANCER. She was started on Nerlynx 8/17/22 SHE DEVELOPED NV DIARRHEA. FOUND TO BE DEHYDRATED ** TOLERATING HER DIET, CONTINUES TO HAVE DIARRHEA, BUT IT HAS DECREASED         Subjective: FEELING BETTER          Medications:  Reviewed    Infusion Medications    0.9% NaCl with KCl 20 mEq 100 mL/hr at 09/08/22 0700    sodium chloride       Scheduled Medications    potassium chloride  40 mEq Oral BID WC    docusate sodium  100 mg Oral Nightly    sodium chloride flush  5-40 mL IntraVENous 2 times per day    heparin (porcine)  5,000 Units SubCUTAneous 3 times per day     PRN Meds: sodium chloride flush, sodium chloride, ondansetron **OR** ondansetron, acetaminophen **OR** acetaminophen    No intake or output data in the 24 hours ending 09/08/22 1542    Exam:    /84   Pulse 71   Temp 98.6 °F (37 °C) (Oral)   Resp 18   Ht 4' 11\" (1.499 m)   Wt 146 lb 14.4 oz (66.6 kg)   SpO2 99%   BMI 29.67 kg/m²       General appearance:  No apparent distress, appears stated age and cooperative. HEENT:  Normal cephalic,   Neck: Supple, with full range of motion. No jugular venous distention. Trachea midline. Respiratory:  Normal respiratory effort. Clear to auscultation, bilaterally without Rales/Wheezes/Rhonchi. LEFT MEDIPORT  Cardiovascular:  Regular rate and rhythm   Abdomen: Soft, non-tender, non-distended with normal bowel sounds. Musculoskeletal:  No clubbing, cyanosis or edema bilaterally. Skin: Skin color, texture, turgor normal.  No rashes or lesions.   Neurologic:  Neurovascularly intact   Psychiatric:  Alert and oriented,           Labs:   Recent Labs     09/06/22  1819 09/07/22  0500   WBC 13.9* 12.1*   HGB 15.1 12.5   HCT 41.8 35.0    221     Recent Labs 09/06/22  1819 09/07/22  0500 09/08/22  0300   * 133 135   K 3.0* 3.3* 3.2*   CL 91* 103 107   CO2 17* 16* 18*   BUN 66* 53* 25*   CREATININE 2.9* 1.7* 0.9   CALCIUM 9.4 8.6 8.4*   PHOS  --  3.4  --      Recent Labs     09/06/22 1819   AST 59*   ALT 72*   BILIDIR <0.2   BILITOT 0.4   ALKPHOS 125*     No results for input(s): INR in the last 72 hours. No results for input(s): Melody Kaska in the last 72 hours. Recent Labs     09/06/22 1819   AST 59*   ALT 72*   BILIDIR <0.2   BILITOT 0.4   ALKPHOS 125*     Recent Labs     09/06/22 1819   LACTA 0.9     No results found for: Katsusana Valdez  No results found for: AMMONIA    Assessment:    Active Hospital Problems    Diagnosis Date Noted    Acute renal failure (ARF) ([facility] [address] [N17.9] 09/06/2022     Priority: Medium   CKD 4   DEHYDRATION   BREAST CANCER HISTORY   HYPOKALEMIA     PLAN:  HYDRATION  REPLACE LYTES   ONCOLOGY CONSULT      DVT Prophylaxis: HEPARIN  Diet: ADULT DIET;  Regular  Code Status: Full Code     PT/OT Eval Status: NA     Dispo - HOME    Electronically signed by Susanne Longoria DO on 9/8/2022 at 3:42 PM [facility]

## 2022-09-08 NOTE — CONSULTS
Dr Dayanna Wharton, oncologist for N/V/diarrhea after receiving meds for tx of breast cancer, he will be given message per  .

## 2022-09-08 NOTE — PLAN OF CARE
Problem: Discharge Planning  Goal: Discharge to home or other facility with appropriate resources  Outcome: Adequate for Discharge     Problem: ABCDS Injury Assessment  Goal: Absence of physical injury  Outcome: Adequate for Discharge     Problem: Pain  Goal: Verbalizes/displays adequate comfort level or baseline comfort level  Outcome: Adequate for Discharge  Flowsheets (Taken 9/8/2022 4919)  Verbalizes/displays adequate comfort level or baseline comfort level: Encourage patient to monitor pain and request assistance

## 2022-12-09 ENCOUNTER — OFFICE VISIT (OUTPATIENT)
Dept: BREAST CENTER | Age: 52
End: 2022-12-09
Payer: COMMERCIAL

## 2022-12-09 VITALS
HEART RATE: 64 BPM | DIASTOLIC BLOOD PRESSURE: 76 MMHG | RESPIRATION RATE: 12 BRPM | TEMPERATURE: 97.8 F | WEIGHT: 149 LBS | SYSTOLIC BLOOD PRESSURE: 124 MMHG | BODY MASS INDEX: 30.04 KG/M2 | OXYGEN SATURATION: 96 % | HEIGHT: 59 IN

## 2022-12-09 DIAGNOSIS — Z85.3 PERSONAL HISTORY OF BREAST CANCER: Primary | ICD-10-CM

## 2022-12-09 DIAGNOSIS — C50.111 MALIGNANT NEOPLASM OF CENTRAL PORTION OF RIGHT BREAST IN FEMALE, ESTROGEN RECEPTOR NEGATIVE (HCC): ICD-10-CM

## 2022-12-09 DIAGNOSIS — Z17.1 MALIGNANT NEOPLASM OF CENTRAL PORTION OF RIGHT BREAST IN FEMALE, ESTROGEN RECEPTOR NEGATIVE (HCC): ICD-10-CM

## 2022-12-09 PROCEDURE — 99213 OFFICE O/P EST LOW 20 MIN: CPT | Performed by: SURGERY

## 2022-12-09 ASSESSMENT — ENCOUNTER SYMPTOMS
COUGH: 0
ABDOMINAL DISTENTION: 0
NAUSEA: 0
DIARRHEA: 0
BACK PAIN: 0
ANAL BLEEDING: 0
ALLERGIC/IMMUNOLOGIC NEGATIVE: 1
EYES NEGATIVE: 1
ABDOMINAL PAIN: 0
GASTROINTESTINAL NEGATIVE: 1
VOMITING: 0
BLOOD IN STOOL: 0
CONSTIPATION: 0
RESPIRATORY NEGATIVE: 1
SHORTNESS OF BREATH: 0

## 2022-12-09 NOTE — PROGRESS NOTES
[facility]  PROGRESS NOTE  ATTENDING NOTE        Chief Complaint   Patient presents with    Wound Check     Wound check. Patient states doing good completed HBO in July did 6 weeks of treatment. Patient denies current breast issues. S:  55y/o F with recurrent metastatic right breast cancer. She completed HBO in July. She is feeling well. She tried neratinib, but she had major side effects sending her to the hospital for significant weight loss and inability to tolerate food. She is currently not receiving treatment. She started a new job recently. She wants to have her port removed. Review of Systems   Constitutional:  Negative for activity change, appetite change and unexpected weight change. HENT: Negative. Eyes: Negative. Respiratory: Negative. Negative for cough and shortness of breath. Cardiovascular: Negative. Negative for chest pain and leg swelling. Gastrointestinal: Negative. Negative for abdominal distention, abdominal pain, anal bleeding, blood in stool, constipation, diarrhea, nausea and vomiting. Endocrine: Negative. Genitourinary: Negative. Musculoskeletal: Negative. Negative for arthralgias, back pain, gait problem, joint swelling and myalgias. Skin: Negative. Allergic/Immunologic: Negative. Neurological: Negative. Negative for dizziness, weakness and headaches. Hematological: Negative. Psychiatric/Behavioral:  Positive for sleep disturbance. Negative for confusion and decreased concentration. /76 (Site: Left Upper Arm, Position: Sitting, Cuff Size: Medium Adult)   Pulse 64   Temp 97.8 °F (36.6 °C) (Temporal)   Resp 12   Ht 4' 11\" (1.499 m)   Wt 149 lb (67.6 kg)   SpO2 96%   BMI 30.09 kg/m²   Physical Exam  Constitutional:       Appearance: Normal appearance. HENT:      Head: Normocephalic and atraumatic.       Nose: Nose normal.      Mouth/Throat:      Mouth: Mucous membranes are moist.      Pharynx: Oropharynx is clear. Eyes:      Extraocular Movements: Extraocular movements intact. Pupils: Pupils are equal, round, and reactive to light. Cardiovascular:      Rate and Rhythm: Normal rate and regular rhythm. Pulses: Normal pulses. Heart sounds: Normal heart sounds. Pulmonary:      Effort: Pulmonary effort is normal.      Breath sounds: Normal breath sounds. Chest:   Breasts:     Right: Absent. Left: Absent. Abdominal:      General: There is no distension. Palpations: Abdomen is soft. Tenderness: There is no abdominal tenderness. Musculoskeletal:         General: No tenderness or signs of injury. Cervical back: Normal range of motion and neck supple. Lymphadenopathy:      Cervical: No cervical adenopathy. Right cervical: No superficial cervical adenopathy. Left cervical: No superficial cervical adenopathy. Upper Body:      Right upper body: No supraclavicular or axillary adenopathy. Left upper body: No supraclavicular or axillary adenopathy. Skin:     General: Skin is warm and dry. Neurological:      General: No focal deficit present. Mental Status: She is alert and oriented to person, place, and time. Psychiatric:         Mood and Affect: Mood normal.         Behavior: Behavior normal.         Thought Content:  Thought content normal.         Judgment: Judgment normal.     ASSESSMENT/PLAN:  Recurrent right breast cancer--Stage IIIA, ER/KS-, HER2+; s/p right MRM and left mastectomy  --wound dehiscence--s/p completion HBO  --continue monthly examination and bring any changes to our attention  --call when ready to have mediport removed--can remove in office    Chelsey Emerson MD, MSc, FACS  12/9/2022  12:22 PM

## 2022-12-09 NOTE — PATIENT INSTRUCTIONS
Call when able to have port removed. Any questions or concerns, please contact the office at [phone].

## 2023-02-08 ENCOUNTER — TELEPHONE (OUTPATIENT)
Dept: SURGERY | Age: 53
End: 2023-02-08

## 2023-02-08 NOTE — TELEPHONE ENCOUNTER
Pt has been scheduled for 2/16/23 @ 1 pm in the [facility] clinic for In office Mediport removal with Dr. Marylen Oz, pt accepted date and time and verbalized understanding.   Electronically signed by Bandar Vinson on 2/8/2023 at 4:05 PM

## 2023-02-08 NOTE — TELEPHONE ENCOUNTER
----- Message from Rosibel Beckham MD sent at 2/8/2023  3:50 PM EST -----  Regarding: RE: mediport removal  Can I do it Thursday afternoon 2/16? Any time is fine, just put on my calendar  ----- Message -----  From: Kevin Reed  Sent: 2/8/2023   3:44 PM EST  To: Rosibel Beckham MD  Subject: mediport removal                                 Pt is requesting to have mediport removed in office, pt is going on vacation March 16-27 and would like to have it done prior to that, would you have time on 2/13? Or is there another day prior that we could have her come over as an add on?  Or would after her vacation be the best.    Electronically signed by Kevin Reed on 2/8/2023 at 3:44 PM

## 2023-02-16 ENCOUNTER — OFFICE VISIT (OUTPATIENT)
Dept: SURGERY | Age: 53
End: 2023-02-16
Payer: COMMERCIAL

## 2023-02-16 VITALS
SYSTOLIC BLOOD PRESSURE: 127 MMHG | RESPIRATION RATE: 16 BRPM | BODY MASS INDEX: 30.04 KG/M2 | HEART RATE: 93 BPM | DIASTOLIC BLOOD PRESSURE: 90 MMHG | OXYGEN SATURATION: 99 % | HEIGHT: 59 IN | TEMPERATURE: 97.2 F | WEIGHT: 149 LBS

## 2023-02-16 DIAGNOSIS — Z17.1 MALIGNANT NEOPLASM OF CENTRAL PORTION OF RIGHT BREAST IN FEMALE, ESTROGEN RECEPTOR NEGATIVE (HCC): ICD-10-CM

## 2023-02-16 DIAGNOSIS — C50.111 MALIGNANT NEOPLASM OF CENTRAL PORTION OF RIGHT BREAST IN FEMALE, ESTROGEN RECEPTOR NEGATIVE (HCC): ICD-10-CM

## 2023-02-16 DIAGNOSIS — C50.911 RECURRENT BREAST CANCER, RIGHT (HCC): Primary | ICD-10-CM

## 2023-02-16 PROCEDURE — 36590 REMOVAL TUNNELED CV CATH: CPT | Performed by: SURGERY

## 2023-02-16 PROCEDURE — 99212 OFFICE O/P EST SF 10 MIN: CPT | Performed by: SURGERY

## 2023-02-16 NOTE — PROGRESS NOTES
[facility]  PROGRESS NOTE  ATTENDING NOTE    Chief Complaint   Patient presents with    Procedure     Pt is here for Mediport removal in office, no other issues at this time. BP (!) 127/90 (Site: Left Upper Arm, Position: Sitting, Cuff Size: Large Adult)   Pulse 93   Temp 97.2 °F (36.2 °C) (Infrared)   Resp 16   Ht 4' 11\" (1.499 m)   Wt 149 lb (67.6 kg)   SpO2 99%   BMI 30.09 kg/m²       PROCEDURE NOTE    DESCRIPTION OF PROCEDURE: The patient was identified and the procedure was confirmed. Consent obtained. The left neck and chest were prepped and draped in the usual sterile fashion. Next, 1% lidocaine w/ epinephrine was used for local anesthesia. A skin incision was made through the previous incision scar and continue through the subcutaneous tissues. The reservoir and catheter were freed from the surrounding tissues and removed intact. Hemostasis was achieved, the wound was irrigated and the incision was closed with 4-0 vicryl and 4-0 monocryl sutures. Dermabond was applied to the incision. Needle, sponge, and instrument counts were reported as correct x2. The patient tolerated the procedure and was discharged.       Ok to shower tomorrow  No hot tube, bath tub, swimming x 2 weeks  Ok to apply ice  Take an ibuprofen or tylenol when you get home and continue as needed for pain  Monitor for signs of infection--fever, chills, warmth, redness    RTC PRN    Andrew Metcalf MD, MSc, FACS  2/16/2023  1:45 PM

## 2023-02-17 RX ORDER — LIDOCAINE HYDROCHLORIDE AND EPINEPHRINE 10; 10 MG/ML; UG/ML
20 INJECTION, SOLUTION INFILTRATION; PERINEURAL ONCE
Status: COMPLETED | OUTPATIENT
Start: 2023-02-17 | End: 2023-02-17

## 2023-02-17 RX ADMIN — LIDOCAINE HYDROCHLORIDE AND EPINEPHRINE 20 ML: 10; 10 INJECTION, SOLUTION INFILTRATION; PERINEURAL at 16:21

## 2024-02-05 ENCOUNTER — HOSPITAL ENCOUNTER (OUTPATIENT)
Dept: GENERAL RADIOLOGY | Age: 54
Discharge: HOME | End: 2024-02-07
Payer: COMMERCIAL

## 2024-02-05 DIAGNOSIS — M81.0 POSTMENOPAUSAL OSTEOPOROSIS: ICD-10-CM

## 2024-02-05 PROCEDURE — 77080 DXA BONE DENSITY AXIAL: CPT

## 2025-07-24 ENCOUNTER — HOSPITAL ENCOUNTER (OUTPATIENT)
Age: 55
Discharge: HOME | End: 2025-07-26

## 2025-07-29 LAB — SURGICAL PATHOLOGY REPORT: NORMAL

## (undated) DEVICE — DRAIN SURG 15FR L3 16IN DIA47MM SIL RND HUBLESS FULL FLUT

## (undated) DEVICE — SET SURG INSTR MINI VASC

## (undated) DEVICE — ADHESIVE SKIN CLOSURE TOP 36 CC HI VISC DERMBND MINI

## (undated) DEVICE — DRAPE,REIN 53X77,STERILE: Brand: MEDLINE

## (undated) DEVICE — GLOVE ORANGE PI 7   MSG9070

## (undated) DEVICE — SOLUTION IV 100ML 0.9% SOD CHL PLAS CONT USP VIAFLX 1 PER

## (undated) DEVICE — 18 GA N.G. KIT, 10 PACK: Brand: SITE-RITE

## (undated) DEVICE — INTENDED FOR TISSUE SEPARATION, AND OTHER PROCEDURES THAT REQUIRE A SHARP SURGICAL BLADE TO PUNCTURE OR CUT.: Brand: BARD-PARKER ® STAINLESS STEEL BLADES

## (undated) DEVICE — YANKAUER,OPEN TIP,W/O VENT,STERILE: Brand: MEDLINE INDUSTRIES, INC.

## (undated) DEVICE — APPLICATOR MEDICATED 26 CC SOLUTION HI LT ORNG CHLORAPREP

## (undated) DEVICE — PATIENT RETURN ELECTRODE, SINGLE-USE, CONTACT QUALITY MONITORING, ADULT, WITH 9FT CORD, FOR PATIENTS WEIGING OVER 33LBS. (15KG): Brand: MEGADYNE

## (undated) DEVICE — SYRINGE MED 10ML TRNSLUC BRL PLUNG BLK MRK POLYPR CTRL

## (undated) DEVICE — TOWEL,OR,DSP,ST,BLUE,STD,6/PK,12PK/CS: Brand: MEDLINE

## (undated) DEVICE — MARKER SURG MARGIN STD 6 CLR INK ASST CORR CLP

## (undated) DEVICE — GLOVE SURG SZ 65 THK91MIL LTX FREE SYN POLYISOPRENE

## (undated) DEVICE — Device

## (undated) DEVICE — AGENT HEMSTAT 5GM ARISTA AH

## (undated) DEVICE — SHEARS ENDOSCP L9CM CRV HARM FOCS +

## (undated) DEVICE — ELECTRODE PT RET AD L9FT HI MOIST COND ADH HYDRGEL CORDED

## (undated) DEVICE — ADHESIVE SKIN CLSR 0.7ML TOP DERMBND ADV

## (undated) DEVICE — SET INST MINOR PROCEDURE

## (undated) DEVICE — SUTURE BOOTIES, YELLOW, STERILE, 5 PAIR/PAD; 5 PADS/BOX: Brand: KEY SURGICAL SUTURE BOOTIES

## (undated) DEVICE — PROBE GAM TRUNODE DISP EACH

## (undated) DEVICE — STANDARD HYPODERMIC NEEDLE,ALUMINUM HUB: Brand: MONOJECT

## (undated) DEVICE — PLASMABLADE PS210-030S 3.0S LOCK: Brand: PLASMABLADE™

## (undated) DEVICE — TUBING, SUCTION, 3/16" X 12', STRAIGHT: Brand: MEDLINE

## (undated) DEVICE — BNDG,ELSTC,MATRIX,STRL,6"X5YD,LF,HOOK&LP: Brand: MEDLINE

## (undated) DEVICE — BANDAGE,GAUZE,4.5"X4.1YD,STERILE,LF: Brand: MEDLINE

## (undated) DEVICE — PLASMABLADE PS300-001 TONSIL: Brand: PLASMABLADE™

## (undated) DEVICE — C-ARM: Brand: UNBRANDED

## (undated) DEVICE — SPONGE LAP W18XL18IN WHT COT 4 PLY FLD STRUNG RADPQ DISP ST

## (undated) DEVICE — DECANTER BAG 9": Brand: MEDLINE INDUSTRIES, INC.

## (undated) DEVICE — LABEL MED 4 IN SURG PANEL W/ PEN STRL

## (undated) DEVICE — 3M™ STERI-STRIP™ REINFORCED ADHESIVE SKIN CLOSURES, R1547, 1/2 IN X 4 IN (12 MM X 100 MM), 6 STRIPS/ENVELOPE: Brand: 3M™ STERI-STRIP™

## (undated) DEVICE — PACK,UNIVERSAL,NO GOWNS: Brand: MEDLINE

## (undated) DEVICE — BOWL MED L 32OZ PLAS W/ MOLD GRAD EZ OPN PEEL PCH

## (undated) DEVICE — APPLIER LIG CLP M L11IN TI STR RNG HNDL FOR 20 CLP DISP

## (undated) DEVICE — DRAPE THER FLUID WARMING 66X44 IN FLAT SLUSH DBL DISC ORS

## (undated) DEVICE — COVER,LIGHT HANDLE,FLX,2/PK: Brand: MEDLINE INDUSTRIES, INC.

## (undated) DEVICE — UNIVERSAL DRAPE: Brand: MEDLINE INDUSTRIES, INC.

## (undated) DEVICE — SURGICAL PROCEDURE PACK BASIC

## (undated) DEVICE — GOWN,SIRUS,FABRNF,XL,20/CS: Brand: MEDLINE